# Patient Record
Sex: FEMALE | Race: WHITE | NOT HISPANIC OR LATINO | Employment: OTHER | URBAN - METROPOLITAN AREA
[De-identification: names, ages, dates, MRNs, and addresses within clinical notes are randomized per-mention and may not be internally consistent; named-entity substitution may affect disease eponyms.]

---

## 2020-01-22 NOTE — PROGRESS NOTES
ASSESSMENT & PLAN: Chris Hills is a 37 y o    with normal gynecologic exam     1   Routine well woman exam done today  2  Pap and HPV:  The patient's last pap and hpv was 2018 per pt  It was normal     Pap and cotesting was done today  Will call with results  Current AS[facility]P Guidelines reviewed  3   The following were reviewed in today's visit: breast self exam, STD testing, adequate intake of calcium and vitamin D, exercise and healthy diet  4  GC/Chlamydia, RPR, HIV, Hep B ordered for STD screening, will call with results  5  Amenorrhea in the setting of history of PCOS- TSH, Prolactin, and FSH ordered, will call with results  6  Encouraged to bring OTC supplements to next visit  7  F/U in 2 weeks to review lab results on a Tues morning when  Dr Weiss Serum here  BMI Counseling: Body mass index is 41 47 kg/m²  The BMI is above normal  Nutrition recommendations include reducing portion sizes, consuming healthier snacks and moderation in carbohydrate intake  CC:  Annual Gynecologic Examination    HPI: Chris Hills is a 37 y o  New patient to    who presents for annual gynecologic examination  She has the following concerns:  Amenorrhea  Pt reports LMP in 2018- exact date unknown  Pt had second child in  10/2018 and breast-fed for a year which ended in 10/2019  Pt attributed amenorrhea during that year due to breastfeeding  Currently, she is concerned because she has not had her menses since she stopped breastfeeding  Prior to getting pregnant in 2018 pt reports taking 2  OTC supplements (FertilAid and Ova Boost) that stimulated her menses on a regular basis  While on the medication her cycle was every 28 days and lasted 5 days a week  Moderate flow for the first 2 days and used about 3 pads a day and light flow for the remaining 3 days  Pt reports without these medications pt would only get her menses twice a year   She attributes her last successful pregnancy to these medications  Pt reports she has hx of PCOS    Health Maintenance:    She wears her seatbelt routinely  She does not perform regular monthly self breast exams  She feels safe at home  No past medical history on file  No past surgical history on file  Past OB/Gyn History:  OB History    None       Pt has menstrual issues  Amenorrhea as noted in HPI   History of sexually transmitted infection: Yes  + GC in 2015 per pt  History of abnormal pap smears: No     Patient is currently sexually active  heterosexual and  monogamous 17 years  The current method of family planning is none  No family history on file      Social History:  Social History     Socioeconomic History    Marital status: /Civil Union     Spouse name: Not on file    Number of children: Not on file    Years of education: Not on file    Highest education level: Not on file   Occupational History    Not on file   Social Needs    Financial resource strain: Not on file    Food insecurity:     Worry: Not on file     Inability: Not on file    Transportation needs:     Medical: Not on file     Non-medical: Not on file   Tobacco Use    Smoking status: Not on file   Substance and Sexual Activity    Alcohol use: Not on file    Drug use: Not on file    Sexual activity: Not on file   Lifestyle    Physical activity:     Days per week: Not on file     Minutes per session: Not on file    Stress: Not on file   Relationships    Social connections:     Talks on phone: Not on file     Gets together: Not on file     Attends Mosque service: Not on file     Active member of club or organization: Not on file     Attends meetings of clubs or organizations: Not on file     Relationship status: Not on file    Intimate partner violence:     Fear of current or ex partner: Not on file     Emotionally abused: Not on file     Physically abused: Not on file     Forced sexual activity: Not on file   Other Topics Concern    Not on file Social History Narrative    Not on file     Presently lives with  and two sons  Patient is   Patient is currently employed, self employed    Allergies not on file    No current outpatient medications on file  Review of Systems  Constitutional :no fever, feels well, no tiredness, no recent weight gain or loss  ENT: no ear ache, no loss of hearing, no nosebleeds or nasal discharge, no sore throat or hoarseness  Cardiovascular: no complaints of slow or fast heart beat, no chest pain, no palpitations, no leg claudication or lower extremity edema  Respiratory: no complaints of shortness of shortness of breath, no MERINO  Breasts:no complaints of breast pain, breast lump, or nipple discharge  Gastrointestinal: no complaints of abdominal pain, constipation, nausea, vomiting, or diarrhea or bloody stools  Genitourinary : no complaints of dysuria, incontinence, pelvic pain, vaginal discharge or abnormal vaginal bleeding  + Amenorrhea as noted in HPI  Musculoskeletal: no complaints of arthralgia, no myalgia, no joint swelling or stiffness, no limb pain or swelling  Integumentary: no complaints of skin rash or lesion, itching or dry skin  Neurological: no complaints of headache, no confusion, no numbness or tingling, no dizziness or fainting    Objective      There were no vitals taken for this visit    General:   appears stated age, cooperative, alert normal mood and affect   Neck: normal, supple,trachea midline, no masses   Heart: regular rate and rhythm, S1, S2 normal, no murmur, click, rub or gallop   Lungs: clear to auscultation bilaterally   Breasts: normal appearance, no masses or tenderness, No nipple retraction or dimpling, No nipple discharge or bleeding, No axillary or supraclavicular adenopathy, Taught monthly breast self examination   Abdomen: soft, non-tender, without masses or organomegaly   Vulva: Bartholin's, Urethra, Bergoo normal   Vagina: normal vagina, no discharge, exudate, lesion, or erythema   Urethra: normal   Cervix: PAP done  Friable  GCC done  Uterus: anteverted and small   Adnexa: no mass, fullness, tenderness   Lymphatic palpation of lymph nodes in neck, axilla, groin and/or other locations: no lymphadenopathy or masses noted   Skin normal skin turgor and no rashes     Psychiatric orientation to person, place, and time: normal  mood and affect: normal     Seen by Romana Regulus, SNP  And Haily CONCEPCION

## 2020-01-23 ENCOUNTER — OFFICE VISIT (OUTPATIENT)
Dept: OBGYN CLINIC | Facility: CLINIC | Age: 35
End: 2020-01-23

## 2020-01-23 VITALS
WEIGHT: 255 LBS | DIASTOLIC BLOOD PRESSURE: 75 MMHG | SYSTOLIC BLOOD PRESSURE: 107 MMHG | HEART RATE: 81 BPM | BODY MASS INDEX: 40.98 KG/M2 | HEIGHT: 66 IN

## 2020-01-23 DIAGNOSIS — N91.2 AMENORRHEA: ICD-10-CM

## 2020-01-23 DIAGNOSIS — Z11.3 ROUTINE SCREENING FOR STI (SEXUALLY TRANSMITTED INFECTION): ICD-10-CM

## 2020-01-23 DIAGNOSIS — Z87.42 HISTORY OF PCOS: ICD-10-CM

## 2020-01-23 DIAGNOSIS — Z23 NEED FOR INFLUENZA VACCINATION: ICD-10-CM

## 2020-01-23 DIAGNOSIS — Z01.419 ENCOUNTER FOR GYNECOLOGICAL EXAMINATION WITHOUT ABNORMAL FINDING: Primary | ICD-10-CM

## 2020-01-23 PROCEDURE — 88175 CYTOPATH C/V AUTO FLUID REDO: CPT | Performed by: NURSE PRACTITIONER

## 2020-01-23 PROCEDURE — 87624 HPV HI-RISK TYP POOLED RSLT: CPT | Performed by: NURSE PRACTITIONER

## 2020-01-23 PROCEDURE — 90686 IIV4 VACC NO PRSV 0.5 ML IM: CPT | Performed by: NURSE PRACTITIONER

## 2020-01-23 PROCEDURE — 87491 CHLMYD TRACH DNA AMP PROBE: CPT | Performed by: NURSE PRACTITIONER

## 2020-01-23 PROCEDURE — 99385 PREV VISIT NEW AGE 18-39: CPT | Performed by: NURSE PRACTITIONER

## 2020-01-23 PROCEDURE — 87591 N.GONORRHOEAE DNA AMP PROB: CPT | Performed by: NURSE PRACTITIONER

## 2020-01-23 PROCEDURE — 3725F SCREEN DEPRESSION PERFORMED: CPT | Performed by: NURSE PRACTITIONER

## 2020-01-23 PROCEDURE — 90471 IMMUNIZATION ADMIN: CPT | Performed by: NURSE PRACTITIONER

## 2020-01-23 NOTE — PATIENT INSTRUCTIONS
Polycystic Ovarian Syndrome   WHAT YOU NEED TO KNOW:   Polycystic ovarian syndrome (PCOS) is a hormone disorder that causes cysts to form on your ovaries  Cysts are bumps that are filled with fluid  The cysts can prevent your ovaries from working correctly  DISCHARGE INSTRUCTIONS:   Medicines:   · Birth control pills: These medicines have female hormones, and may decrease male hormone levels  Birth control pills may control your periods, prevent cysts, or cause them to shrink  They also help decrease your risk of endometrial cancer and correct abnormal bleeding  · Hypoglycemic medicines: These help to lower your blood sugar levels and decrease insulin resistance  They are also used to lower male hormone levels and help you ovulate  · NSAIDs:  These medicines decrease swelling and pain  You can buy NSAIDs without a doctor's order  Ask your healthcare provider which medicine is right for you, and how much to take  Take as directed  NSAIDs can cause stomach bleeding or kidney problems if not taken correctly  · Take your medicine as directed  Contact your healthcare provider if you think your medicine is not helping or if you have side effects  Tell him of her if you are allergic to any medicine  Keep a list of the medicines, vitamins, and herbs you take  Include the amounts, and when and why you take them  Bring the list or the pill bottles to follow-up visits  Carry your medicine list with you in case of an emergency  Follow up with your healthcare provider or gynecologist as directed: You may need to return to have more tests  Write down your questions so you remember to ask them during your visits  Manage your blood sugar and blood pressure: Your healthcare provider may want you to check your blood sugar levels and blood pressure at home  Keep a record and bring this to your follow-up visits  Blood sugar is measured with a glucose monitor  The monitor tests a small drop of blood   Blood pressure is measured with a cuff that you put on your arm and tighten  Ask for more information on how to measure your blood sugar and blood pressure  Manage your symptoms:   · Maintain a healthy weight:  Ask your healthcare provider how much you should weigh  Ask him to help you create a weight loss plan if you are overweight  Weight loss may help reduce the complications of PCOS  · Exercise:  Ask your healthcare provider about the best exercise plan for you  Exercise can help decrease blood sugar and blood pressure  It may also help with weight loss  · Eat a variety of healthy foods:  Healthy foods include fruits, vegetables, whole-grain breads, low-fat dairy products, beans, lean meats, and fish  A dietitian may help you plan meals that are lower in carbohydrates to help you manage your blood sugar levels  Too much carbohydrate at one time can raise your blood sugar to a high level  Contact your healthcare provider or gynecologist if:   · You have a fever  · You feel weak or tired  · You have pain during sex  · Your pain is worse or does not go away after you take your pain medicine  · You have trouble urinating or emptying your bladder completely  · You have questions or concerns about your condition or care  Return to the emergency department if:   · You have a severe headache or feel dizzy  · You vomit multiple times and cannot keep food or liquids down  · You have blurred or double vision  · Your breath has a fruity sweet smell, or you feel short of breath  · You have severe lower abdominal or pelvic pain  © 2017 [employer] Information is for End User's use only and may not be sold, redistributed or otherwise used for commercial purposes  All illustrations and images included in CareNotes® are the copyrighted property of [employer]  or [employer]  The above information is an  only   It is not intended as medical advice for individual conditions or treatments  Talk to your doctor, nurse or pharmacist before following any medical regimen to see if it is safe and effective for you

## 2020-01-24 LAB
C TRACH DNA SPEC QL NAA+PROBE: NEGATIVE
N GONORRHOEA DNA SPEC QL NAA+PROBE: NEGATIVE

## 2020-01-27 ENCOUNTER — TELEPHONE (OUTPATIENT)
Dept: OBGYN CLINIC | Facility: CLINIC | Age: 35
End: 2020-01-27

## 2020-01-27 LAB
HPV HR 12 DNA CVX QL NAA+PROBE: NEGATIVE
HPV16 DNA CVX QL NAA+PROBE: NEGATIVE
HPV18 DNA CVX QL NAA+PROBE: NEGATIVE

## 2020-01-27 NOTE — TELEPHONE ENCOUNTER
----- Message from David Ponce sent at 1/27/2020  8:10 AM EST -----  Result is negative, please call patient to inform

## 2020-01-28 LAB
LAB AP GYN PRIMARY INTERPRETATION: NORMAL
Lab: NORMAL

## 2020-02-03 ENCOUNTER — TELEPHONE (OUTPATIENT)
Dept: OBGYN CLINIC | Facility: CLINIC | Age: 35
End: 2020-02-03

## 2020-02-03 NOTE — TELEPHONE ENCOUNTER
Called to re-schedule patients appointment that is scheduled for tomorrow 2/4/20  Blood work was not completed/or not on file  Left message to call office

## 2020-02-04 ENCOUNTER — TRANSCRIBE ORDERS (OUTPATIENT)
Dept: LAB | Facility: CLINIC | Age: 35
End: 2020-02-04

## 2020-02-04 ENCOUNTER — APPOINTMENT (OUTPATIENT)
Dept: LAB | Facility: CLINIC | Age: 35
End: 2020-02-04
Payer: COMMERCIAL

## 2020-02-04 DIAGNOSIS — N91.2 AMENORRHEA: ICD-10-CM

## 2020-02-04 LAB
FSH SERPL-ACNC: 6.3 MIU/ML
HBV SURFACE AG SER QL: NORMAL
PROLACTIN SERPL-MCNC: 3.9 NG/ML
TSH SERPL DL<=0.05 MIU/L-ACNC: 2 UIU/ML (ref 0.36–3.74)

## 2020-02-04 PROCEDURE — 86592 SYPHILIS TEST NON-TREP QUAL: CPT | Performed by: NURSE PRACTITIONER

## 2020-02-04 PROCEDURE — 87389 HIV-1 AG W/HIV-1&-2 AB AG IA: CPT | Performed by: NURSE PRACTITIONER

## 2020-02-04 PROCEDURE — 36415 COLL VENOUS BLD VENIPUNCTURE: CPT | Performed by: NURSE PRACTITIONER

## 2020-02-04 PROCEDURE — 84146 ASSAY OF PROLACTIN: CPT

## 2020-02-04 PROCEDURE — 84443 ASSAY THYROID STIM HORMONE: CPT

## 2020-02-04 PROCEDURE — 83001 ASSAY OF GONADOTROPIN (FSH): CPT

## 2020-02-04 PROCEDURE — 87340 HEPATITIS B SURFACE AG IA: CPT | Performed by: NURSE PRACTITIONER

## 2020-02-05 LAB
HIV 1+2 AB+HIV1 P24 AG SERPL QL IA: NORMAL
RPR SER QL: NORMAL

## 2020-02-11 ENCOUNTER — OFFICE VISIT (OUTPATIENT)
Dept: OBGYN CLINIC | Facility: CLINIC | Age: 35
End: 2020-02-11

## 2020-02-11 VITALS
WEIGHT: 239 LBS | SYSTOLIC BLOOD PRESSURE: 105 MMHG | BODY MASS INDEX: 38.87 KG/M2 | DIASTOLIC BLOOD PRESSURE: 73 MMHG | HEART RATE: 73 BPM

## 2020-02-11 DIAGNOSIS — E28.2 PCOS (POLYCYSTIC OVARIAN SYNDROME): Primary | ICD-10-CM

## 2020-02-11 PROCEDURE — 99214 OFFICE O/P EST MOD 30 MIN: CPT | Performed by: FAMILY MEDICINE

## 2020-02-11 PROCEDURE — T1015 CLINIC SERVICE: HCPCS | Performed by: FAMILY MEDICINE

## 2020-02-11 RX ORDER — NORGESTIMATE AND ETHINYL ESTRADIOL 0.25-0.035
1 KIT ORAL DAILY
Qty: 30 TABLET | Refills: 4 | Status: SHIPPED | OUTPATIENT
Start: 2020-02-11 | End: 2020-07-30 | Stop reason: ALTCHOICE

## 2020-02-17 ENCOUNTER — HOSPITAL ENCOUNTER (EMERGENCY)
Facility: HOSPITAL | Age: 35
Discharge: HOME | End: 2020-02-18
Attending: EMERGENCY MEDICINE | Admitting: EMERGENCY MEDICINE
Payer: COMMERCIAL

## 2020-02-17 VITALS
HEART RATE: 82 BPM | BODY MASS INDEX: 38.25 KG/M2 | OXYGEN SATURATION: 96 % | WEIGHT: 238 LBS | SYSTOLIC BLOOD PRESSURE: 113 MMHG | TEMPERATURE: 98.2 F | HEIGHT: 66 IN | DIASTOLIC BLOOD PRESSURE: 75 MMHG | RESPIRATION RATE: 18 BRPM

## 2020-02-17 DIAGNOSIS — J02.9 VIRAL PHARYNGITIS: Primary | ICD-10-CM

## 2020-02-17 PROCEDURE — 87651 STREP A DNA AMP PROBE: CPT | Performed by: EMERGENCY MEDICINE

## 2020-02-17 PROCEDURE — 99283 EMERGENCY DEPT VISIT LOW MDM: CPT

## 2020-02-17 PROCEDURE — 99283 EMERGENCY DEPT VISIT LOW MDM: CPT | Performed by: EMERGENCY MEDICINE

## 2020-02-18 LAB — S PYO DNA THROAT QL NAA+PROBE: NORMAL

## 2020-02-18 NOTE — ED PROVIDER NOTES
History  Chief Complaint   Patient presents with    Sore Throat     Pt reports a sore throat and ear pain that started yesturday  Pt sister had strep throat   Earache        used: No    Sore Throat   Location:  Generalized  Quality:  Aching  Severity:  Moderate  Onset quality:  Gradual  Duration:  2 days  Timing:  Intermittent  Progression:  Waxing and waning  Chronicity:  New  Relieved by:  None tried  Worsened by:  Nothing  Ineffective treatments:  None tried  Associated symptoms: ear pain    Associated symptoms: no abdominal pain, no adenopathy, no chest pain, no chills, no ear discharge, no epistaxis, no eye discharge, no fever, no neck stiffness, no rash, no rhinorrhea, no shortness of breath, no trouble swallowing and no voice change    Risk factors: exposure to strep        Prior to Admission Medications   Prescriptions Last Dose Informant Patient Reported? Taking?   norgestimate-ethinyl estradiol (ORTHO-CYCLEN) 0 25-35 MG-MCG per tablet   No No   Sig: Take 1 tablet by mouth daily      Facility-Administered Medications: None       History reviewed  No pertinent past medical history  Past Surgical History:   Procedure Laterality Date    VAGINAL DELIVERY      WISDOM TOOTH EXTRACTION         Family History   Problem Relation Age of Onset    No Known Problems Mother     Hypertension Father     Hyperlipidemia Father      I have reviewed and agree with the history as documented  Social History     Tobacco Use    Smoking status: Former Smoker    Smokeless tobacco: Never Used   Substance Use Topics    Alcohol use: Not Currently     Frequency: Monthly or less     Drinks per session: 1 or 2     Binge frequency: Never    Drug use: Never       Review of Systems   Constitutional: Negative for activity change, appetite change, chills, diaphoresis, fatigue and fever  HENT: Positive for ear pain and sore throat   Negative for congestion, dental problem, ear discharge, facial swelling, nosebleeds, rhinorrhea, sinus pressure, trouble swallowing and voice change  Eyes: Negative for photophobia, discharge, itching and visual disturbance  Respiratory: Negative for choking, chest tightness and shortness of breath  Cardiovascular: Negative for chest pain, palpitations and leg swelling  Gastrointestinal: Negative for abdominal distention, abdominal pain, constipation, diarrhea, nausea and vomiting  Endocrine: Negative for polydipsia and polyphagia  Genitourinary: Negative for decreased urine volume, difficulty urinating, dysuria, flank pain, frequency, hematuria, vaginal bleeding and vaginal discharge  Musculoskeletal: Negative for back pain, gait problem, joint swelling, neck pain and neck stiffness  Skin: Negative for color change and rash  Neurological: Negative for dizziness, facial asymmetry, speech difficulty, weakness and light-headedness  Hematological: Negative for adenopathy  Psychiatric/Behavioral: Negative for agitation and behavioral problems  The patient is not nervous/anxious and is not hyperactive  All other systems reviewed and are negative  Physical Exam  Physical Exam   Constitutional: She is oriented to person, place, and time  She appears well-developed and well-nourished  No distress  HENT:   Head: Normocephalic and atraumatic  Right Ear: Tympanic membrane normal    Left Ear: Tympanic membrane normal    Eyes: Pupils are equal, round, and reactive to light  EOM are normal    Neck: Normal range of motion  Neck supple  Cardiovascular: Normal rate, regular rhythm and normal heart sounds  No murmur heard  Pulmonary/Chest: Effort normal and breath sounds normal  No respiratory distress  She has no wheezes  She has no rales  Abdominal: Soft  Bowel sounds are normal  She exhibits no distension  There is no tenderness  There is no rebound and no guarding  Musculoskeletal: Normal range of motion  She exhibits no edema or deformity  Lymphadenopathy:     She has no cervical adenopathy  Neurological: She is alert and oriented to person, place, and time  No cranial nerve deficit  She exhibits normal muscle tone  Coordination normal    Skin: Capillary refill takes less than 2 seconds  No rash noted  No erythema  Psychiatric: She has a normal mood and affect  Her behavior is normal    Nursing note and vitals reviewed  Vital Signs  ED Triage Vitals [02/17/20 2236]   Temperature Pulse Respirations Blood Pressure SpO2   98 2 °F (36 8 °C) 82 18 113/75 96 %      Temp Source Heart Rate Source Patient Position - Orthostatic VS BP Location FiO2 (%)   Oral Monitor Sitting Left arm --      Pain Score       6           Vitals:    02/17/20 2236   BP: 113/75   Pulse: 82   Patient Position - Orthostatic VS: Sitting         Visual Acuity      ED Medications  Medications - No data to display    Diagnostic Studies  Results Reviewed     Procedure Component Value Units Date/Time    Strep A PCR [[id number]]  (Normal) Collected:  02/17/20 2303    Lab Status:  Final result Specimen:  Throat Updated:  02/18/20 0010     STREP A PCR None Detected                 No orders to display              Procedures  Procedures         ED Course                               MDM  Number of Diagnoses or Management Options  Viral pharyngitis: new and requires workup  Diagnosis management comments: Sore throat for 2 days  Strep negative  No peritonsillar abscess  Overall appears well, tolerating secretions  No additional laboratory studies indicated  Recommended Tylenol/Motrin  Suspect viral pharyngitis  Stable at time of discharge         Amount and/or Complexity of Data Reviewed  Clinical lab tests: ordered and reviewed    Risk of Complications, Morbidity, and/or Mortality  Presenting problems: moderate  Diagnostic procedures: moderate  Management options: moderate    Patient Progress  Patient progress: stable        Disposition  Final diagnoses:   Viral pharyngitis Time reflects when diagnosis was documented in both MDM as applicable and the Disposition within this note     Time User Action Codes Description Comment    2/18/2020 12:11 AM Jocelynn Paradis Add [J02 9] Viral pharyngitis       ED Disposition     ED Disposition Condition Date/Time Comment    Discharge Stable Tue Feb 18, 2020 12:11 AM Berta Gomez discharge to home/self care  Follow-up Information     Follow up With Specialties Details Why Contact Info    Kellen Manzanares MD Family Medicine Schedule an appointment as soon as possible for a visit in 3 days As needed [phone]  [phone] [address] [phone]            Patient's Medications   Discharge Prescriptions    No medications on file     No discharge procedures on file      PDMP Review     None          ED Provider  Electronically Signed by           Prince Jones MD  02/18/20 6326

## 2020-03-02 ENCOUNTER — HOSPITAL ENCOUNTER (EMERGENCY)
Facility: HOSPITAL | Age: 35
Discharge: HOME | End: 2020-03-02
Attending: EMERGENCY MEDICINE | Admitting: EMERGENCY MEDICINE
Payer: COMMERCIAL

## 2020-03-02 VITALS
BODY MASS INDEX: 38.32 KG/M2 | SYSTOLIC BLOOD PRESSURE: 117 MMHG | OXYGEN SATURATION: 100 % | TEMPERATURE: 97.3 F | DIASTOLIC BLOOD PRESSURE: 75 MMHG | RESPIRATION RATE: 18 BRPM | WEIGHT: 237.44 LBS | HEART RATE: 75 BPM

## 2020-03-02 DIAGNOSIS — J02.9 ACUTE PHARYNGITIS, UNSPECIFIED ETIOLOGY: Primary | ICD-10-CM

## 2020-03-02 PROCEDURE — 99282 EMERGENCY DEPT VISIT SF MDM: CPT

## 2020-03-02 PROCEDURE — 99284 EMERGENCY DEPT VISIT MOD MDM: CPT | Performed by: EMERGENCY MEDICINE

## 2020-03-02 RX ORDER — PENICILLIN V POTASSIUM 250 MG/1
500 TABLET ORAL ONCE
Status: COMPLETED | OUTPATIENT
Start: 2020-03-02 | End: 2020-03-02

## 2020-03-02 RX ORDER — PENICILLIN V POTASSIUM 500 MG/1
500 TABLET ORAL 2 TIMES DAILY
Qty: 14 TABLET | Refills: 0 | Status: SHIPPED | OUTPATIENT
Start: 2020-03-02 | End: 2020-03-09

## 2020-03-02 RX ORDER — IBUPROFEN 600 MG/1
600 TABLET ORAL ONCE
Status: COMPLETED | OUTPATIENT
Start: 2020-03-02 | End: 2020-03-02

## 2020-03-02 RX ADMIN — IBUPROFEN 600 MG: 600 TABLET ORAL at 01:20

## 2020-03-02 RX ADMIN — PENICILLIN V POTASSIUM 500 MG: 250 TABLET, FILM COATED ORAL at 01:20

## 2020-03-02 NOTE — ED PROVIDER NOTES
History  Chief Complaint   Patient presents with    Sore Throat     Patient comes to ED for throat pain and difficulty swallowing  Pt states it has been going on for 1 month  Went to PCP and was given 3 day course of abx and completed it last week  Started with symptoms again Saturday  Patient is a 64-year-old female with a history of PCOS who presents with a sore throat  Patient states that she started with a sore throat more than 1 week ago and was seen in the emergency department  She tested negative for strep and followed up with her PCP  Her PCP put her on 3 days of an oral antibiotic  She states that she had complete resolution of symptoms but that they returned 2 days ago  She describes a sore throat and significant pain with swallowing  She has been taking ibuprofen with some relief of symptoms  She denies fever, chills, difficulty breathing, cough or other concerns  History provided by:  Patient  Sore Throat   Location:  Generalized  Quality:  Sore  Duration:  2 days  Timing:  Constant  Progression:  Worsening  Chronicity:  New  Relieved by:  NSAIDs  Associated symptoms: ear pain and voice change    Associated symptoms: no abdominal pain, no adenopathy, no chest pain, no chills, no cough, no epistaxis, no fever, no headaches, no neck stiffness, no rash, no shortness of breath and no trouble swallowing        Prior to Admission Medications   Prescriptions Last Dose Informant Patient Reported? Taking?   norgestimate-ethinyl estradiol (ORTHO-CYCLEN) 0 25-35 MG-MCG per tablet Not Taking at Unknown time  No No   Sig: Take 1 tablet by mouth daily   Patient not taking: Reported on 3/2/2020      Facility-Administered Medications: None       History reviewed  No pertinent past medical history      Past Surgical History:   Procedure Laterality Date    VAGINAL DELIVERY      WISDOM TOOTH EXTRACTION         Family History   Problem Relation Age of Onset    No Known Problems Mother     Hypertension Father     Hyperlipidemia Father      I have reviewed and agree with the history as documented  E-Cigarette/Vaping    E-Cigarette Use Never User      E-Cigarette/Vaping Substances    Nicotine No     THC No     CBD No      Social History     Tobacco Use    Smoking status: Former Smoker    Smokeless tobacco: Never Used   Substance Use Topics    Alcohol use: Not Currently     Frequency: Monthly or less     Drinks per session: 1 or 2     Binge frequency: Never    Drug use: Never       Review of Systems   Constitutional: Negative for chills, diaphoresis and fever  HENT: Positive for ear pain, sore throat and voice change  Negative for nosebleeds and trouble swallowing  Eyes: Negative for photophobia, pain and visual disturbance  Respiratory: Negative for cough, chest tightness and shortness of breath  Cardiovascular: Negative for chest pain, palpitations and leg swelling  Gastrointestinal: Negative for abdominal pain, constipation, diarrhea, nausea and vomiting  Endocrine: Negative for polydipsia and polyuria  Genitourinary: Negative for difficulty urinating, dysuria, hematuria, pelvic pain, vaginal bleeding and vaginal discharge  Musculoskeletal: Negative for back pain, neck pain and neck stiffness  Skin: Negative for pallor and rash  Neurological: Negative for dizziness, seizures, light-headedness and headaches  Hematological: Negative for adenopathy  All other systems reviewed and are negative  Physical Exam  Physical Exam   Constitutional: She is oriented to person, place, and time  She appears well-developed and well-nourished  No distress  HENT:   Head: Normocephalic and atraumatic  Mouth/Throat: Mucous membranes are normal  Oropharyngeal exudate (  Left greater than right tonsillar exudate ) and posterior oropharyngeal erythema (generalized erythema) present  Eyes: Pupils are equal, round, and reactive to light  EOM are normal    Neck: Normal range of motion   Neck supple  Cardiovascular: Normal rate, regular rhythm, normal heart sounds, intact distal pulses and normal pulses  Pulmonary/Chest: Effort normal and breath sounds normal  No respiratory distress  Abdominal: Soft  She exhibits no distension  There is no tenderness  There is no rigidity, no rebound and no guarding  Musculoskeletal: Normal range of motion  She exhibits no edema or tenderness  Lymphadenopathy:     She has no cervical adenopathy  Neurological: She is alert and oriented to person, place, and time  She has normal strength  No cranial nerve deficit or sensory deficit  Skin: Skin is warm and dry  Capillary refill takes less than 2 seconds  Psychiatric: She has a normal mood and affect  Nursing note and vitals reviewed  Vital Signs  ED Triage Vitals [03/02/20 0039]   Temperature Pulse Respirations Blood Pressure SpO2   (!) 97 3 °F (36 3 °C) 75 18 117/75 100 %      Temp Source Heart Rate Source Patient Position - Orthostatic VS BP Location FiO2 (%)   Oral Monitor Sitting Right arm --      Pain Score       8           Vitals:    03/02/20 0039   BP: 117/75   Pulse: 75   Patient Position - Orthostatic VS: Sitting         Visual Acuity      ED Medications  Medications   ibuprofen (MOTRIN) tablet 600 mg (600 mg Oral Given 3/2/20 0120)   penicillin V potassium (VEETID) tablet 500 mg (500 mg Oral Given 3/2/20 0120)       Diagnostic Studies  Results Reviewed     None                 No orders to display              Procedures  Procedures         ED Course                               MDM  Number of Diagnoses or Management Options  Acute pharyngitis, unspecified etiology: new and requires workup  Diagnosis management comments: Patient presents with a sore throat  She initially tested negative for strep but followed up with her PCP and was placed on a 3 day course of antibiotics  She did experience relief but her symptoms returned shortly after finishing the antibiotics    Possible recurrence of viral pharyngitis  However it is also possible that she had a bacterial source of infection which was partially treated  Will place on a full course of oral penicillin  No evidence of peritonsillar abscess, retropharyngeal abscess, Willi's angina or other significant pathology  Patient is nontoxic appearing  She is stable for discharge in outpatient follow-up  Advised that she return to the ED if symptoms worsen or persist        Amount and/or Complexity of Data Reviewed  Review and summarize past medical records: yes    Risk of Complications, Morbidity, and/or Mortality  Presenting problems: moderate  Diagnostic procedures: minimal  Management options: moderate    Patient Progress  Patient progress: stable        Disposition  Final diagnoses:   Acute pharyngitis, unspecified etiology     Time reflects when diagnosis was documented in both MDM as applicable and the Disposition within this note     Time User Action Codes Description Comment    3/2/2020 12:59 AM Patricia Broad L Add [J02 0] Strep pharyngitis     3/2/2020 12:59 AM Patricia Broad L Remove [J02 0] Strep pharyngitis     3/2/2020 12:59 AM Patricia Broad L Add [J02 9] Acute pharyngitis, unspecified etiology       ED Disposition     ED Disposition Condition Date/Time Comment    Discharge Stable Mon Mar 2, 2020 12:59 AM Pratima Lat discharge to home/self care              Follow-up Information     Follow up With Specialties Details Why Contact Info    Tali Olson MD Family Medicine Schedule an appointment as soon as possible for a visit  Return to ED sooner if symptoms worsen or persist  [address]  [phone] [address]  [phone]            Discharge Medication List as of 3/2/2020  1:00 AM      START taking these medications    Details   penicillin V potassium (VEETID) 500 mg tablet Take 1 tablet (500 mg total) by mouth 2 (two) times a day for 7 days, Starting Mon 3/2/2020, Until Mon 3/9/2020, Normal CONTINUE these medications which have NOT CHANGED    Details   norgestimate-ethinyl estradiol (ORTHO-CYCLEN) 0 25-35 MG-MCG per tablet Take 1 tablet by mouth daily, Starting Tue 2/11/2020, Normal           No discharge procedures on file      PDMP Review     None          ED Provider  Electronically Signed by           Krishna Bernstein DO  03/02/20 1139

## 2020-06-01 ENCOUNTER — TELEPHONE (OUTPATIENT)
Dept: OBGYN CLINIC | Facility: CLINIC | Age: 35
End: 2020-06-01

## 2020-06-02 ENCOUNTER — OFFICE VISIT (OUTPATIENT)
Dept: OBGYN CLINIC | Facility: CLINIC | Age: 35
End: 2020-06-02

## 2020-06-02 VITALS
BODY MASS INDEX: 37.12 KG/M2 | HEART RATE: 88 BPM | DIASTOLIC BLOOD PRESSURE: 74 MMHG | WEIGHT: 230 LBS | SYSTOLIC BLOOD PRESSURE: 126 MMHG

## 2020-06-02 DIAGNOSIS — N91.2 AMENORRHEA: ICD-10-CM

## 2020-06-02 DIAGNOSIS — E28.2 PCOS (POLYCYSTIC OVARIAN SYNDROME): Primary | ICD-10-CM

## 2020-06-02 DIAGNOSIS — Z20.2 POSSIBLE EXPOSURE TO STD: ICD-10-CM

## 2020-06-02 PROCEDURE — 87491 CHLMYD TRACH DNA AMP PROBE: CPT | Performed by: NURSE PRACTITIONER

## 2020-06-02 PROCEDURE — 87591 N.GONORRHOEAE DNA AMP PROB: CPT | Performed by: NURSE PRACTITIONER

## 2020-06-02 PROCEDURE — 99213 OFFICE O/P EST LOW 20 MIN: CPT | Performed by: NURSE PRACTITIONER

## 2020-06-03 LAB
C TRACH DNA SPEC QL NAA+PROBE: NEGATIVE
N GONORRHOEA DNA SPEC QL NAA+PROBE: NEGATIVE

## 2020-06-04 ENCOUNTER — TELEPHONE (OUTPATIENT)
Dept: OBGYN CLINIC | Facility: CLINIC | Age: 35
End: 2020-06-04

## 2020-06-22 ENCOUNTER — TELEPHONE (OUTPATIENT)
Dept: OBGYN CLINIC | Facility: CLINIC | Age: 35
End: 2020-06-22

## 2020-07-07 ENCOUNTER — TELEPHONE (OUTPATIENT)
Dept: OBGYN CLINIC | Facility: CLINIC | Age: 35
End: 2020-07-07

## 2020-07-07 ENCOUNTER — APPOINTMENT (OUTPATIENT)
Dept: LAB | Facility: CLINIC | Age: 35
End: 2020-07-07
Payer: COMMERCIAL

## 2020-07-07 ENCOUNTER — OFFICE VISIT (OUTPATIENT)
Dept: OBGYN CLINIC | Facility: CLINIC | Age: 35
End: 2020-07-07

## 2020-07-07 VITALS
WEIGHT: 229 LBS | HEART RATE: 70 BPM | BODY MASS INDEX: 36.96 KG/M2 | SYSTOLIC BLOOD PRESSURE: 106 MMHG | DIASTOLIC BLOOD PRESSURE: 76 MMHG

## 2020-07-07 DIAGNOSIS — Z3A.01 LESS THAN 8 WEEKS GESTATION OF PREGNANCY: ICD-10-CM

## 2020-07-07 DIAGNOSIS — Z32.01 POSITIVE URINE PREGNANCY TEST: Primary | ICD-10-CM

## 2020-07-07 DIAGNOSIS — Z20.2 POSSIBLE EXPOSURE TO STD: ICD-10-CM

## 2020-07-07 LAB
B-HCG SERPL-ACNC: 684 MIU/ML
HBV SURFACE AG SER QL: NORMAL

## 2020-07-07 PROCEDURE — 99213 OFFICE O/P EST LOW 20 MIN: CPT | Performed by: OBSTETRICS & GYNECOLOGY

## 2020-07-07 PROCEDURE — 84702 CHORIONIC GONADOTROPIN TEST: CPT

## 2020-07-07 PROCEDURE — 86592 SYPHILIS TEST NON-TREP QUAL: CPT

## 2020-07-07 PROCEDURE — 87340 HEPATITIS B SURFACE AG IA: CPT

## 2020-07-07 PROCEDURE — 87389 HIV-1 AG W/HIV-1&-2 AB AG IA: CPT

## 2020-07-07 PROCEDURE — 36415 COLL VENOUS BLD VENIPUNCTURE: CPT

## 2020-07-07 NOTE — PROGRESS NOTES
Assessment/Plan:     Diagnoses and all orders for this visit:    Positive urine pregnancy test    Less than 8 weeks gestation of pregnancy  -     hCG, quantitative; Future          Patient had STD testing on last visit which revealed Negative GC/Chlamydia  Hep B, HIV, and RPR blood work has yet to be done  Patient will undergo Quantitaive bHCG to have a better estimation of Gestation and follow up Transvaginal U/S timing  Advised to F/U in 4 weeks and if necessary will be called with better timing for Transvaginal U/S and OB intake  Subjective:      Patient ID: Karina Bess is a 38 y o  female  HPI  Patient is a 38 year olf female with a hx of amenorrhea who presents today for Urine pregnancy test  She has a desire to get pregant and was taking OTC supplements in order to "gain her period" and help in contraception  Patient's last period was in 2018 before previous pregnancy  She breast fed her last child until Dec 219 / 3year old  Urine Pregnancy test on 6/2 was Negative    Urine Preganncy Test in office today is Positive  Patient has two other children, a 1year old and 35 year old  This is a desired pregnancy  No Past STD history reported  On previous visit patient desired STD screenign after reporting her  was sleeping with prostitutes  GC/Chlamydia in office was negative but patient has not attained Hep B, RPR, and HIV blood work yet  The following portions of the patient's history were reviewed and updated as appropriate: allergies, current medications, past medical history and problem list     Review of Systems   Constitutional: Negative for fever  HENT: Negative for sore throat  Respiratory: Negative for cough and shortness of breath  Cardiovascular: Negative for chest pain and palpitations  Gastrointestinal: Negative for abdominal pain, constipation and diarrhea  Genitourinary: Negative for dysuria, hematuria and vaginal bleeding     Neurological: Negative for headaches  Objective:      /76 (BP Location: Left arm, Patient Position: Sitting, Cuff Size: Adult)   Pulse 70   Wt 104 kg (229 lb)   BMI 36 96 kg/m²          Physical Exam   Constitutional: She is oriented to person, place, and time  She appears well-developed and well-nourished  No distress  HENT:   Head: Normocephalic and atraumatic  Eyes: Conjunctivae are normal  Right eye exhibits no discharge  Left eye exhibits no discharge  Neck: Normal range of motion  Cardiovascular: Normal rate and regular rhythm  No murmur heard  Pulmonary/Chest: Effort normal and breath sounds normal  No respiratory distress  She has no wheezes  Musculoskeletal: Normal range of motion  She exhibits no edema  Neurological: She is alert and oriented to person, place, and time  Skin: Skin is warm and dry  Capillary refill takes less than 2 seconds  Psychiatric: She has a normal mood and affect

## 2020-07-08 LAB
HIV 1+2 AB+HIV1 P24 AG SERPL QL IA: NORMAL
RPR SER QL: NORMAL

## 2020-07-16 ENCOUNTER — TRANSCRIBE ORDERS (OUTPATIENT)
Dept: LAB | Facility: CLINIC | Age: 35
End: 2020-07-16

## 2020-07-16 ENCOUNTER — APPOINTMENT (OUTPATIENT)
Dept: LAB | Facility: CLINIC | Age: 35
End: 2020-07-16
Payer: COMMERCIAL

## 2020-07-16 DIAGNOSIS — Z00.00 ROUTINE GENERAL MEDICAL EXAMINATION AT A HEALTH CARE FACILITY: ICD-10-CM

## 2020-07-16 DIAGNOSIS — Z00.00 ROUTINE GENERAL MEDICAL EXAMINATION AT A HEALTH CARE FACILITY: Primary | ICD-10-CM

## 2020-07-16 LAB
ALBUMIN SERPL BCP-MCNC: 3.6 G/DL (ref 3.5–5)
ALP SERPL-CCNC: 60 U/L (ref 46–116)
ALT SERPL W P-5'-P-CCNC: 32 U/L (ref 12–78)
ANION GAP SERPL CALCULATED.3IONS-SCNC: 6 MMOL/L (ref 4–13)
AST SERPL W P-5'-P-CCNC: 17 U/L (ref 5–45)
BASOPHILS # BLD AUTO: 0.03 THOUSANDS/ΜL (ref 0–0.1)
BASOPHILS NFR BLD AUTO: 1 % (ref 0–1)
BILIRUB SERPL-MCNC: 0.29 MG/DL (ref 0.2–1)
BUN SERPL-MCNC: 8 MG/DL (ref 5–25)
CALCIUM SERPL-MCNC: 8.4 MG/DL (ref 8.3–10.1)
CHLORIDE SERPL-SCNC: 103 MMOL/L (ref 100–108)
CHOLEST SERPL-MCNC: 212 MG/DL (ref 50–200)
CO2 SERPL-SCNC: 28 MMOL/L (ref 21–32)
CREAT SERPL-MCNC: 0.77 MG/DL (ref 0.6–1.3)
EOSINOPHIL # BLD AUTO: 0.09 THOUSAND/ΜL (ref 0–0.61)
EOSINOPHIL NFR BLD AUTO: 2 % (ref 0–6)
ERYTHROCYTE [DISTWIDTH] IN BLOOD BY AUTOMATED COUNT: 12 % (ref 11.6–15.1)
GFR SERPL CREATININE-BSD FRML MDRD: 99 ML/MIN/1.73SQ M
GLUCOSE P FAST SERPL-MCNC: 91 MG/DL (ref 65–99)
HCT VFR BLD AUTO: 40.8 % (ref 34.8–46.1)
HDLC SERPL-MCNC: 48 MG/DL
HGB BLD-MCNC: 13.2 G/DL (ref 11.5–15.4)
IMM GRANULOCYTES # BLD AUTO: 0.01 THOUSAND/UL (ref 0–0.2)
IMM GRANULOCYTES NFR BLD AUTO: 0 % (ref 0–2)
LDLC SERPL CALC-MCNC: 150 MG/DL (ref 0–100)
LYMPHOCYTES # BLD AUTO: 1.54 THOUSANDS/ΜL (ref 0.6–4.47)
LYMPHOCYTES NFR BLD AUTO: 31 % (ref 14–44)
MCH RBC QN AUTO: 30.1 PG (ref 26.8–34.3)
MCHC RBC AUTO-ENTMCNC: 32.4 G/DL (ref 31.4–37.4)
MCV RBC AUTO: 93 FL (ref 82–98)
MONOCYTES # BLD AUTO: 0.36 THOUSAND/ΜL (ref 0.17–1.22)
MONOCYTES NFR BLD AUTO: 7 % (ref 4–12)
NEUTROPHILS # BLD AUTO: 2.88 THOUSANDS/ΜL (ref 1.85–7.62)
NEUTS SEG NFR BLD AUTO: 59 % (ref 43–75)
NONHDLC SERPL-MCNC: 164 MG/DL
NRBC BLD AUTO-RTO: 0 /100 WBCS
PLATELET # BLD AUTO: 225 THOUSANDS/UL (ref 149–390)
PMV BLD AUTO: 10 FL (ref 8.9–12.7)
POTASSIUM SERPL-SCNC: 3.8 MMOL/L (ref 3.5–5.3)
PROT SERPL-MCNC: 7 G/DL (ref 6.4–8.2)
RBC # BLD AUTO: 4.38 MILLION/UL (ref 3.81–5.12)
SODIUM SERPL-SCNC: 137 MMOL/L (ref 136–145)
TRIGL SERPL-MCNC: 70 MG/DL
WBC # BLD AUTO: 4.91 THOUSAND/UL (ref 4.31–10.16)

## 2020-07-16 PROCEDURE — 80053 COMPREHEN METABOLIC PANEL: CPT

## 2020-07-16 PROCEDURE — 85025 COMPLETE CBC W/AUTO DIFF WBC: CPT

## 2020-07-16 PROCEDURE — 80061 LIPID PANEL: CPT

## 2020-07-16 PROCEDURE — 36415 COLL VENOUS BLD VENIPUNCTURE: CPT

## 2020-07-29 ENCOUNTER — TELEPHONE (OUTPATIENT)
Dept: OBGYN CLINIC | Facility: CLINIC | Age: 35
End: 2020-07-29

## 2020-07-30 ENCOUNTER — ULTRASOUND (OUTPATIENT)
Dept: OBGYN CLINIC | Facility: CLINIC | Age: 35
End: 2020-07-30

## 2020-07-30 VITALS
DIASTOLIC BLOOD PRESSURE: 78 MMHG | SYSTOLIC BLOOD PRESSURE: 120 MMHG | BODY MASS INDEX: 36.9 KG/M2 | RESPIRATION RATE: 16 BRPM | TEMPERATURE: 97.8 F | WEIGHT: 229.6 LBS | HEART RATE: 62 BPM | HEIGHT: 66 IN

## 2020-07-30 DIAGNOSIS — Z3A.08 8 WEEKS GESTATION OF PREGNANCY: Primary | ICD-10-CM

## 2020-07-30 PROCEDURE — 3008F BODY MASS INDEX DOCD: CPT | Performed by: OBSTETRICS & GYNECOLOGY

## 2020-07-30 PROCEDURE — 76801 OB US < 14 WKS SINGLE FETUS: CPT | Performed by: OBSTETRICS & GYNECOLOGY

## 2020-07-30 PROCEDURE — 99213 OFFICE O/P EST LOW 20 MIN: CPT | Performed by: OBSTETRICS & GYNECOLOGY

## 2020-07-30 PROCEDURE — 1036F TOBACCO NON-USER: CPT | Performed by: OBSTETRICS & GYNECOLOGY

## 2020-07-30 NOTE — PROGRESS NOTES
Subjective:     Marilyn Flores is a 38 y o   female who presents with amenorrhea and positive pregnancy test  Patient has unknown LMP as she has not has a menstrual cycle in 2 years  She delivered in 2018 and breast fed for a year  She normally have irregular periods and uses supplements to bring about her cycle, however her period never came and she had a positive pregnancy test     Objective:    Vitals: Blood pressure 120/78, pulse 62, temperature 97 8 °F (36 6 °C), resp  rate 16, height 5' 6" (1 676 m), weight 104 kg (229 lb 9 6 oz)  Body mass index is 37 06 kg/m²  FIRST TRIMESTER OBSTETRIC ULTRASOUND  2020  Sandra Edwards MD     INDICATION: Amenorrhea, viability    COMPARISON: None  TECHNIQUE:   Transvaginal imaging was performed to assess the gestation, myometrial/endometrial architecture and ovarian parenchymal detail  The study includes volumetric sweeps and traditional still imaging technique  FINDINGS:     A single intrauterine gestation is identified  Cardiac activity is detected at 168  YOLK SAC:  Present and normal in size and appearance  MEAN CROWN RUMP LENGTH:  18 2 mm = 8 weeks 2 days   AMNIOTIC FLUID/SAC SHAPE:  Within expected normal range  UTERUS/ADNEXA:   No adnexal mass or pathologic cyst   No free fluid identified  IMPRESSION:     Single intrauterine pregnancy of 8 weeks 2 days gestational age  Fetal cardiac activity detected  No adnexal masses seen  Assessment/Plan:    1  IUP at 8 weeks 2 days gestation  2   RTO for OB intake and Prenatal H&P          Sandra Edwards MD  2020  11:24 AM

## 2020-08-07 ENCOUNTER — TELEPHONE (OUTPATIENT)
Dept: OBGYN CLINIC | Facility: CLINIC | Age: 35
End: 2020-08-07

## 2020-08-10 ENCOUNTER — PATIENT OUTREACH (OUTPATIENT)
Dept: OBGYN CLINIC | Facility: CLINIC | Age: 35
End: 2020-08-10

## 2020-08-10 ENCOUNTER — INITIAL PRENATAL (OUTPATIENT)
Dept: OBGYN CLINIC | Facility: CLINIC | Age: 35
End: 2020-08-10

## 2020-08-10 VITALS
BODY MASS INDEX: 37.16 KG/M2 | TEMPERATURE: 97.8 F | HEART RATE: 76 BPM | WEIGHT: 231.2 LBS | RESPIRATION RATE: 16 BRPM | SYSTOLIC BLOOD PRESSURE: 110 MMHG | HEIGHT: 66 IN | DIASTOLIC BLOOD PRESSURE: 76 MMHG

## 2020-08-10 DIAGNOSIS — Z3A.09 9 WEEKS GESTATION OF PREGNANCY: ICD-10-CM

## 2020-08-10 DIAGNOSIS — O09.521 ELDERLY MULTIGRAVIDA IN FIRST TRIMESTER: Primary | ICD-10-CM

## 2020-08-10 PROCEDURE — T1001 NURSING ASSESSMENT/EVALUATN: HCPCS

## 2020-08-10 NOTE — LETTER
[facility] Letter    Shanti Kinsey  1985  [address] [place] [id number]       08/10/20          Grisel Nettles is a patient and under our care in our office  Michelle Jordan's Estimated Date of Delivery: 03/09/2021  Any questions or concerns feel free to contact our office       Thank you,    [address] [facility],[address]  [id number] [place] [address]/Andrew Chang 15  [facility]/Marie Ramirez [address] [place] [address]/[address]  [phone]

## 2020-08-10 NOTE — PROGRESS NOTES
OB INTAKE INTERVIEW  Pt presents for OB intake  [id number]  OB History    Para Term  AB Living   3 2 2     2   SAB TAB Ectopic Multiple Live Births           2      # Outcome Date GA Lbr Yuri/2nd Weight Sex Delivery Anes PTL Lv   3 Current            2 Term            1 Term              Hx of  delivery prior to 36 weeks 6 days:  No   If yes, place a referral for cervical surveillance at 16 weeks  Last Menstrual Period:    Patient's last menstrual period was 2020 (exact date)  Ultrasound date: 2020  8 weeks 2 days     Estimated Date of Delivery: None noted  by LMP or US  H&P visit scheduled  2020 @ 10am  with Dr Tyra Helm resident     Last pap smear: 2020  Findings; lab pap smear results: no abnormalities    Current Issues:  Constipation :   Yes  Headaches :   No  Cramping:  No  Spotting :   No  PICA cravings :  No  FOB Involved:   Yes  Planned pregnancy:  No  I have these concerns about this prenatal patient:   38year old    Interview education   [facility] P[facility] Ess[facility] reviewed and discussed    Baby and [address] Handout   [facility] M[facility] Hand[facility]   Discussed genetic testing   Prenatal lab work: Scripts printed and given to pt   Influenza vaccine given today: No   Discussed Tdap vaccine  Immunizations:   Immunization History   Administered Date(s) Administered    Influenza, injectable, quadrivalent, preservative free 0 5 mL 2020     Depression Screening Follow-up Plan: Patient's depression screening was negative with an [place] score of  0  Clinically patient does not have depression  No treatment is required     Nurse/Family Partnership- referral placed:  No   If yes, place referral for nurse family partnership  BMI Counseling: Body mass index is 37 32 kg/m²  Tobacco Cessation Counseling: former     Infection Screening: Does the pt have a hx of MRSA?  No  If yes- please follow MRSA protocol and obtain a nasal swab for MRSA culture  The patient was oriented to our practice and all questions were answered    Interviewed by: Bernadette Ballard RN 08/10/20

## 2020-08-10 NOTE — LETTER
Proof of Pregnancy Letter    Javier Franklin  1985  [address] [place] [address] [address] [address]        08/10/20      Grisel Foster is a patient at our facility  Javier Franklin Estimated Date of Delivery: 03/09/2021       Any questions or concerns, please feel free to contact our office      Sincerely,     [facility]   [address],   Fidelina Lara, [address] [place] [address]   [phone]

## 2020-08-10 NOTE — LETTER
Dentist Letter    Kassidy Munoz  1985  [address] [place] [address] [address] [address]          08/10/20    We have had several requests from local dentist requesting permission to perform procedures on our patients who are pregnant  We wish to respond with this letter regarding some of the more routine procedures that we have been asked about  The following procedures may be performed on our obstetric patients:   1  Administration of local anesthesia   2  Administration of antibiotics such as PCN, Ampicillin, and Erythromycin  3  Administration of pain medications such as Tylenol, Tylenol with codeine, and if needed Percocet  4  Shielded X-rays    Should you have any questions, please do not hesitate to contact at [phone]          Sincerely,    [facility]  [phone]

## 2020-08-10 NOTE — LETTER
Work Letter    Em Szymanski  1985  [address] [place] [address] [address] [address]    Dear Em Szymanski,      08/10/20        Your employee is a patient at [facility]  We recommend that all pregnant women:    1  Have a well-ventilated workspace  2  Wear low-heeled shoes  3  Work no more than 40 hours per week  4  Have a 15 minute break every 2 hours and at least 30 minutes for a meal break  5  Use good body mechanics by bending at your knees to avoid back strain and lift no more than 20 pounds without assistance  Will need assistance with lifting over 20 lbs  6  Have ready access to bathrooms and water  She may continue to work until her due date unless medical complications arise  We anticipate she may return to work in 6-8 weeks after delivery       Sincerely,    [facility]

## 2020-08-10 NOTE — PROGRESS NOTES
SW spoke with 37 y/o- M- G3:P2-  English speaking woman for psychosocial assessment  Pt resides with Spouse and 2 boys  Reported reported pregnancy was not intended but welcome  Pt works full time and has MA and WIC  Pt denies any usage of drug, alcohol or smoking  No Mental Health or Domestic Violence issues  Pt claimed her family is very support  Pt denies any transportation problems  Pt denies any question or concern  SW explained her role and gave contact information  Pt was encouraged to contact LEEROY at any time needed

## 2020-08-17 ENCOUNTER — TELEPHONE (OUTPATIENT)
Dept: OBGYN CLINIC | Facility: CLINIC | Age: 35
End: 2020-08-17

## 2020-08-17 ENCOUNTER — TELEMEDICINE (OUTPATIENT)
Dept: PERINATAL CARE | Facility: CLINIC | Age: 35
End: 2020-08-17

## 2020-08-17 DIAGNOSIS — O09.529 ANTEPARTUM MULTIGRAVIDA OF ADVANCED MATERNAL AGE: Primary | ICD-10-CM

## 2020-08-17 DIAGNOSIS — Z31.5 ENCOUNTER FOR PROCREATIVE GENETIC COUNSELING: ICD-10-CM

## 2020-08-17 NOTE — PROGRESS NOTES
Genetic Counseling   High-Risk Gestation Note    Appointment Date:  2020  Referred By: DELL Green*  YOB: 1985  Partner:  Yayo Paul  Indication for Visit:  advanced maternal age  Pregnancy History:   Estimated Date of Delivery: 3/10/21  Estimated Gestational Age: 11w0d    Virtual Regular Visit      Assessment/Plan:    Problem List Items Addressed This Visit     None      Visit Diagnoses     Antepartum multigravida of advanced maternal age    -  Primary    Encounter for procreative genetic counseling                   Reason for visit is   Chief Complaint   Patient presents with    Virtual Regular Visit        Encounter provider Christy Sexton    Provider located at [address] [place] [id number] [phone]      Recent Visits  No visits were found meeting these conditions  Showing recent visits within past 7 days and meeting all other requirements     Future Appointments  No visits were found meeting these conditions  Showing future appointments within next 150 days and meeting all other requirements        The patient was identified by name and date of birth  Travis Bhardwaj was informed that this is a telemedicine visit and that the visit is being conducted through [employer]  My office door was closed  No one else was in the room  She acknowledged consent and understanding of privacy and security of the video platform  The patient has agreed to participate and understands they can discontinue the visit at any time  Patient is aware this is a billable service  Fouzia Chance is a 38 y o  female who presented for genetic counseling to discuss maternal age related risks for aneuploidy  The risk of Down syndrome at age 38 at delivery is 1/217, and the risk for any chromosomal abnormality at this age is 1/123      The risks, benefits, and limitations of amniocentesis were discussed with the patient  Amniocentesis is performed under direct real time ultrasound visualization to avoid both the fetus and the placenta  Once amniotic fluid is withdrawn, laboratory analysis is performed and amniotic fluid alpha-fetoprotein, as well as chromosome analysis is undertaken  The risk of genetic amniocentesis includes, but is not limited to less than 1 in 300 pregnancy loss rate or  delivery rate if 23 weeks or greater, infection, bleeding, rupture of membranes, failure of cells to grow, karyotype error, laboratory error, etc   Occasionally a repeat amniocentesis is necessary due to cell culture failure  Chromosome analysis from amniocentesis is 99 9% accurate and alpha-fetoprotein analysis can detect approximately 95% of open neural tube defects  Chorionic villus sampling (CVS) is another diagnostic testing option that is available earlier than amniocentesis, between 10-14 weeks gestation  Like amniocentesis, CVS is 99% accurate for detecting chromosomal problems  Unlike amniocentesis, CVS cannot detect alpha-fetoprotein levels in order to determine the risk for open neural tube defects  MSAFP testing would need to be performed at 15-20 weeks gestation for this purpose  The risk of CVS includes, but is not limited to, less than a 1 in 300 risk for pregnancy loss  There is also a 1% risk for maternal cell contamination and cell culture failure, in which case the CVS would need to be followed-up with amniocentesis  We reviewed the testing option of cell free fetal DNA screening (also known as noninvasive prenatal testing or NIPT)  We discussed that it is a serum test to identify fragments of fetal DNA in maternal blood  We reviewed the benefits and limitations of cell free fetal DNA screening in detecting Down syndrome, Trisomy 13, Trisomy 25 and sex chromosome aneuploidies    We also discussed that cell free fetal DNA screening does not detect additional chromosomal abnormalities and the possibility of a failed test result  As cell free fetal DNA screening does not detect open neural tube defects, MSAFP screening is available at 15-20 weeks gestation  We discussed the availability of an ultrasound between 11-14 weeks gestation to measure the nuchal translucency (NT), which can assess for chromosome abnormalities, cardiac defects, and other adverse pregnancy outcomes  We reviewed that level II anatomy ultrasound is typically performed at approximately 20 weeks gestation  Level II ultrasound evaluation is between 60-80% accurate in detecting major physical birth defects and variations in fetal development that may be associated with chromosome abnormalities  Level II ultrasound evaluation is not able to detect all birth defects or health problems  After discussing the available prenatal screening and testing options Cynthia Madsen elected to pursue cell free fetal DNA screening  A Squee[employer] lab slip and kit was brought to the [facility] outpatient lab for her blood draw  Results take approximately 7-10 days  The patient declined CVS and amniocentesis secondary to procedural related complications  She may reconsider diagnostic testing should the cell free fetal DNA screening come back abnormal   Cynthia Madsen is also planning on pursuing NT ultrasound, MSAFP screening and Level II ultrasound at the appropriate times  Histories for the patient and her partner's family were taken during our session and was noncontributory  The family history was not significant for genetic diseases or disorders, intellectual disability, birth defects, fetal loss, or consanguinity  Patient reports being of Romanian/English decent and that her  is of Hatian decent  She denies either of them having known Ashkenazi Advent ancestry  The benefits and limitations of Cystic fibrosis (CF), Spinal muscular atrophy (SMA), hemoglobinopathy, Fragile X, and expanded carrier screening was discussed   The patient declined expanded carrier screening, but elected to pursue CF, SMA, hemoglobinopathy and Fragile X carrier screening pending insurance approval   She will be contacted for her blood draw once approval is obtained  Lastly, we discussed the fact that everyone in the general population regardless of age, family history, or medical background has approximately a 3-5% risk of having a child with some type of congenital anomaly, genetic disease or intellectual disability  Currently there are no tests available to rule out all birth defects or health problems  Nika Arianna was provided with our contact information  I encouraged her to call with any questions or concerns  Plan/Tests Ordered:  1) Patient declined CVS, amniocentesis and expanded carrier screening  2) Patient elected NIPT - Homa Kluver and kit brought to [facility] lab on 8/17/20  3) Patient elected CF, SMA, hemoglobinopathy and Fragile X carrier screening pending insurance approval   4) NT ultrasound scheduled for 9/2/20  5) MSAFP screening at 15-20 weeks gestation  6) Level II anatomy ultrasound at approximately 20 weeks gestation  HPI     Past Medical History:   Diagnosis Date    Chlamydia     Varicella        Past Surgical History:   Procedure Laterality Date    VAGINAL DELIVERY      WISDOM TOOTH EXTRACTION         Current Outpatient Medications   Medication Sig Dispense Refill    Prenatal Vit-Fe Fumarate-FA (PRENATAL 1+1 PO) Take by mouth       No current facility-administered medications for this visit  No Known Allergies    Review of Systems    Video Exam    There were no vitals filed for this visit  Physical Exam     I spent 41 minutes with patient today in which greater than 50% of the time was spent in counseling/coordination of care regarding the above  VIRTUAL VISIT DISCLAIMER    Javier Noemi acknowledges that she has consented to an online visit or consultation   She understands that the online visit is based solely on information provided by her, and that, in the absence of a face-to-face physical evaluation by the physician, the diagnosis she receives is both limited and provisional in terms of accuracy and completeness  This is not intended to replace a full medical face-to-face evaluation by the physician  Ruby Mcbride understands and accepts these terms

## 2020-08-18 ENCOUNTER — ROUTINE PRENATAL (OUTPATIENT)
Dept: OBGYN CLINIC | Facility: CLINIC | Age: 35
End: 2020-08-18

## 2020-08-18 ENCOUNTER — TRANSCRIBE ORDERS (OUTPATIENT)
Dept: LAB | Facility: HOSPITAL | Age: 35
End: 2020-08-18

## 2020-08-18 ENCOUNTER — LAB (OUTPATIENT)
Dept: LAB | Facility: HOSPITAL | Age: 35
End: 2020-08-18
Payer: COMMERCIAL

## 2020-08-18 VITALS
HEART RATE: 68 BPM | TEMPERATURE: 98 F | WEIGHT: 235 LBS | SYSTOLIC BLOOD PRESSURE: 108 MMHG | DIASTOLIC BLOOD PRESSURE: 72 MMHG | RESPIRATION RATE: 16 BRPM | BODY MASS INDEX: 37.93 KG/M2

## 2020-08-18 DIAGNOSIS — Z3A.09 9 WEEKS GESTATION OF PREGNANCY: ICD-10-CM

## 2020-08-18 DIAGNOSIS — Z3A.11 11 WEEKS GESTATION OF PREGNANCY: Primary | ICD-10-CM

## 2020-08-18 DIAGNOSIS — O09.529 ELDERLY MULTIGRAVIDA WITH ANTEPARTUM CONDITION OR COMPLICATION: ICD-10-CM

## 2020-08-18 DIAGNOSIS — Z34.91 FIRST TRIMESTER PREGNANCY: ICD-10-CM

## 2020-08-18 DIAGNOSIS — O09.521 ELDERLY MULTIGRAVIDA IN FIRST TRIMESTER: ICD-10-CM

## 2020-08-18 DIAGNOSIS — O09.529 ELDERLY MULTIGRAVIDA WITH ANTEPARTUM CONDITION OR COMPLICATION: Primary | ICD-10-CM

## 2020-08-18 LAB
ABO GROUP BLD: NORMAL
BASOPHILS # BLD AUTO: 0.03 THOUSANDS/ΜL (ref 0–0.1)
BASOPHILS NFR BLD AUTO: 0 % (ref 0–1)
BILIRUB UR QL STRIP: NEGATIVE
BLD GP AB SCN SERPL QL: NEGATIVE
CLARITY UR: CLEAR
COLOR UR: YELLOW
EOSINOPHIL # BLD AUTO: 0.16 THOUSAND/ΜL (ref 0–0.61)
EOSINOPHIL NFR BLD AUTO: 2 % (ref 0–6)
ERYTHROCYTE [DISTWIDTH] IN BLOOD BY AUTOMATED COUNT: 12.1 % (ref 11.6–15.1)
GLUCOSE UR STRIP-MCNC: NEGATIVE MG/DL
HBV SURFACE AG SER QL: NORMAL
HCT VFR BLD AUTO: 36.1 % (ref 34.8–46.1)
HGB BLD-MCNC: 12.5 G/DL (ref 11.5–15.4)
HGB UR QL STRIP.AUTO: NEGATIVE
IMM GRANULOCYTES # BLD AUTO: 0.03 THOUSAND/UL (ref 0–0.2)
IMM GRANULOCYTES NFR BLD AUTO: 0 % (ref 0–2)
KETONES UR STRIP-MCNC: NEGATIVE MG/DL
LEUKOCYTE ESTERASE UR QL STRIP: NEGATIVE
LYMPHOCYTES # BLD AUTO: 1.4 THOUSANDS/ΜL (ref 0.6–4.47)
LYMPHOCYTES NFR BLD AUTO: 19 % (ref 14–44)
MCH RBC QN AUTO: 31.2 PG (ref 26.8–34.3)
MCHC RBC AUTO-ENTMCNC: 34.6 G/DL (ref 31.4–37.4)
MCV RBC AUTO: 90 FL (ref 82–98)
MONOCYTES # BLD AUTO: 0.43 THOUSAND/ΜL (ref 0.17–1.22)
MONOCYTES NFR BLD AUTO: 6 % (ref 4–12)
NEUTROPHILS # BLD AUTO: 5.47 THOUSANDS/ΜL (ref 1.85–7.62)
NEUTS SEG NFR BLD AUTO: 73 % (ref 43–75)
NITRITE UR QL STRIP: NEGATIVE
NRBC BLD AUTO-RTO: 0 /100 WBCS
PH UR STRIP.AUTO: 6 [PH]
PLATELET # BLD AUTO: 239 THOUSANDS/UL (ref 149–390)
PMV BLD AUTO: 10.2 FL (ref 8.9–12.7)
PROT UR STRIP-MCNC: NEGATIVE MG/DL
RBC # BLD AUTO: 4.01 MILLION/UL (ref 3.81–5.12)
RH BLD: POSITIVE
RUBV IGG SERPL IA-ACNC: 58.1 IU/ML
SL AMB  POCT GLUCOSE, UA: NEGATIVE
SL AMB POCT URINE PROTEIN: NEGATIVE
SP GR UR STRIP.AUTO: 1.02 (ref 1–1.03)
SPECIMEN EXPIRATION DATE: NORMAL
UROBILINOGEN UR QL STRIP.AUTO: 0.2 E.U./DL
WBC # BLD AUTO: 7.52 THOUSAND/UL (ref 4.31–10.16)

## 2020-08-18 PROCEDURE — 87086 URINE CULTURE/COLONY COUNT: CPT

## 2020-08-18 PROCEDURE — 81003 URINALYSIS AUTO W/O SCOPE: CPT

## 2020-08-18 PROCEDURE — 80081 OBSTETRIC PANEL INC HIV TSTG: CPT

## 2020-08-18 PROCEDURE — 87077 CULTURE AEROBIC IDENTIFY: CPT

## 2020-08-18 PROCEDURE — 36415 COLL VENOUS BLD VENIPUNCTURE: CPT

## 2020-08-18 PROCEDURE — 87591 N.GONORRHOEAE DNA AMP PROB: CPT | Performed by: STUDENT IN AN ORGANIZED HEALTH CARE EDUCATION/TRAINING PROGRAM

## 2020-08-18 PROCEDURE — 99213 OFFICE O/P EST LOW 20 MIN: CPT | Performed by: OBSTETRICS & GYNECOLOGY

## 2020-08-18 PROCEDURE — 81002 URINALYSIS NONAUTO W/O SCOPE: CPT | Performed by: OBSTETRICS & GYNECOLOGY

## 2020-08-18 PROCEDURE — 87491 CHLMYD TRACH DNA AMP PROBE: CPT | Performed by: STUDENT IN AN ORGANIZED HEALTH CARE EDUCATION/TRAINING PROGRAM

## 2020-08-18 NOTE — PROGRESS NOTES
Assessment     38year old female  10w0d GA female comes in for OB prenatal visit  Plan     Prenatal panel pending  GC culture done today  F/u in 4 weeks    Subjective    Thamas Hamman is a 38 y o  female who presents for annual exam and OB prenatal  Periods are irregular, lasting 5 days  Dysmenorrhea:mild, occurring first 1-2 days of flow  Cyclic symptoms include bloating, breast tenderness, irritability and moodiness  No intermenstrual bleeding, spotting, or discharge  The patient reports that there is not domestic violence in her life  Current contraception: Pregnant  History of abnormal Pap smear: yes - 6 years ago, re-tested normal  Family history of uterine or ovarian cancer: no  Family history of breast cancer: yes - fathers side aunt  History of abnormal lipids: no  Menstrual History:  OB History        3    Para   2    Term   2            AB        Living   2       SAB        TAB        Ectopic        Multiple        Live Births   2                Menarche age: 11yo  Patient's last menstrual period was 2020 (exact date)         The following portions of the patient's history were reviewed and updated as appropriate: allergies, current medications, past family history, past medical history, past social history, past surgical history and problem list     Review of Systems  A comprehensive review of systems was negative   Objective      /72 (BP Location: Right arm, Patient Position: Sitting, Cuff Size: Large)   Pulse 68   Temp 98 °F (36 7 °C)   Resp 16   Wt 107 kg (235 lb)   LMP 2020 (Exact Date) Comment: no period since 10/18  BMI 37 93 kg/m²     General:   alert and oriented, in no acute distress, alert, appears stated age and cooperative   Heart: regular rate and rhythm, S1, S2 normal, no murmur, click, rub or gallop   Lungs: clear to auscultation bilaterally   Abdomen: soft, non-tender, without masses or organomegaly   Vulva: normal   Vagina: normal mucosa   Breasts: No masses or lymph nodes palpated

## 2020-08-19 LAB
HIV 1+2 AB+HIV1 P24 AG SERPL QL IA: NORMAL
RPR SER QL: NORMAL

## 2020-08-20 ENCOUNTER — TELEPHONE (OUTPATIENT)
Dept: FAMILY MEDICINE CLINIC | Facility: CLINIC | Age: 35
End: 2020-08-20

## 2020-08-20 DIAGNOSIS — Z20.822 EXPOSURE TO COVID-19 VIRUS: Primary | ICD-10-CM

## 2020-08-20 LAB
BACTERIA UR CULT: ABNORMAL
BACTERIA UR CULT: ABNORMAL
C TRACH DNA SPEC QL NAA+PROBE: NEGATIVE
N GONORRHOEA DNA SPEC QL NAA+PROBE: NEGATIVE

## 2020-08-20 PROCEDURE — NC001 PR NO CHARGE

## 2020-08-20 NOTE — PROGRESS NOTES
COVID-19 Virtual Visit     Assessment/Plan:    Problem List Items Addressed This Visit     None      Visit Diagnoses     Exposure to COVID-19 virus    -  Primary    Relevant Orders    Novel Coronavirus (COVID-19), PCR [facility] - Collected at [employer] or [facility]        This virtual check-in was done via {AMB CORONAVIRUS VISIT ZSSLWK:39703}  Disposition:      {AMB COVID-19 DISPOSITION:62299}    {covid time spent:74739}    Encounter provider [facility]    Provider located at [address] [place] [id number] [phone]    Recent Visits  No visits were found meeting these conditions  Showing recent visits within past 7 days and meeting all other requirements     Today's Visits  Date Type Provider Dept   [facility] visits and meeting all other requirements     Future Appointments  No visits were found meeting these conditions  Showing future appointments within next 150 days and meeting all other requirements        Patient agrees to participate in a virtual check in via telephone or video visit instead of presenting to the office to address urgent/immediate medical needs  Patient is aware this is a billable service  After connecting through {Communication Method:36230}, the patient was identified by name and date of birth  Meg Renteria was informed that this was a telemedicine visit and that the exam was being conducted confidentially over secure lines  {Telemedicine confidentiality :86753} {Telemedicine participants:71995}  Meg Renteria acknowledged consent and understanding of privacy and security of the telemedicine visit  I informed the patient that I have reviewed her record in Epic and presented the opportunity for her to ask any questions regarding the visit today  The patient agreed to participate  Subjective  Grisel Sam is a 38 y o  female who is concerned about COVID-19  She reports {COVID-19 SYMPTOMS:83613:::0}  She has not experienced {COVID-19 SYMPTOMS:76774:::0} She {HAS/HAS NOT:20194} had contact with a person who is under investigation for or who is positive for COVID-19 within the last 14 days  She {HAS/HAS NOT:20194} been hospitalized recently for fever and/or lower respiratory symptoms  Past Medical History:   Diagnosis Date    Chlamydia     Varicella        Past Surgical History:   Procedure Laterality Date    VAGINAL DELIVERY      WISDOM TOOTH EXTRACTION         Current Outpatient Medications   Medication Sig Dispense Refill    Prenatal Vit-Fe Fumarate-FA (PRENATAL 1+1 PO) Take by mouth       No current facility-administered medications for this visit  No Known Allergies    Review of Systems    Video Exam    There were no vitals filed for this visit  Foster Beth appears {GENERAL APPEARANCE:93390}  Physical Exam     VIRTUAL VISIT DISCLAIMER    Emily Bhavesh acknowledges that she has consented to an online visit or consultation  She understands that the online visit is based solely on information provided by her, and that, in the absence of a face-to-face physical evaluation by the physician, the diagnosis she receives is both limited and provisional in terms of accuracy and completeness  This is not intended to replace a full medical face-to-face evaluation by the physician  Emily Ospina understands and accepts these terms

## 2020-08-20 NOTE — TELEPHONE ENCOUNTER
Wants to be tested for covid, she is traveling out of the country    She will go to [address] for the test     Patient ph # [phone]

## 2020-08-25 ENCOUNTER — TELEPHONE (OUTPATIENT)
Dept: PERINATAL CARE | Facility: CLINIC | Age: 35
End: 2020-08-25

## 2020-08-25 NOTE — TELEPHONE ENCOUNTER
Spoke to pt and gave her normal NIPT results  Pt verbalizes understanding  Pt requesting knowing gender of baby,  confirmed, male gender provided  Instructed her on MSAFP with optimal dating and where to go for testing    Lab slip mailed for MSAFP

## 2020-08-31 ENCOUNTER — TELEPHONE (OUTPATIENT)
Dept: PERINATAL CARE | Facility: CLINIC | Age: 35
End: 2020-08-31

## 2020-08-31 NOTE — TELEPHONE ENCOUNTER
Patient called and said she could not do 9/2 appt for nuchal do to   Spoke to [facility] counselor who recommended she just come in for dating

## 2020-09-01 ENCOUNTER — TELEPHONE (OUTPATIENT)
Dept: PERINATAL CARE | Facility: CLINIC | Age: 35
End: 2020-09-01

## 2020-09-01 DIAGNOSIS — Z13.71 TESTING OF FEMALE FOR GENETIC DISEASE CARRIER STATUS: Primary | ICD-10-CM

## 2020-09-01 NOTE — TELEPHONE ENCOUNTER
Received notice from patient's insurance that CF and SMA carrier screening was approved (Auth # [id number])  Fragile X carrier screening was denied as it is not medically necessary  Called patient and confirmed date of birth  Let her know prior Mehdi Huerta was obtained for CF and SMA  Discussed that Fragile X can still be done but she my have out of pocket expense  Patient elected just hemoglobin electrophoresis, CF and SMA carrier screening  Orders will be mailed to her and she can get her blood drawn at any [facility] location  She will be contacted when results are available

## 2020-09-14 PROBLEM — Z34.92 SECOND TRIMESTER PREGNANCY: Status: ACTIVE | Noted: 2020-09-14

## 2020-09-14 NOTE — PROGRESS NOTES
Elena Yang presents today for routine OB visit at 15 w 0d  Blood Pressure: 113/79  ARLYN of 03/09/2021  Md=900 kg (235 lb); Body mass index is 37 93 kg/m² ; TWG=Not found  Fetal Heart Rate: 150; Fundal Height (cm): 15 cm  Urine dip:  No sample  Abdomen: gravid, soft, non-tender  patient to complete hemoglobin electrophoresis, CF and SMA carrier screening,   needs to complete 1 hour GTT    Denies uterine contractions or persistent cramping  Denies vaginal bleeding or leaking of fluid  Reports no perceived fetal movement  Scheduled for next ultrasound  Has dating ultrasound 09/21/2020,  The patient was unable to do sequential screen  AMA-NIPT- negative test  MSAFP- desires and lab slip given  Reviewed common discomforts of pregnancy in second trimester and warning signs  Advised to continue medications and return in 4 weeks  Current Outpatient Medications   Medication Instructions    Prenatal Vit-Fe Fumarate-FA (PRENATAL 1+1 PO) Oral         Pregnancy Problems (from 06/02/20 to present)     No problems associated with this episode

## 2020-09-14 NOTE — PATIENT INSTRUCTIONS
Covid - 19 instructions    If you are having any of the following     Cough   Shortness of breath   Fever  If traveled internationally or to high risk [place] states  Or been in contact with someone that has     Please call:    [phone]  option 7    They will screen you and direct you to the nearest testing location     Should not come to the PCP or OB office without calling that number first        Pregnancy at 15 to 18 Weeks   AMBULATORY CARE:   What changes are happening to your body:  Now that you are in your second trimester, you have more energy  You may also feel hungrier than usual  You may start to experience other symptoms, such as heartburn or dizziness  You may be gaining about ½ to 1 pound a week, and your pregnancy is beginning to show  You may need to start wearing maternity clothes  Seek care immediately if:   · You have pain or cramping in your abdomen or low back  · You have heavy vaginal bleeding or clotting  · You pass material that looks like tissue or large clots  Collect the material and bring it with you  Contact your healthcare provider if:   · You cannot keep food or drinks down, and you are losing weight  · You have light bleeding  · You have chills or a fever  · You have vaginal itching, burning, or pain  · You have yellow, green, white, or foul-smelling vaginal discharge  · You have pain or burning when you urinate, less urine than usual, or pink or bloody urine  · You have questions or concerns about your condition or care  How to care for yourself at this stage of your pregnancy:   · Manage heartburn  by eating 4 or 5 small meals each day instead of large meals  Avoid spicy foods  Avoid eating right before bedtime  · Manage nausea and vomiting  Avoid fatty and spicy foods  Eat small meals throughout the day instead of large meals  Gerri may help to decrease nausea   Ask your healthcare provider about other ways of decreasing nausea and vomiting  · Eat a variety of healthy foods  Healthy foods include fruits, vegetables, whole-grain breads, low-fat dairy foods, beans, lean meats, and fish  Drink liquids as directed  Ask how much liquid to drink each day and which liquids are best for you  Limit caffeine to less than 200 milligrams each day  Limit your intake of fish to 2 servings each week  Choose fish low in mercury such as canned light tuna, shrimp, salmon, cod, or tilapia  Do not  eat fish high in mercury such as swordfish, tilefish, ed mackerel, and shark  · Take prenatal vitamins as directed  Your need for certain vitamins and minerals, such as folic acid, increases during pregnancy  Prenatal vitamins provide some of the extra vitamins and minerals you need  Prenatal vitamins may also help to decrease the risk of certain birth defects  · Do not smoke  If you smoke, it is never too late to quit  Smoking increases your risk of a miscarriage and other health problems during your pregnancy  Smoking can cause your baby to be born too early or weigh less at birth  Ask your healthcare provider for information if you need help quitting  · Do not drink alcohol  Alcohol passes from your body to your baby through the placenta  It can affect your baby's brain development and cause fetal alcohol syndrome (FAS)  FAS is a group of conditions that causes mental, behavior, and growth problems  · Talk to your healthcare provider before you take any medicines  Many medicines may harm your baby if you take them when you are pregnant  Do not take any medicines, vitamins, herbs, or supplements without first talking to your healthcare provider  Never use illegal or street drugs (such as marijuana or cocaine) while you are pregnant  Safety tips during pregnancy:   · Avoid hot tubs and saunas  Do not use a hot tub or sauna while you are pregnant, especially during your first trimester   Hot tubs and saunas may raise your baby's temperature and increase the risk of birth defects  · Avoid toxoplasmosis  This is an infection caused by eating raw meat or being around infected cat feces  It can cause birth defects, miscarriages, and other problems  Wash your hands after you touch raw meat  Make sure any meat is well-cooked before you eat it  Avoid raw eggs and unpasteurized milk  Use gloves or ask someone else to clean your cat's litter box while you are pregnant  Changes that are happening with your baby:  By 18 weeks, your baby may be about 6 inches long from the top of the head to the rump (baby's bottom)  Your baby may weigh about 11 ounces  You may be able to feel your baby's movement at about 18 weeks or later  The first movements may not be that noticeable  They may feel like a fluttering sensation  Your baby also makes sucking movements and can hear certain sounds  What you need to know about prenatal care:  During the first 28 weeks of your pregnancy, you will see your healthcare provider once a month  Your healthcare provider will check your blood pressure and weight  You may also need any of the following:  · A urine test  may also be done to check for sugar and protein  These can be signs of gestational diabetes or infection  · A blood test  may be done to check for anemia (low iron level)  · Fundal height check  is a measurement of your uterus to check your baby's growth  This number is usually the same as the number of weeks that you have been pregnant  · An ultrasound  may be done to check your baby's development  Your healthcare provider may be able to tell you what your baby's gender is during the ultrasound  · Your baby's heart rate  will be checked  © 2017 [employer] Information is for End User's use only and may not be sold, redistributed or otherwise used for commercial purposes   All illustrations and images included in CareNotes® are the copyrighted property of [employer]  or [employer]  The above information is an  only  It is not intended as medical advice for individual conditions or treatments  Talk to your doctor, nurse or pharmacist before following any medical regimen to see if it is safe and effective for you

## 2020-09-15 ENCOUNTER — ROUTINE PRENATAL (OUTPATIENT)
Dept: OBGYN CLINIC | Facility: CLINIC | Age: 35
End: 2020-09-15

## 2020-09-15 VITALS
SYSTOLIC BLOOD PRESSURE: 113 MMHG | HEART RATE: 77 BPM | TEMPERATURE: 98 F | BODY MASS INDEX: 37.77 KG/M2 | WEIGHT: 235 LBS | DIASTOLIC BLOOD PRESSURE: 79 MMHG | HEIGHT: 66 IN

## 2020-09-15 DIAGNOSIS — Z3A.15 15 WEEKS GESTATION OF PREGNANCY: ICD-10-CM

## 2020-09-15 DIAGNOSIS — E28.2 PCOS (POLYCYSTIC OVARIAN SYNDROME): Primary | ICD-10-CM

## 2020-09-15 DIAGNOSIS — Z34.92 SECOND TRIMESTER PREGNANCY: ICD-10-CM

## 2020-09-15 PROCEDURE — 99213 OFFICE O/P EST LOW 20 MIN: CPT | Performed by: NURSE PRACTITIONER

## 2020-09-18 ENCOUNTER — TELEPHONE (OUTPATIENT)
Dept: PERINATAL CARE | Facility: OTHER | Age: 35
End: 2020-09-18

## 2020-09-18 ENCOUNTER — APPOINTMENT (OUTPATIENT)
Dept: LAB | Facility: CLINIC | Age: 35
End: 2020-09-18
Payer: COMMERCIAL

## 2020-09-18 DIAGNOSIS — Z34.92 SECOND TRIMESTER PREGNANCY: ICD-10-CM

## 2020-09-18 PROCEDURE — 36415 COLL VENOUS BLD VENIPUNCTURE: CPT

## 2020-09-18 PROCEDURE — 82105 ALPHA-FETOPROTEIN SERUM: CPT

## 2020-09-18 NOTE — TELEPHONE ENCOUNTER
Attempted to reach patient by phone and left voicemail to confirm appointment for MFM ultrasound  1 support person ( must be over the age of 15) may accompany you for your appointment  If you or your support person have traveled outside the state in the past 2 weeks, please call and notify our office today #[phone]  You and your support person must wear a mask ,covering nose and mouth,during your entire visit  You and your support person will have temperature screened upon arrival     To minimize your exposure in our waiting room, please call our office prior to entering the building  Check in and rooming questions will be done via phone  We will give you directions when to enter for your appointment  Inside office # provided:  [facility] line: [phone]  [facility] line:  [phone]  [facility] line:  [phone] line:  [phone]  Phillip Shin line:  [phone]  [facility] Lea line:  [phone]    IF you are not feeling well- cough, fever, shortness of breath or any flu like symptoms, contact your primary care physician or [address] Carrie Kwok    Any questions with these instructions please call Maternal Fetal Medicine nurse line today @ # [phone]

## 2020-09-20 NOTE — PATIENT INSTRUCTIONS
Thank you for choosing us for your  care today  If you have any questions about your ultrasound or care, please do not hesitate to contact us or your primary obstetrician  Please begin taking aspirin 162mg daily (two of the 81mg tablets) for the prevention of preeclampsia  If you have asthma and notice an increase in symptom frequency or severity, consider stopping this medication  Some general instructions for your pregnancy are:     Exercise: Aim for 22 minutes per day (150 minutes per week!) of regular exercise  This is obviously hard to do during a pandemic but walking is great   Nutrition: aim for calcium-rich and iron-rich foods as well as healthy sources of protein  This will help you gain a healthy amount of weight   Protect against the flu: get yourself and your entire household vaccinated against influenza   Protect against coronavirus: practice social distancing, wear a mask in public, and wash your hands often  This virus can be spread easily by people without symptoms  Notify your primary care doctor if you have any symptoms including cough, shortness of breath or difficulty breathing, fever, chills, muscle pain, sore throat, or new loss of taste or smell   Learn about Preeclampsia: preeclampsia is a common, serious complication in pregnancy  A blood pressure of 140mmHg (top number or systolic) OR 08DDVO (bottom number or diastolic) is not normal and needs evaluation by your doctor   If you smoke, try to reduce how many cigarettes you smoke or quit completely  Do not vape   Other warning signs to watch out for in pregnancy or postpartum: chest pain, obstructed breathing or shortness of breath, seizures, thoughts of hurting yourself or your baby, bleeding, a painful or swollen leg, fever, or headache ([facility] POST-BIRTH Warning Signs campaign)  If these happen call 911    Itching is also not normal in pregnancy and if you experience this, especially over your hands and feet, potentially worse at night, notify your doctors

## 2020-09-21 ENCOUNTER — ULTRASOUND (OUTPATIENT)
Dept: PERINATAL CARE | Facility: OTHER | Age: 35
End: 2020-09-21
Payer: COMMERCIAL

## 2020-09-21 ENCOUNTER — LAB (OUTPATIENT)
Dept: LAB | Facility: CLINIC | Age: 35
End: 2020-09-21
Payer: COMMERCIAL

## 2020-09-21 VITALS
WEIGHT: 237.2 LBS | SYSTOLIC BLOOD PRESSURE: 113 MMHG | BODY MASS INDEX: 38.12 KG/M2 | HEIGHT: 66 IN | DIASTOLIC BLOOD PRESSURE: 75 MMHG | HEART RATE: 80 BPM | TEMPERATURE: 98.2 F

## 2020-09-21 DIAGNOSIS — Z36.3 ENCOUNTER FOR ANTENATAL SCREENING FOR MALFORMATIONS: ICD-10-CM

## 2020-09-21 DIAGNOSIS — O99.210 OBESITY AFFECTING PREGNANCY, ANTEPARTUM: ICD-10-CM

## 2020-09-21 DIAGNOSIS — Z3A.09 9 WEEKS GESTATION OF PREGNANCY: ICD-10-CM

## 2020-09-21 DIAGNOSIS — Z36.87 ENCOUNTER FOR ANTENATAL SCREENING FOR UNCERTAIN DATES: Primary | ICD-10-CM

## 2020-09-21 DIAGNOSIS — Z3A.15 15 WEEKS GESTATION OF PREGNANCY: ICD-10-CM

## 2020-09-21 LAB — GLUCOSE 1H P 50 G GLC PO SERPL-MCNC: 73 MG/DL

## 2020-09-21 PROCEDURE — 36415 COLL VENOUS BLD VENIPUNCTURE: CPT

## 2020-09-21 PROCEDURE — 99242 OFF/OP CONSLTJ NEW/EST SF 20: CPT | Performed by: OBSTETRICS & GYNECOLOGY

## 2020-09-21 PROCEDURE — 82950 GLUCOSE TEST: CPT

## 2020-09-21 PROCEDURE — 76805 OB US >/= 14 WKS SNGL FETUS: CPT | Performed by: OBSTETRICS & GYNECOLOGY

## 2020-09-21 NOTE — LETTER
September 21, 2020     Aubree Giordano, [address] [id number]    Patient: Jun Stanley   YOB: 1985   Date of Visit: 9/21/2020       Dear Shalini Trammell: Thank you for referring Jun Stanley to me for evaluation  Below are my notes for this consultation  If you have questions, please do not hesitate to call me  I look forward to following your patient along with you  Sincerely,        Dmitri Eric MD        CC: No Recipients  Dmitri Eric MD  9/21/2020  4:16 PM  Sign when Signing Visit  Josh Hutchison: Ms Stefan Lorenzo was seen today at 15w6d for dating ultrasound  See ultrasound report under "OB Procedures" tab  Please don't hesitate to contact our office with any concerns or questions    Dmitri Eric MD

## 2020-09-21 NOTE — PROGRESS NOTES
[facility]: Ms Tammy Bonilla was seen today at 15w6d for dating ultrasound  See ultrasound report under "OB Procedures" tab  Please don't hesitate to contact our office with any concerns or questions    Alvin Lopez MD

## 2020-09-24 LAB
2ND TRIMESTER 4 SCREEN SERPL-IMP: NORMAL
AFP ADJ MOM SERPL: 1.09
AFP INTERP AMN-IMP: NORMAL
AFP INTERP SERPL-IMP: NORMAL
AFP INTERP SERPL-IMP: NORMAL
AFP SERPL-MCNC: 25.5 NG/ML
AGE AT DELIVERY: 37.8 YR
GA METHOD: NORMAL
GA: 15.4 WEEKS
IDDM PATIENT QL: NO
MULTIPLE PREGNANCY: NO
NEURAL TUBE DEFECT RISK FETUS: 9231 %

## 2020-09-25 ENCOUNTER — NURSE TRIAGE (OUTPATIENT)
Dept: OTHER | Facility: OTHER | Age: 35
End: 2020-09-25

## 2020-09-25 DIAGNOSIS — Z20.828 SARS-ASSOCIATED CORONAVIRUS EXPOSURE: Primary | ICD-10-CM

## 2020-09-25 DIAGNOSIS — Z20.828 SARS-ASSOCIATED CORONAVIRUS EXPOSURE: ICD-10-CM

## 2020-09-25 PROCEDURE — U0003 INFECTIOUS AGENT DETECTION BY NUCLEIC ACID (DNA OR RNA); SEVERE ACUTE RESPIRATORY SYNDROME CORONAVIRUS 2 (SARS-COV-2) (CORONAVIRUS DISEASE [COVID-19]), AMPLIFIED PROBE TECHNIQUE, MAKING USE OF HIGH THROUGHPUT TECHNOLOGIES AS DESCRIBED BY CMS-2020-01-R: HCPCS | Performed by: FAMILY MEDICINE

## 2020-09-25 NOTE — TELEPHONE ENCOUNTER
Reason for Disposition   [1] COVID-19 infection suspected by caller or triager AND [2] mild symptoms (cough, fever, or others) AND [8] no complications or SOB    Answer Assessment - Initial Assessment Questions  1  COVID-19 DIAGNOSIS: "Who made your Coronavirus (COVID-19) diagnosis?" "Was it confirmed by a positive lab test?" If not diagnosed by a HCP, ask "Are there lots of cases (community spread) where you live?" (See public health department website, if unsure)      Community  2  ONSET: "When did the COVID-19 symptoms start?"       2 days  3  WORST SYMPTOM: "What is your worst symptom?" (e g , cough, fever, shortness of breath, muscle aches)      Sore throat  4  COUGH: "Do you have a cough?" If so, ask: "How bad is the cough?"        Very mild dry cough  5  FEVER: "Do you have a fever?" If so, ask: "What is your temperature, how was it measured, and when did it start?"      Denies - has not taken with thermometer  No chills or shivering  6  RESPIRATORY STATUS: "Describe your breathing?" (e g , shortness of breath, wheezing, unable to speak)       Unremarkable - able to converse with ease  7  BETTER-SAME-WORSE: "Are you getting better, staying the same or getting worse compared to yesterday?"  If getting worse, ask, "In what way?"      Better  8  HIGH RISK DISEASE: "Do you have any chronic medical problems?" (e g , asthma, heart or lung disease, weak immune system, etc )      Denies  9  PREGNANCY: "Is there any chance you are pregnant?" "When was your last menstrual period?"      15 weeks pregnanct  10   OTHER SYMPTOMS: "Do you have any other symptoms?"  (e g , chills, fatigue, headache, loss of smell or taste, muscle pain, sore throat)        Nasal congestion, rhinnorhea    Protocols used: CORONAVIRUS (COVID-19) [facility]

## 2020-09-25 NOTE — TELEPHONE ENCOUNTER
Regarding: COVID-19;SYMPTOMATIC  ----- Message from Jez Ross sent at 9/25/2020  3:09 PM EDT -----  Patient calling because she has developed cold symptoms about 2 days ago  Patient is pregnant

## 2020-09-26 LAB — SARS-COV-2 RNA SPEC QL NAA+PROBE: NOT DETECTED

## 2020-09-28 ENCOUNTER — TELEPHONE (OUTPATIENT)
Dept: OBGYN CLINIC | Facility: CLINIC | Age: 35
End: 2020-09-28

## 2020-10-13 ENCOUNTER — ROUTINE PRENATAL (OUTPATIENT)
Dept: OBGYN CLINIC | Facility: CLINIC | Age: 35
End: 2020-10-13

## 2020-10-13 VITALS
SYSTOLIC BLOOD PRESSURE: 120 MMHG | RESPIRATION RATE: 16 BRPM | HEART RATE: 90 BPM | BODY MASS INDEX: 38.35 KG/M2 | WEIGHT: 237.6 LBS | TEMPERATURE: 98 F | DIASTOLIC BLOOD PRESSURE: 82 MMHG

## 2020-10-13 DIAGNOSIS — Z3A.19 19 WEEKS GESTATION OF PREGNANCY: Primary | ICD-10-CM

## 2020-10-13 PROCEDURE — 99213 OFFICE O/P EST LOW 20 MIN: CPT | Performed by: FAMILY MEDICINE

## 2020-10-13 RX ORDER — ASPIRIN 81 MG/1
162 TABLET ORAL DAILY
Qty: 30 TABLET | Refills: 3 | Status: SHIPPED | OUTPATIENT
Start: 2020-10-13 | End: 2020-11-03

## 2020-10-27 ENCOUNTER — TELEPHONE (OUTPATIENT)
Dept: PERINATAL CARE | Facility: CLINIC | Age: 35
End: 2020-10-27

## 2020-10-28 ENCOUNTER — ROUTINE PRENATAL (OUTPATIENT)
Dept: PERINATAL CARE | Facility: OTHER | Age: 35
End: 2020-10-28
Payer: COMMERCIAL

## 2020-10-28 VITALS
WEIGHT: 238 LBS | DIASTOLIC BLOOD PRESSURE: 81 MMHG | TEMPERATURE: 97.7 F | HEART RATE: 81 BPM | BODY MASS INDEX: 38.25 KG/M2 | SYSTOLIC BLOOD PRESSURE: 120 MMHG | HEIGHT: 66 IN

## 2020-10-28 DIAGNOSIS — Z3A.21 21 WEEKS GESTATION OF PREGNANCY: ICD-10-CM

## 2020-10-28 DIAGNOSIS — Z36.86 ENCOUNTER FOR ANTENATAL SCREENING FOR CERVICAL LENGTH: ICD-10-CM

## 2020-10-28 DIAGNOSIS — O09.522 AMA (ADVANCED MATERNAL AGE) MULTIGRAVIDA 35+, SECOND TRIMESTER: ICD-10-CM

## 2020-10-28 DIAGNOSIS — O99.210 OBESITY AFFECTING PREGNANCY, ANTEPARTUM: Primary | ICD-10-CM

## 2020-10-28 PROCEDURE — 76817 TRANSVAGINAL US OBSTETRIC: CPT | Performed by: OBSTETRICS & GYNECOLOGY

## 2020-10-28 PROCEDURE — 76811 OB US DETAILED SNGL FETUS: CPT | Performed by: OBSTETRICS & GYNECOLOGY

## 2020-10-28 PROCEDURE — 99212 OFFICE O/P EST SF 10 MIN: CPT | Performed by: OBSTETRICS & GYNECOLOGY

## 2020-11-03 DIAGNOSIS — Z3A.19 19 WEEKS GESTATION OF PREGNANCY: ICD-10-CM

## 2020-11-03 RX ORDER — ASPIRIN 81 MG/1
TABLET ORAL
Qty: 30 TABLET | Refills: 3 | Status: SHIPPED | OUTPATIENT
Start: 2020-11-03 | End: 2020-11-22

## 2020-11-10 ENCOUNTER — ROUTINE PRENATAL (OUTPATIENT)
Dept: OBGYN CLINIC | Facility: CLINIC | Age: 35
End: 2020-11-10

## 2020-11-10 VITALS
SYSTOLIC BLOOD PRESSURE: 109 MMHG | BODY MASS INDEX: 39.22 KG/M2 | WEIGHT: 243 LBS | DIASTOLIC BLOOD PRESSURE: 74 MMHG | HEART RATE: 87 BPM

## 2020-11-10 DIAGNOSIS — Z3A.23 23 WEEKS GESTATION OF PREGNANCY: Primary | ICD-10-CM

## 2020-11-10 PROCEDURE — 99213 OFFICE O/P EST LOW 20 MIN: CPT | Performed by: FAMILY MEDICINE

## 2020-11-16 ENCOUNTER — TELEPHONE (OUTPATIENT)
Dept: PERINATAL CARE | Facility: CLINIC | Age: 35
End: 2020-11-16

## 2020-11-17 ENCOUNTER — ULTRASOUND (OUTPATIENT)
Dept: PERINATAL CARE | Facility: CLINIC | Age: 35
End: 2020-11-17
Payer: COMMERCIAL

## 2020-11-17 VITALS
TEMPERATURE: 97.6 F | DIASTOLIC BLOOD PRESSURE: 66 MMHG | WEIGHT: 241.6 LBS | HEART RATE: 80 BPM | SYSTOLIC BLOOD PRESSURE: 110 MMHG | BODY MASS INDEX: 38.83 KG/M2 | HEIGHT: 66 IN

## 2020-11-17 DIAGNOSIS — IMO0002 EVALUATE ANATOMY NOT SEEN ON PRIOR SONOGRAM: Primary | ICD-10-CM

## 2020-11-17 DIAGNOSIS — O09.522 MULTIGRAVIDA OF ADVANCED MATERNAL AGE IN SECOND TRIMESTER: ICD-10-CM

## 2020-11-17 PROBLEM — N91.2 AMENORRHEA: Status: RESOLVED | Noted: 2020-06-02 | Resolved: 2020-11-17

## 2020-11-17 PROCEDURE — 99212 OFFICE O/P EST SF 10 MIN: CPT | Performed by: OBSTETRICS & GYNECOLOGY

## 2020-11-17 PROCEDURE — 76816 OB US FOLLOW-UP PER FETUS: CPT | Performed by: OBSTETRICS & GYNECOLOGY

## 2020-11-21 DIAGNOSIS — Z3A.19 19 WEEKS GESTATION OF PREGNANCY: ICD-10-CM

## 2020-11-22 RX ORDER — ASPIRIN 81 MG/1
TABLET ORAL
Qty: 30 TABLET | Refills: 3 | Status: SHIPPED | OUTPATIENT
Start: 2020-11-22 | End: 2020-12-07

## 2020-12-06 DIAGNOSIS — Z3A.19 19 WEEKS GESTATION OF PREGNANCY: ICD-10-CM

## 2020-12-07 RX ORDER — ASPIRIN 81 MG/1
TABLET ORAL
Qty: 180 TABLET | Refills: 1 | Status: SHIPPED | OUTPATIENT
Start: 2020-12-07 | End: 2021-07-14 | Stop reason: ALTCHOICE

## 2020-12-08 ENCOUNTER — TELEPHONE (OUTPATIENT)
Dept: OBGYN CLINIC | Facility: CLINIC | Age: 35
End: 2020-12-08

## 2020-12-08 ENCOUNTER — TELEMEDICINE (OUTPATIENT)
Dept: OBGYN CLINIC | Facility: CLINIC | Age: 35
End: 2020-12-08

## 2020-12-08 DIAGNOSIS — Z3A.27 27 WEEKS GESTATION OF PREGNANCY: ICD-10-CM

## 2020-12-08 DIAGNOSIS — Z34.92 SECOND TRIMESTER PREGNANCY: Primary | ICD-10-CM

## 2020-12-08 PROCEDURE — 99213 OFFICE O/P EST LOW 20 MIN: CPT | Performed by: NURSE PRACTITIONER

## 2020-12-08 PROCEDURE — 1036F TOBACCO NON-USER: CPT | Performed by: NURSE PRACTITIONER

## 2020-12-21 DIAGNOSIS — Z20.822 CLOSE EXPOSURE TO COVID-19 VIRUS: ICD-10-CM

## 2020-12-21 PROCEDURE — U0003 INFECTIOUS AGENT DETECTION BY NUCLEIC ACID (DNA OR RNA); SEVERE ACUTE RESPIRATORY SYNDROME CORONAVIRUS 2 (SARS-COV-2) (CORONAVIRUS DISEASE [COVID-19]), AMPLIFIED PROBE TECHNIQUE, MAKING USE OF HIGH THROUGHPUT TECHNOLOGIES AS DESCRIBED BY CMS-2020-01-R: HCPCS | Performed by: FAMILY MEDICINE

## 2020-12-22 ENCOUNTER — TELEMEDICINE (OUTPATIENT)
Dept: FAMILY MEDICINE CLINIC | Facility: CLINIC | Age: 35
End: 2020-12-22
Payer: COMMERCIAL

## 2020-12-22 VITALS — HEIGHT: 66 IN | WEIGHT: 247 LBS | BODY MASS INDEX: 39.7 KG/M2

## 2020-12-22 DIAGNOSIS — U07.1 COVID-19 VIRUS INFECTION: Primary | ICD-10-CM

## 2020-12-22 LAB — SARS-COV-2 RNA SPEC QL NAA+PROBE: NOT DETECTED

## 2020-12-22 PROCEDURE — 1036F TOBACCO NON-USER: CPT | Performed by: FAMILY MEDICINE

## 2020-12-22 PROCEDURE — 3725F SCREEN DEPRESSION PERFORMED: CPT | Performed by: FAMILY MEDICINE

## 2020-12-22 PROCEDURE — 99213 OFFICE O/P EST LOW 20 MIN: CPT | Performed by: FAMILY MEDICINE

## 2020-12-28 ENCOUNTER — TELEPHONE (OUTPATIENT)
Dept: OBGYN CLINIC | Facility: CLINIC | Age: 35
End: 2020-12-28

## 2020-12-28 PROBLEM — Z34.93 THIRD TRIMESTER PREGNANCY: Status: ACTIVE | Noted: 2020-09-14

## 2020-12-28 PROBLEM — Z3A.29 29 WEEKS GESTATION OF PREGNANCY: Status: ACTIVE | Noted: 2020-09-15

## 2020-12-29 ENCOUNTER — ROUTINE PRENATAL (OUTPATIENT)
Dept: OBGYN CLINIC | Facility: CLINIC | Age: 35
End: 2020-12-29

## 2020-12-29 VITALS
HEART RATE: 80 BPM | SYSTOLIC BLOOD PRESSURE: 120 MMHG | BODY MASS INDEX: 40.18 KG/M2 | WEIGHT: 250 LBS | DIASTOLIC BLOOD PRESSURE: 80 MMHG | HEIGHT: 66 IN

## 2020-12-29 DIAGNOSIS — Z3A.29 29 WEEKS GESTATION OF PREGNANCY: Primary | ICD-10-CM

## 2020-12-29 DIAGNOSIS — Z34.93 THIRD TRIMESTER PREGNANCY: ICD-10-CM

## 2020-12-29 DIAGNOSIS — O99.213 OBESITY IN PREGNANCY, ANTEPARTUM, THIRD TRIMESTER: ICD-10-CM

## 2020-12-29 LAB
SL AMB  POCT GLUCOSE, UA: NEGATIVE
SL AMB POCT URINE PROTEIN: ABNORMAL

## 2020-12-29 PROCEDURE — 90471 IMMUNIZATION ADMIN: CPT | Performed by: NURSE PRACTITIONER

## 2020-12-29 PROCEDURE — 3008F BODY MASS INDEX DOCD: CPT | Performed by: NURSE PRACTITIONER

## 2020-12-29 PROCEDURE — 90472 IMMUNIZATION ADMIN EACH ADD: CPT | Performed by: NURSE PRACTITIONER

## 2020-12-29 PROCEDURE — 90715 TDAP VACCINE 7 YRS/> IM: CPT | Performed by: NURSE PRACTITIONER

## 2020-12-29 PROCEDURE — 90686 IIV4 VACC NO PRSV 0.5 ML IM: CPT | Performed by: NURSE PRACTITIONER

## 2020-12-29 PROCEDURE — 99213 OFFICE O/P EST LOW 20 MIN: CPT | Performed by: NURSE PRACTITIONER

## 2020-12-29 PROCEDURE — 81002 URINALYSIS NONAUTO W/O SCOPE: CPT | Performed by: NURSE PRACTITIONER

## 2020-12-29 PROCEDURE — 3008F BODY MASS INDEX DOCD: CPT | Performed by: FAMILY MEDICINE

## 2021-01-04 ENCOUNTER — LAB (OUTPATIENT)
Dept: LAB | Facility: CLINIC | Age: 36
End: 2021-01-04
Payer: COMMERCIAL

## 2021-01-04 DIAGNOSIS — Z3A.23 23 WEEKS GESTATION OF PREGNANCY: ICD-10-CM

## 2021-01-04 LAB
BASOPHILS # BLD AUTO: 0.03 THOUSANDS/ΜL (ref 0–0.1)
BASOPHILS NFR BLD AUTO: 0 % (ref 0–1)
EOSINOPHIL # BLD AUTO: 0.08 THOUSAND/ΜL (ref 0–0.61)
EOSINOPHIL NFR BLD AUTO: 1 % (ref 0–6)
ERYTHROCYTE [DISTWIDTH] IN BLOOD BY AUTOMATED COUNT: 12.8 % (ref 11.6–15.1)
GLUCOSE 1H P 50 G GLC PO SERPL-MCNC: 123 MG/DL
HCT VFR BLD AUTO: 36.3 % (ref 34.8–46.1)
HGB BLD-MCNC: 11.8 G/DL (ref 11.5–15.4)
IMM GRANULOCYTES # BLD AUTO: 0.16 THOUSAND/UL (ref 0–0.2)
IMM GRANULOCYTES NFR BLD AUTO: 2 % (ref 0–2)
LYMPHOCYTES # BLD AUTO: 1.43 THOUSANDS/ΜL (ref 0.6–4.47)
LYMPHOCYTES NFR BLD AUTO: 14 % (ref 14–44)
MCH RBC QN AUTO: 30.3 PG (ref 26.8–34.3)
MCHC RBC AUTO-ENTMCNC: 32.5 G/DL (ref 31.4–37.4)
MCV RBC AUTO: 93 FL (ref 82–98)
MONOCYTES # BLD AUTO: 0.52 THOUSAND/ΜL (ref 0.17–1.22)
MONOCYTES NFR BLD AUTO: 5 % (ref 4–12)
NEUTROPHILS # BLD AUTO: 7.8 THOUSANDS/ΜL (ref 1.85–7.62)
NEUTS SEG NFR BLD AUTO: 78 % (ref 43–75)
NRBC BLD AUTO-RTO: 0 /100 WBCS
PLATELET # BLD AUTO: 224 THOUSANDS/UL (ref 149–390)
PMV BLD AUTO: 10.2 FL (ref 8.9–12.7)
RBC # BLD AUTO: 3.89 MILLION/UL (ref 3.81–5.12)
WBC # BLD AUTO: 10.02 THOUSAND/UL (ref 4.31–10.16)

## 2021-01-04 PROCEDURE — 82950 GLUCOSE TEST: CPT

## 2021-01-04 PROCEDURE — 85025 COMPLETE CBC W/AUTO DIFF WBC: CPT

## 2021-01-04 PROCEDURE — 36415 COLL VENOUS BLD VENIPUNCTURE: CPT

## 2021-01-04 PROCEDURE — 86592 SYPHILIS TEST NON-TREP QUAL: CPT

## 2021-01-05 LAB — RPR SER QL: NORMAL

## 2021-01-11 ENCOUNTER — TELEPHONE (OUTPATIENT)
Dept: PERINATAL CARE | Facility: OTHER | Age: 36
End: 2021-01-11

## 2021-01-11 NOTE — TELEPHONE ENCOUNTER
Attempted to contact patient for covid prescreen - was unable to leave a voicemail due to mailbox full  How Severe Is Your Skin Lesion?: mild Have Your Skin Lesions Been Treated?: not been treated Is This A New Presentation, Or A Follow-Up?: Skin Lesions

## 2021-01-12 ENCOUNTER — ROUTINE PRENATAL (OUTPATIENT)
Dept: OBGYN CLINIC | Facility: CLINIC | Age: 36
End: 2021-01-12

## 2021-01-12 ENCOUNTER — ULTRASOUND (OUTPATIENT)
Dept: PERINATAL CARE | Facility: OTHER | Age: 36
End: 2021-01-12
Payer: COMMERCIAL

## 2021-01-12 VITALS
WEIGHT: 251 LBS | DIASTOLIC BLOOD PRESSURE: 77 MMHG | SYSTOLIC BLOOD PRESSURE: 115 MMHG | HEIGHT: 66 IN | HEART RATE: 90 BPM | BODY MASS INDEX: 40.34 KG/M2

## 2021-01-12 VITALS
WEIGHT: 251.32 LBS | HEIGHT: 66 IN | SYSTOLIC BLOOD PRESSURE: 136 MMHG | HEART RATE: 97 BPM | BODY MASS INDEX: 40.39 KG/M2 | DIASTOLIC BLOOD PRESSURE: 82 MMHG

## 2021-01-12 DIAGNOSIS — Z3A.32 32 WEEKS GESTATION OF PREGNANCY: Primary | ICD-10-CM

## 2021-01-12 DIAGNOSIS — E66.01 MATERNAL MORBID OBESITY IN THIRD TRIMESTER, ANTEPARTUM (HCC): Primary | ICD-10-CM

## 2021-01-12 DIAGNOSIS — O99.213 MATERNAL MORBID OBESITY IN THIRD TRIMESTER, ANTEPARTUM (HCC): Primary | ICD-10-CM

## 2021-01-12 DIAGNOSIS — O09.523 ELDERLY MULTIGRAVIDA, THIRD TRIMESTER: ICD-10-CM

## 2021-01-12 DIAGNOSIS — Z3A.32 32 WEEKS GESTATION OF PREGNANCY: ICD-10-CM

## 2021-01-12 PROCEDURE — 3008F BODY MASS INDEX DOCD: CPT | Performed by: OBSTETRICS & GYNECOLOGY

## 2021-01-12 PROCEDURE — 76816 OB US FOLLOW-UP PER FETUS: CPT | Performed by: OBSTETRICS & GYNECOLOGY

## 2021-01-12 PROCEDURE — 99213 OFFICE O/P EST LOW 20 MIN: CPT | Performed by: OBSTETRICS & GYNECOLOGY

## 2021-01-12 PROCEDURE — 1036F TOBACCO NON-USER: CPT | Performed by: OBSTETRICS & GYNECOLOGY

## 2021-01-12 NOTE — LETTER
January 12, 2021     [address]  PROVIDER    Patient: Jodi Medina   YOB: 1985   Date of Visit: 1/12/2021       Dear   Provider: Thank you for referring Jodi Medina to me for evaluation  Below are my notes for this consultation  If you have questions, please do not hesitate to call me  I look forward to following your patient along with you  Sincerely,        Sherwin Nix MD        CC: No Recipients  Sherwin Nix MD  1/12/2021 11:31 AM  Sign when Signing Visit  Please refer to the [facility] ultrasound report in Ob Procedures for additional information regarding today's visit

## 2021-01-12 NOTE — PROGRESS NOTES
Please refer to the [facility] ultrasound report in Ob Procedures for additional information regarding today's visit

## 2021-01-12 NOTE — PROGRESS NOTES
Subjective     Robyn Odette Essex is a 38 y o  female being seen today for her obstetrical visit  She is at 32w0d gestation  Patient reports she is doing well without any complaints  Fetal movement: normal     Menstrual History:  OB History        3    Para   2    Term   2            AB        Living   2       SAB        TAB        Ectopic        Multiple        Live Births   2                Menarche age: 13  Patient's last menstrual period was 2020 (lmp unknown)  The following portions of the patient's history were reviewed and updated as appropriate: allergies, current medications, past family history, past medical history, past social history, past surgical history and problem list     Review of Systems  Constitutional: negative for chills and fevers  Respiratory: negative for cough and dyspnea on exertion  Cardiovascular: negative for chest pain  Gastrointestinal: negative for abdominal pain, constipation and diarrhea  Genitourinary:negative for dysuria and frequency  Musculoskeletal:negative for back pain     Objective     /77 (BP Location: Left arm, Patient Position: Sitting, Cuff Size: Adult)   Pulse 90   Ht 5' 6" (1 676 m)   Wt 114 kg (251 lb)   LMP 2020 (LMP Unknown) Comment: no period since 10/18  BMI 40 51 kg/m²   FHT: 142 BPM   Uterine Size: size equals dates   Presentation: cephalic      Physical Exam  Cardiovascular:      Rate and Rhythm: Normal rate and regular rhythm  Pulses: Normal pulses  Heart sounds: Normal heart sounds  Pulmonary:      Effort: Pulmonary effort is normal       Breath sounds: Normal breath sounds  Abdominal:      General: Bowel sounds are normal       Palpations: Abdomen is soft  Musculoskeletal:         General: No swelling  Right lower leg: No edema  Left lower leg: No edema  Neurological:      General: No focal deficit present  Mental Status: She is oriented to person, place, and time     Psychiatric: Mood and Affect: Mood normal          Behavior: Behavior normal        Assessment  32 week gestation Plan     28-week labs reviewed, normal  Patient advised to call physician if she has any vaginal bleeding, leakage of fluid or decreased fetal movement   discussed signs of  labor   Follow up in 2 Weeks

## 2021-01-12 NOTE — PATIENT INSTRUCTIONS
Kick Counts in Pregnancy   WHAT YOU NEED TO KNOW:   Kick counts measure how much your baby is moving in your womb  A kick from your baby can be felt as a twist, turn, swish, roll, or jab  It is common to feel your baby kicking at 26 to 28 weeks of pregnancy  You may feel your baby kick as early as 20 weeks of pregnancy  You may want to start counting at 28 weeks  DISCHARGE INSTRUCTIONS:   Contact your healthcare provider immediately if:   · You feel a change in the number of kicks or movements of your baby  · You feel fewer than 10 kicks within 2 hours  · You have questions or concerns about your baby's movements  Why measure kick counts:  Your baby's movement may provide information about your baby's health  He or she may move less, or not at all, if there are problems  Your baby may move less if he or she is not getting enough oxygen or nutrition from the placenta  Do not smoke while you are pregnant  Smoking decreases the amount of oxygen that gets to your baby  Talk to your healthcare provider if you need help to quit smoking  Tell your healthcare provider as soon as you feel a change in your baby's movements  When to measure kick counts:   · Measure kick counts at the same time every day  · Measure kick counts when your baby is awake and most active  Your baby may be most active in the evening  How to measure kick counts:  Check that your baby is awake before you measure kick counts  You can wake up your baby by lightly pushing on your belly, walking, or drinking something cold  Your healthcare provider may tell you different ways to measure kick counts  You may be told to do the following:  · Use a chart or clock to keep track of the time you start and finish counting  · Sit in a chair or lie on your left side  · Place your hands on the largest part of your belly  · Count until you reach 10 kicks  Write down how much time it takes to count 10 kicks       · It may take 30 minutes to 2 hours to count 10 kicks  It should not take more than 2 hours to count 10 kicks  Follow up with your healthcare provider as directed:  Write down your questions so you remember to ask them during your visits  © Copyright [address] Information is for End User's use only and may not be sold, redistributed or otherwise used for commercial purposes  All illustrations and images included in CareNotes® are the copyrighted property of A D A M , Inc  or [facility] [employer]era Ma   The above information is an  only  It is not intended as medical advice for individual conditions or treatments  Talk to your doctor, nurse or pharmacist before following any medical regimen to see if it is safe and effective for you

## 2021-01-25 NOTE — PROGRESS NOTES
Nicole Bull presents today for routine OB visit at 33w6d  Blood Pressure: 120/80  Gr=321 kg (254 lb 6 4 oz); Body mass index is 41 06 kg/m² ; TWG=Not found  Has  Gained 22 lb total in pregnancy  Fetal Heart Rate: 143; Abdomen: gravid, soft, non-tender  Denies uterine contractions  Denies vaginal bleeding or leaking of fluid  Reports adequate fetal movement of at least 10 movements in 2 hours once daily  BMI of 40 5- APFS beginning at 36 weeks, scheduled for  for 2/ 9   AMA- LDASA 162 mgs daily   her 1 hour GTT was 123 on 01/04/2021,  O-positive blood type  Her last ultrasound was on 01/12/2021, vertex,  At 33 weeks and 2 days with ARLYN 02/28/2021  Scheduled for ultrasound  Is indicated  vaccinations- has had Tdap and flu vaccine   GBS next appointment  Plans on breastfeeding  Perineal massage  Review to decrease risk of tearing and information provided  Reviewed premature labor precautions,  Preeclamptic precautions and fetal kick counts  Advised to continue medications and return in 2 weeks  Current Outpatient Medications   Medication Instructions    aspirin (ECOTRIN LOW STRENGTH) 81 mg EC tablet TAKE 2 TABLETS (162 MG TOTAL) BY MOUTH DAILY    Prenatal Vit-Fe Fumarate-FA (PRENATAL 1+1 PO) Oral         Pregnancy Problems (from 06/02/20 to present)     No problems associated with this episode

## 2021-01-26 ENCOUNTER — ROUTINE PRENATAL (OUTPATIENT)
Dept: OBGYN CLINIC | Facility: CLINIC | Age: 36
End: 2021-01-26

## 2021-01-26 VITALS
DIASTOLIC BLOOD PRESSURE: 80 MMHG | WEIGHT: 254.4 LBS | HEART RATE: 90 BPM | BODY MASS INDEX: 41.06 KG/M2 | SYSTOLIC BLOOD PRESSURE: 120 MMHG | RESPIRATION RATE: 16 BRPM

## 2021-01-26 DIAGNOSIS — O99.213 OBESITY IN PREGNANCY, ANTEPARTUM, THIRD TRIMESTER: Primary | ICD-10-CM

## 2021-01-26 DIAGNOSIS — Z3A.34 34 WEEKS GESTATION OF PREGNANCY: ICD-10-CM

## 2021-01-26 PROCEDURE — 99213 OFFICE O/P EST LOW 20 MIN: CPT | Performed by: NURSE PRACTITIONER

## 2021-01-26 NOTE — PATIENT INSTRUCTIONS
Pregnancy at 28 to 38 Weeks   AMBULATORY CARE:   What changes are happening to your body: You are considered full term at the beginning of 37 weeks  Your breathing may be easier if your baby has moved down into a head-down position  You may need to urinate more often because the baby may be pressing on your bladder  You may also feel more discomfort and get tired easily  Seek care immediately if:   · You develop a severe headache that does not go away  · You have new or increased vision changes, such as blurred or spotted vision  · You have new or increased swelling in your face or hands  · You have vaginal spotting or bleeding  · Your water broke or you feel warm water gushing or trickling from your vagina  Contact your healthcare provider if:   · You have more than 5 contractions in 1 hour  · You notice any changes in your baby's movements  · You have abdominal cramps, pressure, or tightening  · You have a change in vaginal discharge  · You have chills or a fever  · You have vaginal itching, burning, or pain  · You have yellow, green, white, or foul-smelling vaginal discharge  · You have pain or burning when you urinate, less urine than usual, or pink or bloody urine  · You have questions or concerns about your condition or care  How to care for yourself at this stage of your pregnancy:   · Eat a variety of healthy foods  Healthy foods include fruits, vegetables, whole-grain breads, low-fat dairy foods, beans, lean meats, and fish  Drink liquids as directed  Ask how much liquid to drink each day and which liquids are best for you  Limit caffeine to less than 200 milligrams each day  Limit your intake of fish to 2 servings each week  Choose fish low in mercury such as canned light tuna, shrimp, salmon, cod, or tilapia  Do not  eat fish high in mercury such as swordfish, tilefish, ed mackerel, and shark  · Take prenatal vitamins as directed    Your need for certain vitamins and minerals, such as folic acid, increases during pregnancy  Prenatal vitamins provide some of the extra vitamins and minerals you need  Prenatal vitamins may also help to decrease the risk of certain birth defects  · Rest as needed  Put your feet up if you have swelling in your ankles and feet  · Do not smoke  Smoking increases your risk of a miscarriage and other health problems during your pregnancy  Smoking can cause your baby to be born early or weigh less at birth  Ask your healthcare provider for information if you need help quitting  · Do not drink alcohol  Alcohol passes from your body to your baby through the placenta  It can affect your baby's brain development and cause fetal alcohol syndrome (FAS)  FAS is a group of conditions that causes mental, behavior, and growth problems  · Talk to your healthcare provider before you take any medicines  Many medicines may harm your baby if you take them when you are pregnant  Do not take any medicines, vitamins, herbs, or supplements without first talking to your healthcare provider  Never use illegal or street drugs (such as marijuana or cocaine) while you are pregnant  · Talk to your healthcare provider before you travel  You may not be able to travel in an airplane after 36 weeks  He may also recommend that you avoid long road trips  Safety tips during pregnancy:   · Avoid hot tubs and saunas  Do not use a hot tub or sauna while you are pregnant, especially during your first trimester  Hot tubs and saunas may raise your baby's temperature and increase the risk of birth defects  · Avoid toxoplasmosis  This is an infection caused by eating raw meat or being around infected cat feces  It can cause birth defects, miscarriages, and other problems  Wash your hands after you touch raw meat  Make sure any meat is well-cooked before you eat it  Avoid raw eggs and unpasteurized milk   Use gloves or ask someone else to clean your cat's litter box while you are pregnant  · Ask your healthcare provider about travel  The most comfortable time to travel is during the second trimester  Ask your healthcare provider if you can travel after 36 weeks  You may not be able to travel in an airplane after 36 weeks  He may also recommend that you avoid long road trips  Changes that are happening with your baby:  By 38 weeks, your baby may weigh between 6 and 9 pounds  Your baby may be about 14 inches long from the top of the head to the rump (baby's bottom)  Your baby hears well enough to know your voice  As your baby gets larger, you may feel fewer kicks and more stretching and rolling  Your baby may move into a head-down position  Your baby will also rest lower in your abdomen  What you need to know about prenatal care: Your healthcare provider will check your blood pressure and weight  You may also need the following:  · A urine test  may also be done to check for sugar and protein  These can be signs of gestational diabetes or infection  Protein in your urine may also be a sign of preeclampsia  Preeclampsia is a condition that can develop during week 20 or later of your pregnancy  It causes high blood pressure, and it can cause problems with your kidneys and other organs  · A blood test  may be done to check for anemia (low iron level)  · A Tdap vaccine  may be recommended by your healthcare provider  · A group B strep test  is a test that is done to check for group B strep infection  Group B strep is a type of bacteria that may be found in the vagina or rectum  It can be passed to your baby during delivery if you have it  Your healthcare provider will take swab your vagina or rectum and send the sample to the lab for tests  · Fundal height  is a measurement of your uterus to check your baby's growth  This number is usually the same as the number of weeks that you have been pregnant   Your healthcare provider may also check your baby's position  · Your baby's heart rate  will be checked  © Copyright [address] Information is for End User's use only and may not be sold, redistributed or otherwise used for commercial purposes  All illustrations and images included in CareNotes® are the copyrighted property of A D A M , Inc  or [employer]   The above information is an  only  It is not intended as medical advice for individual conditions or treatments  Talk to your doctor, nurse or pharmacist before following any medical regimen to see if it is safe and effective for you  Fetal Movement   WHAT YOU NEED TO KNOW:   Fetal movements are the kicks, rolls, and hiccups of your unborn baby  You may start to feel these movements when you are 20 weeks pregnant  The movements grow stronger and more frequent as your baby grows  Fetal movements show that your unborn baby is getting the oxygen and nutrients he needs before birth  Fewer fetal movements may signal a problem with your baby's health  DISCHARGE INSTRUCTIONS:   Follow up with your healthcare provider or obstetrician as directed:  Write down your questions so you remember to ask them during your visits  Normal fetal movement:  Fetal activity can be described by 4 states, from least to most active  During quiet sleep, your unborn baby may be still for up to 2 hours  During active sleep, he kicks, rolls, and moves often  During the quiet awake state, he may only move his eyes  The active awake state includes strong kicks and rolls  What affects fetal movement:  You may feel your baby move more after you eat, or after you drink caffeine  You may feel your baby move less while you are more active, such as when you exercise  You may also feel fewer movements if you are obese  Certain medicines can change your baby's movements  Tell your healthcare provider about the medicines you are taking     Track fetal movements at home:  Fetal movement is most often felt when you lie quietly on your side  Your healthcare provider may ask you to count movements for 2 hours  He may ask you to track how long it takes for your baby to move 10 times  Keep a log of your baby's movements  Contact your healthcare provider or obstetrician if:   · It takes longer than usual to feel 10 of your unborn baby's movements  · You do not feel your unborn baby move at least 10 times in 2 hours  · The skin on your hands, feet, and around your eyes is more swollen than usual      · You have a headache for at least 24 hours  · Tiny red dots appear on your skin  · Your belly is tender when you press on it  · You have questions or concerns about your condition or care  Return to the emergency department if:   · You do not feel your unborn baby move for 12 hours  · You feel cramping or constant pain in your abdomen  · You have heavy bleeding from your vagina  · You have a severe headache and cannot see clearly  · You are having trouble breathing or are vomiting  · You have a seizure  © Copyright [address] Information is for End User's use only and may not be sold, redistributed or otherwise used for commercial purposes  All illustrations and images included in CareNotes® are the copyrighted property of A D A M , Inc  or [facility] Meera Ma   The above information is an  only  It is not intended as medical advice for individual conditions or treatments  Talk to your doctor, nurse or pharmacist before following any medical regimen to see if it is safe and effective for you

## 2021-02-08 ENCOUNTER — TELEPHONE (OUTPATIENT)
Dept: PERINATAL CARE | Facility: OTHER | Age: 36
End: 2021-02-08

## 2021-02-08 NOTE — TELEPHONE ENCOUNTER
Spoke with patient and confirmed appointment with [facility]  1 support person ( must be over age of 15) may accompany patient  Will you and your support person be able to wear a mask ,without a valve , during entire appointment? NO   To minimize your exposure in our waiting area,check in and rooming questions will be done via phone  When you arrive in the parking lot please call the following inside line # prior to entering office:    [facility]: [phone]    Have you or your support person traveled outside the state in the last 2 weeks? NO  If yes, what state did you travel to? Do you or your support person have:  Fever or flu- like symptoms? NO  Symptoms of upper respiratory infection like runny nose, sore throat or cough? NO  Do you have new headache that you have not had in the past?NO  Have you experienced any new shortness of breath recently? NO  Do you have any new loss of taste or smell? NO  Do you have any new diarrhea, nausea or vomiting? NO  Have you recently been in contact with anyone who has been sick or diagnosed with COVID-19 infection? NO  Have you been recommended to quarantine because of an exposure to a confirmed positive COVID19 person? NO  Have you recently been tested for COVID19? NO    Patient verbalized understanding of all instructions   -------------------------------------------------------------

## 2021-02-09 ENCOUNTER — ULTRASOUND (OUTPATIENT)
Dept: PERINATAL CARE | Facility: OTHER | Age: 36
End: 2021-02-09
Payer: COMMERCIAL

## 2021-02-09 VITALS
SYSTOLIC BLOOD PRESSURE: 117 MMHG | BODY MASS INDEX: 41.84 KG/M2 | HEART RATE: 94 BPM | DIASTOLIC BLOOD PRESSURE: 78 MMHG | WEIGHT: 260.36 LBS | HEIGHT: 66 IN

## 2021-02-09 DIAGNOSIS — Z3A.36 36 WEEKS GESTATION OF PREGNANCY: Primary | ICD-10-CM

## 2021-02-09 DIAGNOSIS — E66.01 MATERNAL MORBID OBESITY IN THIRD TRIMESTER, ANTEPARTUM (HCC): ICD-10-CM

## 2021-02-09 DIAGNOSIS — O99.213 MATERNAL MORBID OBESITY IN THIRD TRIMESTER, ANTEPARTUM (HCC): ICD-10-CM

## 2021-02-09 PROCEDURE — 59025 FETAL NON-STRESS TEST: CPT | Performed by: OBSTETRICS & GYNECOLOGY

## 2021-02-09 PROCEDURE — 76815 OB US LIMITED FETUS(S): CPT | Performed by: OBSTETRICS & GYNECOLOGY

## 2021-02-09 NOTE — LETTER
NST sleeve cover sheet    Patient name: Eden Dobson  : 1985  MRN: [id number]    ARLYN: Estimated Date of Delivery: 3/9/21    Obstetrician: _______________________________    Reason(s) for testing:  ________MO      Testing frequency:    ___ 2x/wk  _x_ 1x/wk  ___ Dopplers  ___ BPP?       Last growth scan: __________________________________________

## 2021-02-09 NOTE — PATIENT INSTRUCTIONS
Kick Counts in Pregnancy   AMBULATORY CARE:   Kick counts  measure how much your baby is moving in your womb  A kick from your baby can be felt as a twist, turn, swish, roll, or jab  It is common to feel your baby kicking at 26 to 28 weeks of pregnancy  You may feel your baby kick as early as 20 weeks of pregnancy  You may want to start counting at 28 weeks  Contact your healthcare provider immediately if:   · You feel a change in the number of kicks or movements of your baby  · You feel fewer than 10 kicks within 2 hours  · You have questions or concerns about your baby's movements  Why measure kick counts:  Your baby's movement may provide information about your baby's health  He or she may move less, or not at all, if there are problems  Your baby may move less if he or she is not getting enough oxygen or nutrition from the placenta  Do not smoke while you are pregnant  Smoking decreases the amount of oxygen that gets to your baby  Talk to your healthcare provider if you need help to quit smoking  Tell your healthcare provider as soon as you feel a change in your baby's movements  When to measure kick counts:   · Measure kick counts at the same time every day  · Measure kick counts when your baby is awake and most active  Your baby may be most active in the evening  How to measure kick counts:  Check that your baby is awake before you measure kick counts  You can wake up your baby by lightly pushing on your belly, walking, or drinking something cold  Your healthcare provider may tell you different ways to measure kick counts  You may be told to do the following:  · Use a chart or clock to keep track of the time you start and finish counting  · Sit in a chair or lie on your left side  · Place your hands on the largest part of your belly  · Count until you reach 10 kicks  Write down how much time it takes to count 10 kicks  · It may take 30 minutes to 2 hours to count 10 kicks  It should not take more than 2 hours to count 10 kicks  Follow up with your healthcare provider as directed:  Write down your questions so you remember to ask them during your visits  © Copyright [address] Information is for End User's use only and may not be sold, redistributed or otherwise used for commercial purposes  All illustrations and images included in CareNotes® are the copyrighted property of A D A M , Inc  or [facility] Meera Ma   The above information is an  only  It is not intended as medical advice for individual conditions or treatments  Talk to your doctor, nurse or pharmacist before following any medical regimen to see if it is safe and effective for you

## 2021-02-09 NOTE — PROGRESS NOTES
Please refer to the [facility] ultrasound report in Ob Procedures for additional information regarding today's visit

## 2021-02-10 ENCOUNTER — TELEPHONE (OUTPATIENT)
Dept: PERINATAL CARE | Facility: CLINIC | Age: 36
End: 2021-02-10

## 2021-02-10 NOTE — PROGRESS NOTES
Berta Gomez presents today for routine OB visit at 36w1d  Blood Pressure: 132/86  Zp=316 kg (259 lb 12 8 oz); Body mass index is 41 93 kg/m² ; TWG=13 1 kg (28 lb 12 8 oz)  Fetal Heart Rate: 152; Fundal Height (cm): 36 cm  Abdomen: gravid, soft, non-tender  Denies uterine contractions  Denies vaginal bleeding or leaking of fluid  Reports adequate fetal movement of at least 10 movements in 2 hours once daily  AMA-LDASA 162 mg daily   28 week labs- 1 hour GTT was 123, O positive blood type   BMI >40- APFS   last ultrasound 01/12/2021,  vertex  Scheduled for ultrasound  Is indicated   vaccinations- has had Tdap and flu vaccine   GBS done today- no  penicillin allergies  plans on breast-feeding    Perineal massage- has information   contraception- OCPs  Reviewed premature labor precautions, preeclampsia precautions and fetal kick counts  Advised to continue medications and return in 1 weeks  Current Outpatient Medications   Medication Instructions    aspirin (ECOTRIN LOW STRENGTH) 81 mg EC tablet TAKE 2 TABLETS (162 MG TOTAL) BY MOUTH DAILY    Prenatal Vit-Fe Fumarate-FA (PRENATAL 1+1 PO) Oral         Pregnancy Problems (from 06/02/20 to present)     No problems associated with this episode

## 2021-02-11 ENCOUNTER — ROUTINE PRENATAL (OUTPATIENT)
Dept: OBGYN CLINIC | Facility: CLINIC | Age: 36
End: 2021-02-11

## 2021-02-11 VITALS
DIASTOLIC BLOOD PRESSURE: 86 MMHG | BODY MASS INDEX: 41.93 KG/M2 | SYSTOLIC BLOOD PRESSURE: 132 MMHG | RESPIRATION RATE: 16 BRPM | WEIGHT: 259.8 LBS | HEART RATE: 100 BPM

## 2021-02-11 DIAGNOSIS — Z3A.36 36 WEEKS GESTATION OF PREGNANCY: ICD-10-CM

## 2021-02-11 DIAGNOSIS — O99.213 MATERNAL MORBID OBESITY IN THIRD TRIMESTER, ANTEPARTUM (HCC): Primary | ICD-10-CM

## 2021-02-11 DIAGNOSIS — E66.01 MATERNAL MORBID OBESITY IN THIRD TRIMESTER, ANTEPARTUM (HCC): Primary | ICD-10-CM

## 2021-02-11 PROCEDURE — 87150 DNA/RNA AMPLIFIED PROBE: CPT | Performed by: NURSE PRACTITIONER

## 2021-02-11 PROCEDURE — 99213 OFFICE O/P EST LOW 20 MIN: CPT | Performed by: NURSE PRACTITIONER

## 2021-02-11 NOTE — PATIENT INSTRUCTIONS
Covid - 19 instructions    If you are having any of the following     Cough   Shortness of breath   Fever  If traveled internationally or to high risk [place] states  Or been in contact with someone that has     Please call:    [phone]  option 7    They will screen you and direct you to the nearest testing location     Should not come to the PCP or OB office without calling that number first        Fetal Movement   WHAT YOU NEED TO KNOW:   Fetal movements are the kicks, rolls, and hiccups of your unborn baby  You may start to feel these movements when you are 20 weeks pregnant  The movements grow stronger and more frequent as your baby grows  Fetal movements show that your unborn baby is getting the oxygen and nutrients he needs before birth  Fewer fetal movements may signal a problem with your baby's health  DISCHARGE INSTRUCTIONS:   Follow up with your healthcare provider or obstetrician as directed:  Write down your questions so you remember to ask them during your visits  Normal fetal movement:  Fetal activity can be described by 4 states, from least to most active  During quiet sleep, your unborn baby may be still for up to 2 hours  During active sleep, he kicks, rolls, and moves often  During the quiet awake state, he may only move his eyes  The active awake state includes strong kicks and rolls  What affects fetal movement:  You may feel your baby move more after you eat, or after you drink caffeine  You may feel your baby move less while you are more active, such as when you exercise  You may also feel fewer movements if you are obese  Certain medicines can change your baby's movements  Tell your healthcare provider about the medicines you are taking  Track fetal movements at home:  Fetal movement is most often felt when you lie quietly on your side  Your healthcare provider may ask you to count movements for 2 hours  He may ask you to track how long it takes for your baby to move 10 times   Keep a log of your baby's movements  Contact your healthcare provider or obstetrician if:   · It takes longer than usual to feel 10 of your unborn baby's movements  · You do not feel your unborn baby move at least 10 times in 2 hours  · The skin on your hands, feet, and around your eyes is more swollen than usual      · You have a headache for at least 24 hours  · Tiny red dots appear on your skin  · Your belly is tender when you press on it  · You have questions or concerns about your condition or care  Return to the emergency department if:   · You do not feel your unborn baby move for 12 hours  · You feel cramping or constant pain in your abdomen  · You have heavy bleeding from your vagina  · You have a severe headache and cannot see clearly  · You are having trouble breathing or are vomiting  · You have a seizure  © Copyright [address] Information is for End User's use only and may not be sold, redistributed or otherwise used for commercial purposes  All illustrations and images included in CareNotes® are the copyrighted property of A D A M , Inc  or [address] [place] [address]  The above information is an  only  It is not intended as medical advice for individual conditions or treatments  Talk to your doctor, nurse or pharmacist before following any medical regimen to see if it is safe and effective for you  Pregnancy at 28 to 38 Weeks   AMBULATORY CARE:   What changes are happening to your body: You are considered full term at the beginning of 37 weeks  Your breathing may be easier if your baby has moved down into a head-down position  You may need to urinate more often because the baby may be pressing on your bladder  You may also feel more discomfort and get tired easily  Seek care immediately if:   · You develop a severe headache that does not go away  · You have new or increased vision changes, such as blurred or spotted vision      · You have new or increased swelling in your face or hands  · You have vaginal spotting or bleeding  · Your water broke or you feel warm water gushing or trickling from your vagina  Contact your healthcare provider if:   · You have more than 5 contractions in 1 hour  · You notice any changes in your baby's movements  · You have abdominal cramps, pressure, or tightening  · You have a change in vaginal discharge  · You have chills or a fever  · You have vaginal itching, burning, or pain  · You have yellow, green, white, or foul-smelling vaginal discharge  · You have pain or burning when you urinate, less urine than usual, or pink or bloody urine  · You have questions or concerns about your condition or care  How to care for yourself at this stage of your pregnancy:   · Eat a variety of healthy foods  Healthy foods include fruits, vegetables, whole-grain breads, low-fat dairy foods, beans, lean meats, and fish  Drink liquids as directed  Ask how much liquid to drink each day and which liquids are best for you  Limit caffeine to less than 200 milligrams each day  Limit your intake of fish to 2 servings each week  Choose fish low in mercury such as canned light tuna, shrimp, salmon, cod, or tilapia  Do not  eat fish high in mercury such as swordfish, tilefish, ed mackerel, and shark  · Take prenatal vitamins as directed  Your need for certain vitamins and minerals, such as folic acid, increases during pregnancy  Prenatal vitamins provide some of the extra vitamins and minerals you need  Prenatal vitamins may also help to decrease the risk of certain birth defects  · Rest as needed  Put your feet up if you have swelling in your ankles and feet  · Do not smoke  Smoking increases your risk of a miscarriage and other health problems during your pregnancy  Smoking can cause your baby to be born early or weigh less at birth   Ask your healthcare provider for information if you need help quitting  · Do not drink alcohol  Alcohol passes from your body to your baby through the placenta  It can affect your baby's brain development and cause fetal alcohol syndrome (FAS)  FAS is a group of conditions that causes mental, behavior, and growth problems  · Talk to your healthcare provider before you take any medicines  Many medicines may harm your baby if you take them when you are pregnant  Do not take any medicines, vitamins, herbs, or supplements without first talking to your healthcare provider  Never use illegal or street drugs (such as marijuana or cocaine) while you are pregnant  · Talk to your healthcare provider before you travel  You may not be able to travel in an airplane after 36 weeks  He may also recommend that you avoid long road trips  Safety tips during pregnancy:   · Avoid hot tubs and saunas  Do not use a hot tub or sauna while you are pregnant, especially during your first trimester  Hot tubs and saunas may raise your baby's temperature and increase the risk of birth defects  · Avoid toxoplasmosis  This is an infection caused by eating raw meat or being around infected cat feces  It can cause birth defects, miscarriages, and other problems  Wash your hands after you touch raw meat  Make sure any meat is well-cooked before you eat it  Avoid raw eggs and unpasteurized milk  Use gloves or ask someone else to clean your cat's litter box while you are pregnant  · Ask your healthcare provider about travel  The most comfortable time to travel is during the second trimester  Ask your healthcare provider if you can travel after 36 weeks  You may not be able to travel in an airplane after 36 weeks  He may also recommend that you avoid long road trips  Changes that are happening with your baby:  By 38 weeks, your baby may weigh between 6 and 9 pounds  Your baby may be about 14 inches long from the top of the head to the rump (baby's bottom)   Your baby hears well enough to know your voice  As your baby gets larger, you may feel fewer kicks and more stretching and rolling  Your baby may move into a head-down position  Your baby will also rest lower in your abdomen  What you need to know about prenatal care: Your healthcare provider will check your blood pressure and weight  You may also need the following:  · A urine test  may also be done to check for sugar and protein  These can be signs of gestational diabetes or infection  Protein in your urine may also be a sign of preeclampsia  Preeclampsia is a condition that can develop during week 20 or later of your pregnancy  It causes high blood pressure, and it can cause problems with your kidneys and other organs  · A blood test  may be done to check for anemia (low iron level)  · A Tdap vaccine  may be recommended by your healthcare provider  · A group B strep test  is a test that is done to check for group B strep infection  Group B strep is a type of bacteria that may be found in the vagina or rectum  It can be passed to your baby during delivery if you have it  Your healthcare provider will take swab your vagina or rectum and send the sample to the lab for tests  · Fundal height  is a measurement of your uterus to check your baby's growth  This number is usually the same as the number of weeks that you have been pregnant  Your healthcare provider may also check your baby's position  · Your baby's heart rate  will be checked  © Copyright [address] Information is for End User's use only and may not be sold, redistributed or otherwise used for commercial purposes  All illustrations and images included in CareNotes® are the copyrighted property of A D A M , Inc  or [address] [place], [place] [address]   The above information is an  only  It is not intended as medical advice for individual conditions or treatments   Talk to your doctor, nurse or pharmacist before following any medical regimen to see if it is safe and effective for you

## 2021-02-13 LAB — GP B STREP DNA SPEC QL NAA+PROBE: NEGATIVE

## 2021-02-15 ENCOUNTER — TELEPHONE (OUTPATIENT)
Dept: PERINATAL CARE | Facility: CLINIC | Age: 36
End: 2021-02-15

## 2021-02-15 ENCOUNTER — TELEPHONE (OUTPATIENT)
Dept: OBGYN CLINIC | Facility: CLINIC | Age: 36
End: 2021-02-15

## 2021-02-15 NOTE — TELEPHONE ENCOUNTER
----- Message from Pritesh Xavier, [address] sent at 2/15/2021  7:05 AM EST -----  GBS Result is negative, please call patient to inform

## 2021-02-15 NOTE — TELEPHONE ENCOUNTER
-------------------------------------------------------------    Attempted to reach patient by phone and left voicemail to confirm appointment for MFM ultrasound  1 support person ( must be over the age of 15) may accompany you for your appointment  If you or your support person have traveled outside the state in the past 2 weeks, please call and notify our office today #[phone]  You and your support person must wear a mask ,covering nose and mouth,during your entire visit  To minimize your exposure in our waiting room, please call our office prior to entering the building  Check in and rooming questions will be done via phone  We will give you directions when to enter for your appointment  [place]: [phone]    IF you are not feeling well- cough, fever, shortness of breath or any flu like symptoms, contact your primary care physician or [phone]  If you are awaiting COVID 19 test results please call and reschedule your appointment    Any questions with these instructions please call Maternal Fetal Medicine nurse line today @ # [phone]

## 2021-02-16 ENCOUNTER — ULTRASOUND (OUTPATIENT)
Dept: PERINATAL CARE | Facility: OTHER | Age: 36
End: 2021-02-16
Payer: COMMERCIAL

## 2021-02-16 VITALS
BODY MASS INDEX: 42.38 KG/M2 | SYSTOLIC BLOOD PRESSURE: 120 MMHG | HEIGHT: 66 IN | WEIGHT: 263.67 LBS | DIASTOLIC BLOOD PRESSURE: 79 MMHG | HEART RATE: 99 BPM

## 2021-02-16 DIAGNOSIS — Z34.93 THIRD TRIMESTER PREGNANCY: ICD-10-CM

## 2021-02-16 DIAGNOSIS — O99.213 OBESITY IN PREGNANCY, ANTEPARTUM, THIRD TRIMESTER: ICD-10-CM

## 2021-02-16 DIAGNOSIS — U07.1 COVID-19 VIRUS INFECTION: ICD-10-CM

## 2021-02-16 DIAGNOSIS — Z3A.37 37 WEEKS GESTATION OF PREGNANCY: Primary | ICD-10-CM

## 2021-02-16 PROCEDURE — 76815 OB US LIMITED FETUS(S): CPT | Performed by: OBSTETRICS & GYNECOLOGY

## 2021-02-16 PROCEDURE — 59025 FETAL NON-STRESS TEST: CPT | Performed by: OBSTETRICS & GYNECOLOGY

## 2021-02-16 NOTE — PROGRESS NOTES
Ob ultrasound and NST      Fetal ultrasound examination for evaluation of YOKO and NST at 37 0/7 weeks gestation  Hx of  Obesity  + Covid test followed by a negative Covid test the next Day 12 20 and 12 21 2020  She is asymptomatic  See Ob procedures in EPIC  1  YOKO  wnl 10 02  NST  Reactive    Recommendations;    1  Once a week NST, once a week YOKO  2  Daily fetal movement counts  Thank you for referring your patient to our offices  If you have any further questions do not hesitate to contact us as [phone]      Jj Edwards MD

## 2021-02-16 NOTE — PATIENT INSTRUCTIONS
Kick Counts in Pregnancy   AMBULATORY CARE:   Kick counts  measure how much your baby is moving in your womb  A kick from your baby can be felt as a twist, turn, swish, roll, or jab  It is common to feel your baby kicking at 26 to 28 weeks of pregnancy  You may feel your baby kick as early as 20 weeks of pregnancy  You may want to start counting at 28 weeks  Contact your healthcare provider immediately if:   · You feel a change in the number of kicks or movements of your baby  · You feel fewer than 10 kicks within 2 hours  · You have questions or concerns about your baby's movements  Why measure kick counts:  Your baby's movement may provide information about your baby's health  He or she may move less, or not at all, if there are problems  Your baby may move less if he or she is not getting enough oxygen or nutrition from the placenta  Do not smoke while you are pregnant  Smoking decreases the amount of oxygen that gets to your baby  Talk to your healthcare provider if you need help to quit smoking  Tell your healthcare provider as soon as you feel a change in your baby's movements  When to measure kick counts:   · Measure kick counts at the same time every day  · Measure kick counts when your baby is awake and most active  Your baby may be most active in the evening  How to measure kick counts:  Check that your baby is awake before you measure kick counts  You can wake up your baby by lightly pushing on your belly, walking, or drinking something cold  Your healthcare provider may tell you different ways to measure kick counts  You may be told to do the following:  · Use a chart or clock to keep track of the time you start and finish counting  · Sit in a chair or lie on your left side  · Place your hands on the largest part of your belly  · Count until you reach 10 kicks  Write down how much time it takes to count 10 kicks  · It may take 30 minutes to 2 hours to count 10 kicks  It should not take more than 2 hours to count 10 kicks  Follow up with your healthcare provider as directed:  Write down your questions so you remember to ask them during your visits  © Copyright [address] Information is for End User's use only and may not be sold, redistributed or otherwise used for commercial purposes  All illustrations and images included in CareNotes® are the copyrighted property of A D A M , Inc  or [facility] Meera Ma   The above information is an  only  It is not intended as medical advice for individual conditions or treatments  Talk to your doctor, nurse or pharmacist before following any medical regimen to see if it is safe and effective for you

## 2021-02-16 NOTE — LETTER
February 16, 2021     Kane Solis, [address] [address]    Patient: Bernadine Weller   YOB: 1985   Date of Visit: 2/16/2021       Dear Dr Alla Murillo: Thank you for referring Bernadine Weller to me for evaluation  Below are my notes for this consultation  If you have questions, please do not hesitate to call me  I look forward to following your patient along with you  Sincerely,        Wilner Alvarez MD        CC: No Recipients  Wilner Alvarez MD  2/16/2021  3:51 PM  Sign when Signing Visit  Ob ultrasound and NST      Fetal ultrasound examination for evaluation of YOKO and NST at 37 0/7 weeks gestation  Hx of  Obesity  + Covid test followed by a negative Covid test the next Day 12 20 and 12 21 2020  She is asymptomatic  See Ob procedures in EPIC  1  YOKO  wnl 10 02  NST  Reactive    Recommendations;    1  Once a week NST, once a week YOKO  2  Daily fetal movement counts  Thank you for referring your patient to our offices  If you have any further questions do not hesitate to contact us as [phone]      Wilner Alvarez MD

## 2021-02-17 ENCOUNTER — TELEPHONE (OUTPATIENT)
Dept: OBGYN CLINIC | Facility: CLINIC | Age: 36
End: 2021-02-17

## 2021-02-18 ENCOUNTER — ROUTINE PRENATAL (OUTPATIENT)
Dept: OBGYN CLINIC | Facility: CLINIC | Age: 36
End: 2021-02-18

## 2021-02-18 VITALS
SYSTOLIC BLOOD PRESSURE: 124 MMHG | WEIGHT: 262.4 LBS | DIASTOLIC BLOOD PRESSURE: 84 MMHG | HEART RATE: 100 BPM | BODY MASS INDEX: 42.35 KG/M2 | RESPIRATION RATE: 16 BRPM

## 2021-02-18 DIAGNOSIS — Z3A.37 37 WEEKS GESTATION OF PREGNANCY: Primary | ICD-10-CM

## 2021-02-18 PROCEDURE — 99213 OFFICE O/P EST LOW 20 MIN: CPT | Performed by: OBSTETRICS & GYNECOLOGY

## 2021-02-18 NOTE — PROGRESS NOTES
OB/GYN  PN Visit  Berta Gomez  [id number]  2/18/2021  11:08 AM  Johnson Solis MD    S: 38 y o  [id number] 37w2d here for PN visit  Patient with occasional contractions  No LOF or VB  Good FM  O:  Vitals:    02/18/21 1049   BP: 124/84   Pulse: 100   Resp: 16       Gen: no acute distress, nonlabored breathing, pleasant demeanor  OB exam completed: Fundal height 38 cm,  bpm   SVE: 1/60/-3    A/P:  #1  37w2d GESTATION  GBS negative  Desire eIOL at 39 weeks gestation  Labor precautions reviewed  Fetal kick counts reviewed  RTC in 1 weeks    #2   Contraception: Considering options    Future Appointments   Date Time Provider Kirti Raiza   2/25/2021  9:45 AM Johnson Solis MD [facility]         Johnson Solis MD  2/18/2021  11:08 AM

## 2021-02-18 NOTE — PATIENT INSTRUCTIONS
Pregnancy at [address] to [address]:   You are considered full term at the beginning of 37 weeks  Your breathing may be easier if your baby has moved down into a head-down position  You may need to urinate more often because the baby may be pressing on your bladder  You may also feel more discomfort and get tired easily  DISCHARGE INSTRUCTIONS:   Seek care immediately if:   · You develop a severe headache that does not go away  · You have new or increased vision changes, such as blurred or spotted vision  · You have new or increased swelling in your face or hands  · You have vaginal spotting or bleeding  · Your water broke or you feel warm water gushing or trickling from your vagina  Contact your healthcare provider if:   · You have more than 5 contractions in 1 hour  · You notice any changes in your baby's movements  · You have abdominal cramps, pressure, or tightening  · You have a change in vaginal discharge  · You have chills or a fever  · You have vaginal itching, burning, or pain  · You have yellow, green, white, or foul-smelling vaginal discharge  · You have pain or burning when you urinate, less urine than usual, or pink or bloody urine  · You have questions or concerns about your condition or care  How to care for yourself at this stage of your pregnancy:   · Eat a variety of healthy foods  Healthy foods include fruits, vegetables, whole-grain breads, low-fat dairy foods, beans, lean meats, and fish  Drink liquids as directed  Ask how much liquid to drink each day and which liquids are best for you  Limit caffeine to less than 200 milligrams each day  Limit your intake of fish to 2 servings each week  Choose fish low in mercury such as canned light tuna, shrimp, salmon, cod, or tilapia  Do not  eat fish high in mercury such as swordfish, tilefish, ed mackerel, and shark  · Take prenatal vitamins as directed    Your need for certain vitamins and minerals, such as folic acid, increases during pregnancy  Prenatal vitamins provide some of the extra vitamins and minerals you need  Prenatal vitamins may also help to decrease the risk of certain birth defects  · Rest as needed  Put your feet up if you have swelling in your ankles and feet  · Do not smoke  Smoking increases your risk of a miscarriage and other health problems during your pregnancy  Smoking can cause your baby to be born early or weigh less at birth  Ask your healthcare provider for information if you need help quitting  · Do not drink alcohol  Alcohol passes from your body to your baby through the placenta  It can affect your baby's brain development and cause fetal alcohol syndrome (FAS)  FAS is a group of conditions that causes mental, behavior, and growth problems  · Talk to your healthcare provider before you take any medicines  Many medicines may harm your baby if you take them when you are pregnant  Do not take any medicines, vitamins, herbs, or supplements without first talking to your healthcare provider  Never use illegal or street drugs (such as marijuana or cocaine) while you are pregnant  · Talk to your healthcare provider before you travel  You may not be able to travel in an airplane after 36 weeks  He may also recommend that you avoid long road trips  Safety tips:   · Avoid hot tubs and saunas  Do not use a hot tub or sauna while you are pregnant, especially during your first trimester  Hot tubs and saunas may raise your baby's temperature and increase the risk of birth defects  · Avoid toxoplasmosis  This is an infection caused by eating raw meat or being around infected cat feces  It can cause birth defects, miscarriages, and other problems  Wash your hands after you touch raw meat  Make sure any meat is well-cooked before you eat it  Avoid raw eggs and unpasteurized milk   Use gloves or ask someone else to clean your cat's litter box while you are pregnant  · Ask your healthcare provider about travel  The most comfortable time to travel is during the second trimester  Ask your healthcare provider if you can travel after 36 weeks  You may not be able to travel in an airplane after 36 weeks  He may also recommend that you avoid long road trips  Changes that are happening with your baby:  By 38 weeks, your baby may weigh between 6 and 9 pounds  Your baby may be about 14 inches long from the top of the head to the rump (baby's bottom)  Your baby hears well enough to know your voice  As your baby gets larger, you may feel fewer kicks and more stretching and rolling  Your baby may move into a head-down position  Your baby will also rest lower in your abdomen  What you need to know about prenatal care: Your healthcare provider will check your blood pressure and weight  You may also need the following:  · A urine test  may also be done to check for sugar and protein  These can be signs of gestational diabetes or infection  Protein in your urine may also be a sign of preeclampsia  Preeclampsia is a condition that can develop during week 20 or later of your pregnancy  It causes high blood pressure, and it can cause problems with your kidneys and other organs  · A blood test  may be done to check for anemia (low iron level)  · A Tdap vaccine  may be recommended by your healthcare provider  · A group B strep test  is a test that is done to check for group B strep infection  Group B strep is a type of bacteria that may be found in the vagina or rectum  It can be passed to your baby during delivery if you have it  Your healthcare provider will take swab your vagina or rectum and send the sample to the lab for tests  · Fundal height  is a measurement of your uterus to check your baby's growth  This number is usually the same as the number of weeks that you have been pregnant   Your healthcare provider may also check your baby's position  · Your baby's heart rate  will be checked  © Copyright [address] Information is for End User's use only and may not be sold, redistributed or otherwise used for commercial purposes  All illustrations and images included in CareNotes® are the copyrighted property of [employer]  or [employer]   The above information is an  only  It is not intended as medical advice for individual conditions or treatments  Talk to your doctor, nurse or pharmacist before following any medical regimen to see if it is safe and effective for you

## 2021-02-24 NOTE — PROGRESS NOTES
OB/GYN prenatal visit    S: 38 y o  [id number] 38w1d here for PN visit  She has no obstetric complaints, including pelvic pain, contractions, vaginal bleeding, loss of fluid, or decreased fetal movement  O:  Vitals:    02/25/21 1019   BP: 112/76   Pulse: 91       Gen: no acute distress, nonlabored breathing  FHT: 140  FH: 38cm         Problem List Items Addressed This Visit        Other    Third trimester pregnancy - Primary     1  Pre-eclampsia prevention:  mg daily  2  28 week labs- 1 hour GTT was 123, O positive blood type  3  BMI >40- APFS  4  S/p Tdap and flu vaccine  5  GBS negative   6  Plans on breast-feeding  7  Contraception- OCPs  8  IOL: patient desires IOL, will call tomorrow to try to get a spot  Registration did not have any appointments for elective induction when contracted today  Reviewed premature labor precautions                    Florian Bustos MD  2/24/2021  3:49 PM

## 2021-02-25 ENCOUNTER — ROUTINE PRENATAL (OUTPATIENT)
Dept: OBGYN CLINIC | Facility: CLINIC | Age: 36
End: 2021-02-25

## 2021-02-25 VITALS
HEART RATE: 91 BPM | BODY MASS INDEX: 42.91 KG/M2 | SYSTOLIC BLOOD PRESSURE: 112 MMHG | WEIGHT: 267 LBS | HEIGHT: 66 IN | DIASTOLIC BLOOD PRESSURE: 76 MMHG

## 2021-02-25 DIAGNOSIS — Z34.93 THIRD TRIMESTER PREGNANCY: Primary | ICD-10-CM

## 2021-02-25 PROCEDURE — 3008F BODY MASS INDEX DOCD: CPT | Performed by: OBSTETRICS & GYNECOLOGY

## 2021-02-25 PROCEDURE — 99213 OFFICE O/P EST LOW 20 MIN: CPT | Performed by: OBSTETRICS & GYNECOLOGY

## 2021-02-25 NOTE — ASSESSMENT & PLAN NOTE
1  Pre-eclampsia prevention:  mg daily  2  28 week labs- 1 hour GTT was 123, O positive blood type  3  BMI >40- APFS  4  S/p Tdap and flu vaccine  5  GBS negative   6  Plans on breast-feeding  7  Contraception- OCPs  8  IOL: patient desires IOL, will call tomorrow to try to get a spot  Registration did not have any appointments for elective induction when contracted today  Reviewed premature labor precautions

## 2021-02-26 ENCOUNTER — TELEPHONE (OUTPATIENT)
Dept: OTHER | Facility: OTHER | Age: 36
End: 2021-02-26

## 2021-03-01 ENCOUNTER — TELEPHONE (OUTPATIENT)
Dept: OBGYN CLINIC | Facility: CLINIC | Age: 36
End: 2021-03-01

## 2021-03-01 NOTE — TELEPHONE ENCOUNTER
Attempt to notify patient that induction has been scheduled for 3/2 at 7am   Message left for patient to call office

## 2021-03-02 ENCOUNTER — HOSPITAL ENCOUNTER (OUTPATIENT)
Dept: LABOR AND DELIVERY | Facility: HOSPITAL | Age: 36
Discharge: HOME | DRG: 560 | End: 2021-03-02
Payer: COMMERCIAL

## 2021-03-02 ENCOUNTER — HOSPITAL ENCOUNTER (INPATIENT)
Facility: HOSPITAL | Age: 36
LOS: 2 days | Discharge: HOME | DRG: 560 | End: 2021-03-04
Attending: OBSTETRICS & GYNECOLOGY | Admitting: OBSTETRICS & GYNECOLOGY
Payer: COMMERCIAL

## 2021-03-02 ENCOUNTER — ANESTHESIA (INPATIENT)
Dept: ANESTHESIOLOGY | Facility: HOSPITAL | Age: 36
DRG: 560 | End: 2021-03-02
Payer: COMMERCIAL

## 2021-03-02 ENCOUNTER — ANESTHESIA EVENT (INPATIENT)
Dept: ANESTHESIOLOGY | Facility: HOSPITAL | Age: 36
DRG: 560 | End: 2021-03-02
Payer: COMMERCIAL

## 2021-03-02 DIAGNOSIS — Z3A.39 39 WEEKS GESTATION OF PREGNANCY: ICD-10-CM

## 2021-03-02 LAB
ABO GROUP BLD: NORMAL
BASE EXCESS BLDCOA CALC-SCNC: -3.3 MMOL/L (ref 3–11)
BASE EXCESS BLDCOV CALC-SCNC: -0.4 MMOL/L (ref 1–9)
BASOPHILS # BLD AUTO: 0.03 THOUSANDS/ΜL (ref 0–0.1)
BASOPHILS NFR BLD AUTO: 0 % (ref 0–1)
BLD GP AB SCN SERPL QL: NEGATIVE
EOSINOPHIL # BLD AUTO: 0.07 THOUSAND/ΜL (ref 0–0.61)
EOSINOPHIL NFR BLD AUTO: 1 % (ref 0–6)
ERYTHROCYTE [DISTWIDTH] IN BLOOD BY AUTOMATED COUNT: 13.3 % (ref 11.6–15.1)
HCO3 BLDCOA-SCNC: 26.3 MMOL/L (ref 17.3–27.3)
HCO3 BLDCOV-SCNC: 25.4 MMOL/L (ref 12.2–28.6)
HCT VFR BLD AUTO: 35.9 % (ref 34.8–46.1)
HGB BLD-MCNC: 12 G/DL (ref 11.5–15.4)
HOLD SPECIMEN: NORMAL
IMM GRANULOCYTES # BLD AUTO: 0.1 THOUSAND/UL (ref 0–0.2)
IMM GRANULOCYTES NFR BLD AUTO: 1 % (ref 0–2)
LYMPHOCYTES # BLD AUTO: 1.52 THOUSANDS/ΜL (ref 0.6–4.47)
LYMPHOCYTES NFR BLD AUTO: 16 % (ref 14–44)
MCH RBC QN AUTO: 31 PG (ref 26.8–34.3)
MCHC RBC AUTO-ENTMCNC: 33.4 G/DL (ref 31.4–37.4)
MCV RBC AUTO: 93 FL (ref 82–98)
MONOCYTES # BLD AUTO: 0.88 THOUSAND/ΜL (ref 0.17–1.22)
MONOCYTES NFR BLD AUTO: 9 % (ref 4–12)
NEUTROPHILS # BLD AUTO: 6.77 THOUSANDS/ΜL (ref 1.85–7.62)
NEUTS SEG NFR BLD AUTO: 73 % (ref 43–75)
NRBC BLD AUTO-RTO: 0 /100 WBCS
O2 CT VFR BLDCOA CALC: 13.1 ML/DL
OXYHGB MFR BLDCOA: 57.7 %
OXYHGB MFR BLDCOV: 81.4 %
PCO2 BLDCOA: 67.2 MM[HG] (ref 30–60)
PCO2 BLDCOV: 45.6 MM HG (ref 27–43)
PH BLDCOA: 7.21 [PH] (ref 7.23–7.43)
PH BLDCOV: 7.36 [PH] (ref 7.19–7.49)
PLATELET # BLD AUTO: 224 THOUSANDS/UL (ref 149–390)
PMV BLD AUTO: 9.9 FL (ref 8.9–12.7)
PO2 BLDCOA: 27.5 MM HG (ref 5–25)
PO2 BLDCOV: 35 MM HG (ref 15–45)
RBC # BLD AUTO: 3.87 MILLION/UL (ref 3.81–5.12)
RH BLD: POSITIVE
RPR SER QL: NORMAL
SAO2 % BLDCOV: 17.1 ML/DL
SPECIMEN EXPIRATION DATE: NORMAL
WBC # BLD AUTO: 9.37 THOUSAND/UL (ref 4.31–10.16)

## 2021-03-02 PROCEDURE — 86592 SYPHILIS TEST NON-TREP QUAL: CPT | Performed by: OBSTETRICS & GYNECOLOGY

## 2021-03-02 PROCEDURE — 86900 BLOOD TYPING SEROLOGIC ABO: CPT | Performed by: OBSTETRICS & GYNECOLOGY

## 2021-03-02 PROCEDURE — 10907ZC DRAINAGE OF AMNIOTIC FLUID, THERAPEUTIC FROM PRODUCTS OF CONCEPTION, VIA NATURAL OR ARTIFICIAL OPENING: ICD-10-PCS | Performed by: OBSTETRICS & GYNECOLOGY

## 2021-03-02 PROCEDURE — NC001 PR NO CHARGE: Performed by: OBSTETRICS & GYNECOLOGY

## 2021-03-02 PROCEDURE — 85025 COMPLETE CBC W/AUTO DIFF WBC: CPT | Performed by: OBSTETRICS & GYNECOLOGY

## 2021-03-02 PROCEDURE — 86901 BLOOD TYPING SEROLOGIC RH(D): CPT | Performed by: OBSTETRICS & GYNECOLOGY

## 2021-03-02 PROCEDURE — 3E033VJ INTRODUCTION OF OTHER HORMONE INTO PERIPHERAL VEIN, PERCUTANEOUS APPROACH: ICD-10-PCS | Performed by: OBSTETRICS & GYNECOLOGY

## 2021-03-02 PROCEDURE — 86850 RBC ANTIBODY SCREEN: CPT | Performed by: OBSTETRICS & GYNECOLOGY

## 2021-03-02 PROCEDURE — 82805 BLOOD GASES W/O2 SATURATION: CPT | Performed by: OBSTETRICS & GYNECOLOGY

## 2021-03-02 PROCEDURE — 4A1HXCZ MONITORING OF PRODUCTS OF CONCEPTION, CARDIAC RATE, EXTERNAL APPROACH: ICD-10-PCS | Performed by: OBSTETRICS & GYNECOLOGY

## 2021-03-02 RX ORDER — ONDANSETRON 2 MG/ML
4 INJECTION INTRAMUSCULAR; INTRAVENOUS EVERY 8 HOURS PRN
Status: DISCONTINUED | OUTPATIENT
Start: 2021-03-02 | End: 2021-03-02

## 2021-03-02 RX ORDER — DIPHENHYDRAMINE HYDROCHLORIDE 50 MG/ML
25 INJECTION INTRAMUSCULAR; INTRAVENOUS EVERY 6 HOURS PRN
Status: DISCONTINUED | OUTPATIENT
Start: 2021-03-02 | End: 2021-03-04 | Stop reason: HOSPADM

## 2021-03-02 RX ORDER — BUPIVACAINE HYDROCHLORIDE 2.5 MG/ML
INJECTION, SOLUTION EPIDURAL; INFILTRATION; INTRACAUDAL AS NEEDED
Status: DISCONTINUED | OUTPATIENT
Start: 2021-03-02 | End: 2021-03-03 | Stop reason: HOSPADM

## 2021-03-02 RX ORDER — ONDANSETRON 2 MG/ML
4 INJECTION INTRAMUSCULAR; INTRAVENOUS EVERY 8 HOURS PRN
Status: DISCONTINUED | OUTPATIENT
Start: 2021-03-02 | End: 2021-03-04 | Stop reason: HOSPADM

## 2021-03-02 RX ORDER — LIDOCAINE HYDROCHLORIDE AND EPINEPHRINE 15; 5 MG/ML; UG/ML
INJECTION, SOLUTION EPIDURAL
Status: COMPLETED | OUTPATIENT
Start: 2021-03-02 | End: 2021-03-02

## 2021-03-02 RX ORDER — OXYTOCIN/RINGER'S LACTATE 30/500 ML
250 PLASTIC BAG, INJECTION (ML) INTRAVENOUS CONTINUOUS
Status: ACTIVE | OUTPATIENT
Start: 2021-03-02 | End: 2021-03-02

## 2021-03-02 RX ORDER — SODIUM CHLORIDE, SODIUM LACTATE, POTASSIUM CHLORIDE, CALCIUM CHLORIDE 600; 310; 30; 20 MG/100ML; MG/100ML; MG/100ML; MG/100ML
125 INJECTION, SOLUTION INTRAVENOUS CONTINUOUS
Status: DISCONTINUED | OUTPATIENT
Start: 2021-03-02 | End: 2021-03-02

## 2021-03-02 RX ORDER — DIAPER,BRIEF,INFANT-TODD,DISP
1 EACH MISCELLANEOUS 4 TIMES DAILY PRN
Status: DISCONTINUED | OUTPATIENT
Start: 2021-03-02 | End: 2021-03-04 | Stop reason: HOSPADM

## 2021-03-02 RX ORDER — ACETAMINOPHEN 325 MG/1
650 TABLET ORAL EVERY 4 HOURS PRN
Status: DISCONTINUED | OUTPATIENT
Start: 2021-03-02 | End: 2021-03-02

## 2021-03-02 RX ORDER — DOCUSATE SODIUM 100 MG/1
100 CAPSULE, LIQUID FILLED ORAL 2 TIMES DAILY
Status: DISCONTINUED | OUTPATIENT
Start: 2021-03-02 | End: 2021-03-04 | Stop reason: HOSPADM

## 2021-03-02 RX ORDER — ACETAMINOPHEN 325 MG/1
650 TABLET ORAL EVERY 4 HOURS PRN
Status: DISCONTINUED | OUTPATIENT
Start: 2021-03-02 | End: 2021-03-04

## 2021-03-02 RX ORDER — CALCIUM CARBONATE 200(500)MG
1000 TABLET,CHEWABLE ORAL DAILY PRN
Status: DISCONTINUED | OUTPATIENT
Start: 2021-03-02 | End: 2021-03-04 | Stop reason: HOSPADM

## 2021-03-02 RX ORDER — OXYTOCIN/RINGER'S LACTATE 30/500 ML
1-30 PLASTIC BAG, INJECTION (ML) INTRAVENOUS
Status: DISCONTINUED | OUTPATIENT
Start: 2021-03-02 | End: 2021-03-02

## 2021-03-02 RX ORDER — SENNOSIDES 8.6 MG
1 TABLET ORAL DAILY
Status: DISCONTINUED | OUTPATIENT
Start: 2021-03-03 | End: 2021-03-04 | Stop reason: HOSPADM

## 2021-03-02 RX ORDER — IBUPROFEN 600 MG/1
600 TABLET ORAL EVERY 6 HOURS PRN
Status: DISCONTINUED | OUTPATIENT
Start: 2021-03-02 | End: 2021-03-04 | Stop reason: HOSPADM

## 2021-03-02 RX ORDER — CALCIUM CARBONATE 200(500)MG
1000 TABLET,CHEWABLE ORAL DAILY PRN
Status: DISCONTINUED | OUTPATIENT
Start: 2021-03-02 | End: 2021-03-02

## 2021-03-02 RX ORDER — LIDOCAINE HYDROCHLORIDE AND EPINEPHRINE 15; 5 MG/ML; UG/ML
INJECTION, SOLUTION EPIDURAL AS NEEDED
Status: DISCONTINUED | OUTPATIENT
Start: 2021-03-02 | End: 2021-03-03 | Stop reason: HOSPADM

## 2021-03-02 RX ADMIN — ACETAMINOPHEN 650 MG: 325 TABLET, FILM COATED ORAL at 22:20

## 2021-03-02 RX ADMIN — LIDOCAINE HYDROCHLORIDE AND EPINEPHRINE 3 ML: 15; 5 INJECTION, SOLUTION EPIDURAL at 15:52

## 2021-03-02 RX ADMIN — SODIUM CHLORIDE, SODIUM LACTATE, POTASSIUM CHLORIDE, AND CALCIUM CHLORIDE 125 ML/HR: .6; .31; .03; .02 INJECTION, SOLUTION INTRAVENOUS at 11:10

## 2021-03-02 RX ADMIN — LIDOCAINE HYDROCHLORIDE AND EPINEPHRINE 3 ML: 15; 5 INJECTION, SOLUTION EPIDURAL at 14:35

## 2021-03-02 RX ADMIN — ONDANSETRON 4 MG: 2 INJECTION INTRAMUSCULAR; INTRAVENOUS at 15:49

## 2021-03-02 RX ADMIN — Medication 250 MILLI-UNITS/MIN: at 20:44

## 2021-03-02 RX ADMIN — SODIUM CHLORIDE, SODIUM LACTATE, POTASSIUM CHLORIDE, AND CALCIUM CHLORIDE 125 ML/HR: .6; .31; .03; .02 INJECTION, SOLUTION INTRAVENOUS at 15:01

## 2021-03-02 RX ADMIN — BENZOCAINE AND LEVOMENTHOL: 200; 5 SPRAY TOPICAL at 22:18

## 2021-03-02 RX ADMIN — ROPIVACAINE HYDROCHLORIDE: 2 INJECTION, SOLUTION EPIDURAL; INFILTRATION at 15:00

## 2021-03-02 RX ADMIN — WITCH HAZEL 1 PAD: 500 SOLUTION RECTAL; TOPICAL at 22:25

## 2021-03-02 RX ADMIN — BUPIVACAINE HYDROCHLORIDE 5 ML: 2.5 INJECTION, SOLUTION EPIDURAL; INFILTRATION; INTRACAUDAL at 19:23

## 2021-03-02 RX ADMIN — Medication 2 MILLI-UNITS/MIN: at 08:48

## 2021-03-02 RX ADMIN — SODIUM CHLORIDE, SODIUM LACTATE, POTASSIUM CHLORIDE, AND CALCIUM CHLORIDE 125 ML/HR: .6; .31; .03; .02 INJECTION, SOLUTION INTRAVENOUS at 08:21

## 2021-03-02 NOTE — ANESTHESIA PREPROCEDURE EVALUATION
Procedure:  LABOR ANALGESIA    Relevant Problems   GYN   (+) 37 weeks gestation of pregnancy   (+) 39 weeks gestation of pregnancy        Physical Exam    Airway    Mallampati score: III         Dental   No notable dental hx     Cardiovascular  Rhythm: regular, Cardiovascular exam normal    Pulmonary  Pulmonary exam normal     Other Findings        Anesthesia Plan  ASA Score- 2     Anesthesia Type- epidural with ASA Monitors  Additional Monitors:   Airway Plan:           Plan Factors-Exercise tolerance (METS): >4 METS  Chart reviewed  Existing labs reviewed  Induction-     Postoperative Plan-     Informed Consent- Anesthetic plan and risks discussed with patient  I personally reviewed this patient with the CRNA  Discussed and agreed on the Anesthesia Plan with the CRNA  Hina Ventura

## 2021-03-02 NOTE — DISCHARGE SUMMARY
Discharge Summary - OB/GYN   Jono Hooker 38 y o  female MRN: [id number]  Unit/Bed#: -01 Encounter: [id number]      Admission Date: 3/2/2021     Discharge Date: 3/4/2021    Admitting Diagnosis:   1  Pregnancy at 39w0d  2  Obesity  3  PCOS    Discharge Diagnosis:   Same, delivered    Procedures: spontaneous vaginal delivery    Attending: Keshia Rabago MD    Delivering Attending: Dr Bennett Petty    Discharging Attending: Keshia Rabago MD    Hospital Course:     Jono Hooker is a 38 y o  Jackie Cristian at 39w0d wks who was initially admitted for IOL  She was induced with Pitocin and augmented with AROM  She progressed to complete cervical dilation and began pushing  She delivered a viable male  on 3/2/2021 at 2040  Weight 9lbs 0 8oz via spontaneous vaginal delivery  Apgars were 9 (1 min) and 9 (5 min)   was transferred to  nursery  Patient tolerated the procedure well and was transferred to recovery in stable condition  Her post-partum course was uncomplicated  Her post-partum pain was well controlled with oral analgesics  On day of discharge, she was ambulating and able to reasonably perform all ADLs  She was voiding and had appropriate bowel function  Pain was well controlled  She was discharged home on post-partum day #2 without complications  Patient was instructed to follow up with her OB as an outpatient and was given appropriate warnings to call provider if she develops signs of infection or uncontrolled pain  Complications: none apparent    Condition at discharge: good     Discharge instructions/Information to patient and family:   See after visit summary for information provided to patient and family  Provisions for Follow-Up Care:  See after visit summary for information related to follow-up care and any pertinent home health orders  Disposition: Home    Planned Readmission: No    Discharge Medications:   For a complete list of the patient's medications, please refer to her med rec        Raven Dubose MD  03/04/21

## 2021-03-02 NOTE — OB LABOR/OXYTOCIN SAFETY PROGRESS
Oxytocin Safety Progress Check Note - Bernadine Weller 38 y o  female MRN: [id number]    Unit/Bed#: -01 Encounter: [id number]    Dose (moy-units/min) Oxytocin: 16 moy-units/min  Contraction Frequency (minutes): 2-3  Contraction Quality: Mild  Tachysystole: No   Cervical Dilation: 6        Cervical Effacement: 70  Fetal Station: -2  Baseline Rate: 140 bpm  Fetal Heart Rate: 140 BPM  FHR Category: Category I               Vital Signs:   Vitals:    03/02/21 1529   BP: 134/67   Pulse: 100   Resp:    Temp:    SpO2:            Notes/comments: Pt comfortable with epidural  AROM for clear fluid  SVE as above  Will continue to monitor  FHT cat 1  Dr José Miguel Woo aware          Carl Robles MD 3/2/2021 3:48 PM

## 2021-03-02 NOTE — ANESTHESIA PROCEDURE NOTES
Epidural Block    Start time: 3/2/2021 2:33 PM  Reason for block: at surgeon's request and primary anesthetic  Staffing  Anesthesiologist: Arturo Zimmer MD  Performed: anesthesiologist   Preanesthetic Checklist  Completed: patient identified, site marked, surgical consent, pre-op evaluation, timeout performed, IV checked, risks and benefits discussed and monitors and equipment checked  Epidural  Patient position: sitting  Prep: ChloraPrep  Patient monitoring: heart rate, cardiac monitor, continuous pulse ox and frequent blood pressure checks  Approach: right paramedian  Location: lumbar (1-5)  Injection technique: YUDELKA air  Needle  Needle gauge: 18 G  Catheter type: side hole  Catheter size: 18 G  Catheter at skin depth: 13 cm  Test dose: negativelidocaine 1 5% with epinephrine 1:200,000 test dose, 3 mL  Assessment  Sensory level: [id number] aspiration for CSF, negative aspiration for heme and no paresthesia on injection  patient tolerated the procedure well with no immediate complications

## 2021-03-02 NOTE — PLAN OF CARE
Problem: Knowledge Deficit  Goal: Verbalizes understanding of labor plan  Description: Assess patient/family/caregiver's baseline knowledge level and ability to understand information  Provide education via patient/family/caregiver's preferred learning method at appropriate level of understanding  1  Provide teaching at level of understanding  2  Provide teaching via preferred learning method(s)  Outcome: Progressing  Goal: Patient/family/caregiver demonstrates understanding of disease process, treatment plan, medications, and discharge instructions  Description: Complete learning assessment and assess knowledge base  Interventions:  - Provide teaching at level of understanding  - Provide teaching via preferred learning methods  Outcome: Progressing     Problem: Labor & Delivery  Goal: Manages discomfort  Description: Assess and monitor for signs and symptoms of discomfort  Assess patient's pain level regularly and per hospital policy  Administer medications as ordered  Support use of nonpharmacological methods to help control pain such as distraction, imagery, relaxation, and application of heat and cold  Collaborate with interdisciplinary team and patient to determine appropriate pain management plan  1  Include patient in decisions related to comfort  2  Offer non-pharmacological pain management interventions  3  Report ineffective pain management to physician  Outcome: Progressing  Goal: Patient vital signs are stable  Description: 1  Assess vital signs - vaginal delivery    Outcome: Progressing     Problem: PAIN - ADULT  Goal: Verbalizes/displays adequate comfort level or baseline comfort level  Description: Interventions:  - Encourage patient to monitor pain and request assistance  - Assess pain using appropriate pain scale  - Administer analgesics based on type and severity of pain and evaluate response  - Implement non-pharmacological measures as appropriate and evaluate response  - Consider cultural and social influences on pain and pain management  - Notify physician/advanced practitioner if interventions unsuccessful or patient reports new pain  Outcome: Progressing     Problem: INFECTION - ADULT  Goal: Absence or prevention of progression during hospitalization  Description: INTERVENTIONS:  - Assess and monitor for signs and symptoms of infection  - Monitor lab/diagnostic results  - Monitor all insertion sites, i e  indwelling lines, tubes, and drains  - Monitor endotracheal if appropriate and nasal secretions for changes in amount and color  - [place] appropriate cooling/warming therapies per order  - Administer medications as ordered  - Instruct and encourage patient and family to use good hand hygiene technique  - Identify and instruct in appropriate isolation precautions for identified infection/condition  Outcome: Progressing  Goal: Absence of fever/infection during neutropenic period  Description: INTERVENTIONS:  - Monitor WBC    Outcome: Progressing     Problem: SAFETY ADULT  Goal: Patient will remain free of falls  Description: INTERVENTIONS:  - Assess patient frequently for physical needs  -  Identify cognitive and physical deficits and behaviors that affect risk of falls    -  [place] fall precautions as indicated by assessment   - Educate patient/family on patient safety including physical limitations  - Instruct patient to call for assistance with activity based on assessment  - Modify environment to reduce risk of injury  - Consider OT/PT consult to assist with strengthening/mobility  Outcome: Progressing  Goal: Maintain or return to baseline ADL function  Description: INTERVENTIONS:  -  Assess patient's ability to carry out ADLs; assess patient's baseline for ADL function and identify physical deficits which impact ability to perform ADLs (bathing, care of mouth/teeth, toileting, grooming, dressing, etc )  - Assess/evaluate cause of self-care deficits   - Assess range of motion  - Assess patient's mobility; develop plan if impaired  - Assess patient's need for assistive devices and provide as appropriate  - Encourage maximum independence but intervene and supervise when necessary  - Involve family in performance of ADLs  - Assess for home care needs following discharge   - Consider OT consult to assist with ADL evaluation and planning for discharge  - Provide patient education as appropriate  Outcome: Progressing  Goal: Maintain or return mobility status to optimal level  Description: INTERVENTIONS:  - Assess patient's baseline mobility status (ambulation, transfers, stairs, etc )    - Identify cognitive and physical deficits and behaviors that affect mobility  - Identify mobility aids required to assist with transfers and/or ambulation (gait belt, sit-to-stand, lift, walker, cane, etc )  - [place] fall precautions as indicated by assessment  - Record patient progress and toleration of activity level on Mobility SBAR; progress patient to next Phase/Stage  - Instruct patient to call for assistance with activity based on assessment  - Consider rehabilitation consult to assist with strengthening/weightbearing, etc   Outcome: Progressing

## 2021-03-02 NOTE — OB LABOR/OXYTOCIN SAFETY PROGRESS
Oxytocin Safety Progress Check Note - Daron Lynne 38 y o  female MRN: [id number]    Unit/Bed#: -01 Encounter: [id number]    Dose (moy-units/min) Oxytocin: 14 moy-units/min  Contraction Frequency (minutes): (contractions dificult to trace)  Contraction Quality: Mild  Tachysystole: No   Cervical Dilation: 5        Cervical Effacement: 70  Fetal Station: -3  Baseline Rate: 140 bpm  Fetal Heart Rate: 140 BPM  FHR Category: Category I               Vital Signs:   Vitals:    03/02/21 1350   BP: 114/58   Pulse: 83   Resp:    Temp:            Notes/comments: SVE as above  Patient becoming more uncomfortable  Would like epidural  FHT cat 1          Royal Morris MD 3/2/2021 2:08 PM

## 2021-03-02 NOTE — H&P
H & P- Obstetrics   Bernadine Weller 38 y o  female MRN: [id number]  Unit/Bed#: LD  Encounter: [id number]    Bernadine Weller is a patient of SORA[facility]    SUBJECTIVE:    Chief Complaint: IOL    HPI: Bernadine Weller is a 38 y o  [id number] with an ARLYN of 3/9/2021, by Ultrasound at 39w0d who is being admitted for IOL  She denies having uterine contractions, has no LOF, and reports no VB  She states she has felt good [facility] Patient Active Problem List   Diagnosis    PCOS (polycystic ovarian syndrome)    Possible exposure to STD    Third trimester pregnancy    Close exposure to COVID-19 virus    COVID-19 virus infection    Obesity in pregnancy, antepartum, third trimester    Maternal morbid obesity in third trimester, antepartum ([facility] [address] )    37 weeks gestation of pregnancy       Baby complications/comments: none, EFW 8-8 5%    Review of Systems   Constitutional: Negative for chills and fever  HENT: Negative for congestion  Eyes: Negative for visual disturbance  Respiratory: Negative for cough and shortness of breath  Cardiovascular: Negative for chest pain and palpitations  Gastrointestinal: Negative for abdominal pain, nausea and vomiting  Genitourinary: Negative for dysuria, vaginal bleeding, vaginal discharge and vaginal pain  Musculoskeletal: Negative for myalgias  Neurological: Negative for headaches         OB History    Para Term  AB Living   3 2 2     2   SAB TAB Ectopic Multiple Live Births           2      # Outcome Date GA Lbr Yuri/2nd Weight Sex Delivery Anes PTL Lv   3 Current            2 Term            1 Term                Past Medical History:   Diagnosis Date    Chlamydia     Varicella        Past Surgical History:   Procedure Laterality Date    VAGINAL DELIVERY      WISDOM TOOTH EXTRACTION         Social History     Tobacco Use    Smoking status: Former Smoker     Packs/day: 0 50     Years: 10 00     Pack years: 5 00     Types: Cigarettes     Quit date:  Years since quittin 1    Smokeless tobacco: Never Used   Substance Use Topics    Alcohol use: Not Currently     Frequency: Monthly or less     Drinks per session: 1 or 2     Binge frequency: Never       No Known Allergies    Medications Prior to Admission   Medication    aspirin (ECOTRIN LOW STRENGTH) 81 mg EC tablet    Prenatal Vit-Fe Fumarate-FA (PRENATAL 1+1 PO)           OBJECTIVE:  Vitals:  Temp:  [98 1 °F (36 7 °C)] 98 1 °F (36 7 °C)  HR:  [93] 93  Resp:  [16] 16  BP: (128)/(82) 128/82  There is no height or weight on file to calculate BMI  Physical Exam:  Physical Exam  Constitutional:       Appearance: Normal appearance  Cardiovascular:      Rate and Rhythm: Normal rate and regular rhythm  Pulmonary:      Effort: No respiratory distress  Breath sounds: Normal breath sounds  No wheezing, rhonchi or rales  Abdominal:      Tenderness: There is no abdominal tenderness  There is no guarding or rebound  Comments: Gravid abdomen    Musculoskeletal:      Right lower leg: No edema  Left lower leg: No edema  Neurological:      Mental Status: She is alert and oriented to person, place, and time  Skin:     General: Skin is dry            SVE: 3/60/-3       FHT:  Baseline Rate: 130 bpm  Variability: Moderate 6-25 bpm  Accelerations: 15 x 15 or greater  Decelerations: None  FHR Category: Category I    TOCO:   Contraction Frequency (minutes): irritability  Contraction Duration (seconds): n/a  Contraction Quality: Not applicable    Lab Results   Component Value Date    WBC 10 02 2021    HGB 11 8 2021    HCT 36 3 2021     2021     Lab Results   Component Value Date    K 3 8 2020     2020    CO2 28 2020    BUN 8 2020    CREATININE 0 77 2020    AST 17 2020    ALT 32 2020       Prenatal Labs: Reviewed      Blood type: O+  Antibody: negative  Group B strep: negative  HIV: negative  Hepatitis B: negative  RPR: non-reactive  Rubella: Immune  1 hour Glucose: 123  Platelets: 971  Hgb: 11 8    Assessment: 38 y o   at 39w0d admitted for IOL  SVE: 3/60/-3  FHT: cat 1  Clinical EFW: 8-8 5 ; Vertex confirmed by US  GBS status: negative   Postpartum contraception plan: OCPs      Plan:   · Admit  · CBC, RPR, Type & Screen  · Analgesia at maternal request  · Pitocin titration and AROM  · Antibiotics not indicated    I consented the patient for induction of labor  We discussed possible routes of induction including cytotec for cervical ripening, navarro balloon for mechanical dilation and pitocin titration  The patient and her partner were informed of the routes of medication administration and mechanism of action of the medications used for induction  Possible risks including tachysystole and FHT abnormalities were also discussed with the patient  We discussed fetal monitoring and the possible need fetal resuscitation efforts including maternal repositioning, fluid administration, decreasing or terminating medications and terbutaline  Patient has history of shoulder dystocia in G1 with a VAVD  Baby did not have an sequelae from that delivery  I discussed with patient and her partner the risk of shoulder dystocia recurring at 10%  We discussed the signs at delivery of possible dystocia, maneuvers to help reduce the dystocia and consequences that result for baby including- risk for brachial plexus injury, clavicular or humeral fracture and neurologic sequelae  I informed patient and her partner that if shoulder dystocia is recognized, NICU providers would be called for immediate assessment of the baby  Risks to mom include increased bleeding and laceration  Patient and her partner verbalize understanding and all questions were answered         Dr Cher Arce MD  OB/GYN PGY-1  3/2/2021  8:20 AM

## 2021-03-02 NOTE — OB LABOR/OXYTOCIN SAFETY PROGRESS
Oxytocin Safety Progress Check Note - Nadira Wilcox 38 y o  female MRN: [id number]    Unit/Bed#: -01 Encounter: [id number]    Dose (moy-units/min) Oxytocin: 8 moy-units/min  Contraction Frequency (minutes): difficult to trace ctx-toco adjusted  Contraction Quality: Mild  Tachysystole: No   Cervical Dilation: 4        Cervical Effacement: 70  Fetal Station: -3  Baseline Rate: 130 bpm  Fetal Heart Rate: 136 BPM  FHR Category: Category I               Vital Signs:   Vitals:    03/02/21 1105   BP: 124/72   Pulse: 86   Resp:    Temp:            Notes/comments: SVE as above  Patient making change after 2h on pitocin  Becoming more uncomfortable with contractions  Will assess for AROM at next check  Patient would like to get an epidural prior  FHT cat 1          Michael Andrews MD 3/2/2021 11:21 AM

## 2021-03-03 RX ADMIN — IBUPROFEN 600 MG: 600 TABLET ORAL at 20:50

## 2021-03-03 RX ADMIN — IBUPROFEN 600 MG: 600 TABLET ORAL at 13:37

## 2021-03-03 RX ADMIN — IBUPROFEN 600 MG: 600 TABLET ORAL at 05:25

## 2021-03-03 RX ADMIN — DOCUSATE SODIUM 100 MG: 100 CAPSULE, LIQUID FILLED ORAL at 09:06

## 2021-03-03 RX ADMIN — SENNOSIDES 8.6 MG: 8.6 TABLET, FILM COATED ORAL at 09:06

## 2021-03-03 RX ADMIN — HYDROCORTISONE 1 APPLICATION: 1 CREAM TOPICAL at 13:37

## 2021-03-03 RX ADMIN — DOCUSATE SODIUM 100 MG: 100 CAPSULE, LIQUID FILLED ORAL at 18:02

## 2021-03-03 RX ADMIN — ACETAMINOPHEN 650 MG: 325 TABLET, FILM COATED ORAL at 09:06

## 2021-03-03 NOTE — ANESTHESIA POSTPROCEDURE EVALUATION
Post-Op Assessment Note    CV Status:  Stable  Pain Score: 0    Pain management: adequate     Mental Status:  Alert   Hydration Status:  Stable and euvolemic   PONV Controlled:  None   Airway Patency:  Patent      Post Op Vitals Reviewed: Yes      Staff: CRNA     Post-op block assessment: no complications and catheter intact      No complications documented      /76 (03/02/21 2259)    Temp 98 1 °F (36 7 °C) (03/02/21 2259)    Pulse 99 (03/02/21 2259)   Resp      SpO2

## 2021-03-03 NOTE — PLAN OF CARE
Problem: Knowledge Deficit  Goal: Verbalizes understanding of labor plan  Description: Assess patient/family/caregiver's baseline knowledge level and ability to understand information  Provide education via patient/family/caregiver's preferred learning method at appropriate level of understanding  1  Provide teaching at level of understanding  2  Provide teaching via preferred learning method(s)  Outcome: Progressing  Goal: Patient/family/caregiver demonstrates understanding of disease process, treatment plan, medications, and discharge instructions  Description: Complete learning assessment and assess knowledge base  Interventions:  - Provide teaching at level of understanding  - Provide teaching via preferred learning methods  Outcome: Progressing     Problem: Labor & Delivery  Goal: Manages discomfort  Description: Assess and monitor for signs and symptoms of discomfort  Assess patient's pain level regularly and per hospital policy  Administer medications as ordered  Support use of nonpharmacological methods to help control pain such as distraction, imagery, relaxation, and application of heat and cold  Collaborate with interdisciplinary team and patient to determine appropriate pain management plan  1  Include patient in decisions related to comfort  2  Offer non-pharmacological pain management interventions  3  Report ineffective pain management to physician  Outcome: Progressing  Goal: Patient vital signs are stable  Description: 1  Assess vital signs - vaginal delivery    Outcome: Progressing     Problem: PAIN - ADULT  Goal: Verbalizes/displays adequate comfort level or baseline comfort level  Description: Interventions:  - Encourage patient to monitor pain and request assistance  - Assess pain using appropriate pain scale  - Administer analgesics based on type and severity of pain and evaluate response  - Implement non-pharmacological measures as appropriate and evaluate response  - Consider cultural and social influences on pain and pain management  - Notify physician/advanced practitioner if interventions unsuccessful or patient reports new pain  Outcome: Progressing     Problem: INFECTION - ADULT  Goal: Absence or prevention of progression during hospitalization  Description: INTERVENTIONS:  - Assess and monitor for signs and symptoms of infection  - Monitor lab/diagnostic results  - Monitor all insertion sites, i e  indwelling lines, tubes, and drains  - Monitor endotracheal if appropriate and nasal secretions for changes in amount and color  - [place] appropriate cooling/warming therapies per order  - Administer medications as ordered  - Instruct and encourage patient and family to use good hand hygiene technique  - Identify and instruct in appropriate isolation precautions for identified infection/condition  Outcome: Progressing  Goal: Absence of fever/infection during neutropenic period  Description: INTERVENTIONS:  - Monitor WBC    Outcome: Progressing     Problem: SAFETY ADULT  Goal: Patient will remain free of falls  Description: INTERVENTIONS:  - Assess patient frequently for physical needs  -  Identify cognitive and physical deficits and behaviors that affect risk of falls    -  [place] fall precautions as indicated by assessment   - Educate patient/family on patient safety including physical limitations  - Instruct patient to call for assistance with activity based on assessment  - Modify environment to reduce risk of injury  - Consider OT/PT consult to assist with strengthening/mobility  Outcome: Progressing  Goal: Maintain or return to baseline ADL function  Description: INTERVENTIONS:  -  Assess patient's ability to carry out ADLs; assess patient's baseline for ADL function and identify physical deficits which impact ability to perform ADLs (bathing, care of mouth/teeth, toileting, grooming, dressing, etc )  - Assess/evaluate cause of self-care deficits   - Assess range of motion  - Assess patient's mobility; develop plan if impaired  - Assess patient's need for assistive devices and provide as appropriate  - Encourage maximum independence but intervene and supervise when necessary  - Involve family in performance of ADLs  - Assess for home care needs following discharge   - Consider OT consult to assist with ADL evaluation and planning for discharge  - Provide patient education as appropriate  Outcome: Progressing  Goal: Maintain or return mobility status to optimal level  Description: INTERVENTIONS:  - Assess patient's baseline mobility status (ambulation, transfers, stairs, etc )    - Identify cognitive and physical deficits and behaviors that affect mobility  - Identify mobility aids required to assist with transfers and/or ambulation (gait belt, sit-to-stand, lift, walker, cane, etc )  - [place] fall precautions as indicated by assessment  - Record patient progress and toleration of activity level on Mobility SBAR; progress patient to next Phase/Stage  - Instruct patient to call for assistance with activity based on assessment  - Consider rehabilitation consult to assist with strengthening/weightbearing, etc   Outcome: Progressing     Problem: DISCHARGE PLANNING  Goal: Discharge to home or other facility with appropriate resources  Description: INTERVENTIONS:  - Identify barriers to discharge w/patient and caregiver  - Arrange for needed discharge resources and transportation as appropriate  - Identify discharge learning needs (meds, wound care, etc )  - Arrange for interpretive services to assist at discharge as needed  - Refer to Case Management Department for coordinating discharge planning if the patient needs post-hospital services based on physician/advanced practitioner order or complex needs related to functional status, cognitive ability, or social support system  Outcome: Progressing

## 2021-03-03 NOTE — PLAN OF CARE
Problem: PAIN - ADULT  Goal: Verbalizes/displays adequate comfort level or baseline comfort level  Description: Interventions:  - Encourage patient to monitor pain and request assistance  - Assess pain using appropriate pain scale  - Administer analgesics based on type and severity of pain and evaluate response  - Implement non-pharmacological measures as appropriate and evaluate response  - Consider cultural and social influences on pain and pain management  - Notify physician/advanced practitioner if interventions unsuccessful or patient reports new pain  Outcome: Progressing     Problem: INFECTION - ADULT  Goal: Absence or prevention of progression during hospitalization  Description: INTERVENTIONS:  - Assess and monitor for signs and symptoms of infection  - Monitor lab/diagnostic results  - Monitor all insertion sites, i e  indwelling lines, tubes, and drains  - Monitor endotracheal if appropriate and nasal secretions for changes in amount and color  - [place] appropriate cooling/warming therapies per order  - Administer medications as ordered  - Instruct and encourage patient and family to use good hand hygiene technique  - Identify and instruct in appropriate isolation precautions for identified infection/condition  Outcome: Progressing     Problem: SAFETY ADULT  Goal: Patient will remain free of falls  Description: INTERVENTIONS:  - Assess patient frequently for physical needs  -  Identify cognitive and physical deficits and behaviors that affect risk of falls    -  [place] fall precautions as indicated by assessment   - Educate patient/family on patient safety including physical limitations  - Instruct patient to call for assistance with activity based on assessment  - Modify environment to reduce risk of injury  - Consider OT/PT consult to assist with strengthening/mobility  Outcome: Progressing     Problem: DISCHARGE PLANNING  Goal: Discharge to home or other facility with appropriate resources  Description: INTERVENTIONS:  - Identify barriers to discharge w/patient and caregiver  - Arrange for needed discharge resources and transportation as appropriate  - Identify discharge learning needs (meds, wound care, etc )  - Arrange for interpretive services to assist at discharge as needed  - Refer to Case Management Department for coordinating discharge planning if the patient needs post-hospital services based on physician/advanced practitioner order or complex needs related to functional status, cognitive ability, or social support system  Outcome: Progressing     Problem: POSTPARTUM  Goal: Experiences normal postpartum course  Description: INTERVENTIONS:  - Monitor maternal vital signs  - Assess uterine involution and lochia  Outcome: Progressing  Goal: Appropriate maternal -  bonding  Description: INTERVENTIONS:  - Identify family support  - Assess for appropriate maternal/infant bonding   -Encourage maternal/infant bonding opportunities  - Referral to  or  as needed  Outcome: Progressing  Goal: Establishment of infant feeding pattern  Description: INTERVENTIONS:  - Assess breast/bottle feeding  - Refer to lactation as needed  Outcome: Progressing  Goal: Incision(s), wounds(s) or drain site(s) healing without S/S of infection  Description: INTERVENTIONS  - Assess and document risk factors for skin impairment   - Assess and document dressing, incision, wound bed, drain sites and surrounding tissue  - Consider nutrition services referral as needed  - Oral mucous membranes remain intact  - Provide patient/ family education  Outcome: Progressing

## 2021-03-03 NOTE — LACTATION NOTE
This note was copied from a baby's chart  CONSULT - LACTATION  Baby Boy (Gilson Alves) Francisco Amaro 1 days male MRN: [id number]    [facility] NURSERY Room / Bed: (N)/(N) Encounter: [id number]    Maternal Information     MOTHER:  Lacey Betancourt  Maternal Age: 38 y o    OB History: # 1 - Date: None, Sex: None, Weight: None, GA: None, Delivery: None, Apgar1: None, Apgar5: None, Living: None, Birth Comments: None    # 2 - Date: None, Sex: None, Weight: None, GA: None, Delivery: None, Apgar1: None, Apgar5: None, Living: None, Birth Comments: None    # 3 - Date: 21, Sex: Male, Weight: 4105 g (9 lb 0 8 oz), GA: 39w0d, Delivery: Vaginal, Spontaneous, Apgar1: 9, Apgar5: 9, Living: Living, Birth Comments: None   Previouse breast reduction surgery? No  Lactation history:   Has patient previously breast fed: Yes   How long had patient previously breast fed: 6 mo, 12 mo   Previous breast feeding complications: None     Past Surgical History:   Procedure Laterality Date    VAGINAL DELIVERY      WISDOM TOOTH EXTRACTION          Birth information:  YOB: 2023   Time of birth: 8:40 PM   Sex: male   Delivery type: Vaginal, Spontaneous   Birth Weight: 4105 g (9 lb 0 8 oz)   Percent of Weight Change: 0%     Gestational Age: 39w0d   [unfilled]    Assessment          Assessment: normal assessment      Feeding recommendations:  breast feed on demand     Met with mother  Provided mother with Ready, Set, Baby booklet  Discussed Skin to Skin contact an benefits to mom and baby  Talked about the delay of the first bath until baby has adjusted  Spoke about the benefits of rooming in  Feeding on cue and what that means for recognizing infant's hunger  Avoidance of pacifiers for the first month discussed  Talked about exclusive breastfeeding for the first 6 months  Positioning and latch reviewed as well as showing images of other feeding positions    Discussed the properties of a good latch in any position  Reviewed hand/manual expression  Discussed s/s that baby is getting enough milk and some s/s that breastfeeding dyad may need further help  Gave information on common concerns, what to expect the first few weeks after delivery, preparing for other caregivers, and how partners can help  Resources for support also provided  Information on hand expression given  Discussed benefits of knowing how to manually express breast including stimulating milk supply, softening nipple for latch and evacuating breast in the event of engorgement  Discussed 2nd night syndrome and ways to calm infant  Hand out given       Lauren Acosta RN 3/3/2021 6:01 PM

## 2021-03-03 NOTE — PROGRESS NOTES
Progress Note - OB/GYN   Clmiguel Randhawa 38 y o  female MRN: [id number]  Unit/Bed#: -01 Encounter: [id number]    Assessment:  38 y o  Rochelle Grace s/p @ 2040 Spontaneous Vaginal Delivery Postpartum day  1  Pregnancy complicated by PCOS, obesity, COVID-19 in pregnancy  Patient recovering well, Stable    Plan:  1  Postpartum  Continue routine post partum care  Pain management PRN  Encourage ambulation  Encourage breastfeeding  Postpartum birth control:  Due to patient's PCOS, she would like to utilize a type of birth control that was not associated with irregular bleeding therefore she would like to try combined OCPs, still considering a bridge    2   Discharge  Anticipate d/c tomorrow      Subjective/Objective   Chief Complaint:    Postpartum state    Subjective:   Pain: yes, cramping, improved with meds  Tolerating PO: yes  Voiding: yes  Flatus: no  BM: no  Ambulating: yes  Breastfeeding:  yes  Chest pain: no  Shortness of breath: no  Leg pain: no  Lochia: minimal    Objective:     Vitals: Temp:  [97 9 °F (36 6 °C)-98 8 °F (37 1 °C)] 98 4 °F (36 9 °C)  HR:  [] 89  Resp:  [16-18] 18  BP: (109-156)/(56-91) 110/60       Intake/Output Summary (Last 24 hours) at 3/3/2021 0615  Last data filed at 3/3/2021 0515  Gross per 24 hour   Intake 1576 23 ml   Output 2432 ml   Net -855 77 ml         Physical Exam:   General: NAD, alert, oriented  Cardio: Regular rate and rhythm, no murmur  Resp: nonlabored breathing, clear to auscultation bilaterally  Abdomen: Soft, no distension/rebound/guarding/tenderness   Fundus: Firm, non-tender, fundus: at umbilicus  G/U:  Minimal lochia noted on pad  Lower Extremities: Non-tender, no palpable cords    Medications:  Current Facility-Administered Medications   Medication Dose Route Frequency    acetaminophen (TYLENOL) tablet 650 mg  650 mg Oral Q4H PRN    benzocaine-menthol-lanolin-aloe (DERMOPLAST) 20-0 5 % topical spray   Topical 4x Daily PRN    calcium carbonate (TUMS) chewable tablet 1,000 mg  1,000 mg Oral Daily PRN    diphenhydrAMINE (BENADRYL) injection 25 mg  25 mg Intravenous Q6H PRN    docusate sodium (COLACE) capsule 100 mg  100 mg Oral BID    hydrocortisone 1 % cream 1 application  1 application Topical 4x Daily PRN    ibuprofen (MOTRIN) tablet 600 mg  600 mg Oral Q6H PRN    ondansetron (ZOFRAN) injection 4 mg  4 mg Intravenous Q8H PRN    senna (SENOKOT) tablet 8 6 mg  1 tablet Oral Daily    witch hazel-glycerin (TUCKS) topical pad 1 pad  1 pad Topical Q2H PRN     Facility-Administered Medications Ordered in Other Encounters   Medication Dose Route Frequency    bupivacaine (PF) (MARCAINE) 0 25 % injection    PRN    lidocaine-epinephrine (XYLOCAINE-MPF/EPINEPHRINE) 1 5 %-1:200,000 injection    PRN       Labs:   Recent Results (from the past 24 hour(s))   Type and screen    Collection Time: 03/02/21  8:15 AM   Result Value Ref Range    ABO Grouping O     Rh Factor Positive     Antibody Screen Negative     Specimen Expiration Date [id number]    CBC and differential    Collection Time: 03/02/21  8:15 AM   Result Value Ref Range    WBC 9 37 4 31 - 10 16 Thousand/uL    RBC 3 87 3 81 - 5 12 Million/uL    Hemoglobin 12 0 11 5 - 15 4 g/dL    Hematocrit 35 9 34 8 - 46 1 %    MCV 93 82 - 98 fL    MCH 31 0 26 8 - 34 3 pg    MCHC 33 4 31 4 - 37 4 g/dL    RDW 13 3 11 6 - 15 1 %    MPV 9 9 8 9 - 12 7 fL    Platelets 141 028 - 068 Thousands/uL    nRBC 0 /100 WBCs    Neutrophils Relative 73 43 - 75 %    Immat GRANS % 1 0 - 2 %    Lymphocytes Relative 16 14 - 44 %    Monocytes Relative 9 4 - 12 %    Eosinophils Relative 1 0 - 6 %    Basophils Relative 0 0 - 1 %    Neutrophils Absolute 6 77 1 85 - 7 62 Thousands/µL    Immature Grans Absolute 0 10 0 00 - 0 20 Thousand/uL    Lymphocytes Absolute 1 52 0 60 - 4 47 Thousands/µL    Monocytes Absolute 0 88 0 17 - 1 22 Thousand/µL    Eosinophils Absolute 0 07 0 00 - 0 61 Thousand/µL    Basophils Absolute 0 03 0 00 - 0 10 Thousands/µL   RPR Collection Time: 03/02/21  8:15 AM   Result Value Ref Range    RPR Non-Reactive Non-Reactive   Green Top 4 ml on hold    Collection Time: 03/02/21  8:16 AM   Result Value Ref Range    Extra Tube Hold for add ons    Blood gas, arterial, cord    Collection Time: 03/02/21  8:48 PM   Result Value Ref Range    pH, Cord Art 7 211 (L) 7 230 - 7 430    pCO2, Cord Art 67 2 (H) 30 0 - 60 0    pO2, Cord Art 27 5 (H) 5 0 - 25 0 mm HG    HCO3, Cord Art 26 3 17 3 - 27 3 mmol/L    Base Exc, Cord Art -3 3 (L) 3 0 - 11 0 mmol/L    O2 Content, Cord Art 13 1 ml/dl    O2 Hgb, Arterial Cord 57 7 %   Blood gas, venous, cord    Collection Time: 03/02/21  8:48 PM   Result Value Ref Range    pH, Cord [place]le[place] 7 363 7 190 - 7 490    pCO2, Cord [place] 45 6 (H) 27 0 - 43 0 mm HG    pO2, Cord [place] 35 0 15 0 - 45 0 mm HG    HCO3, Cord [place] 25 4 12 2 - 28 6 mmol/L    Base Exc, Cord [place] -0 4 (L) 1 0 - 9 0 mmol/L    O2 Cont, Cord [place] 17 1 mL/dL    O2 HGB,VENOUS CORD 81 4 %         Payton Lesch  Ob/Gyn PGY-1  3/3/2021  6:15 AM

## 2021-03-03 NOTE — PLAN OF CARE
Problem: PAIN - ADULT  Goal: Verbalizes/displays adequate comfort level or baseline comfort level  Description: Interventions:  - Encourage patient to monitor pain and request assistance  - Assess pain using appropriate pain scale  - Administer analgesics based on type and severity of pain and evaluate response  - Implement non-pharmacological measures as appropriate and evaluate response  - Consider cultural and social influences on pain and pain management  - Notify physician/advanced practitioner if interventions unsuccessful or patient reports new pain  3/2/2021 2117 by Rachana Mcbride RN  Outcome: Progressing  3/2/2021 1910 by Rachana Mcbride RN  Outcome: Progressing     Problem: INFECTION - ADULT  Goal: Absence or prevention of progression during hospitalization  Description: INTERVENTIONS:  - Assess and monitor for signs and symptoms of infection  - Monitor lab/diagnostic results  - Monitor all insertion sites, i e  indwelling lines, tubes, and drains  - Monitor endotracheal if appropriate and nasal secretions for changes in amount and color  - [place] appropriate cooling/warming therapies per order  - Administer medications as ordered  - Instruct and encourage patient and family to use good hand hygiene technique  - Identify and instruct in appropriate isolation precautions for identified infection/condition  3/2/2021 2117 by Rachana Mcbride RN  Outcome: Progressing  3/2/2021 1910 by Rachana Mcbride RN  Outcome: Progressing  Goal: Absence of fever/infection during neutropenic period  Description: INTERVENTIONS:  - Monitor WBC    3/2/2021 2117 by Rachana Mcbride RN  Outcome: Progressing  3/2/2021 1910 by Rachana Mcbride RN  Outcome: Progressing     Problem: SAFETY ADULT  Goal: Patient will remain free of falls  Description: INTERVENTIONS:  - Assess patient frequently for physical needs  -  Identify cognitive and physical deficits and behaviors that affect risk of falls    -  [place] fall precautions as indicated by assessment   - Educate patient/family on patient safety including physical limitations  - Instruct patient to call for assistance with activity based on assessment  - Modify environment to reduce risk of injury  - Consider OT/PT consult to assist with strengthening/mobility  3/2/2021 2117 by Jeremiah Mcbride RN  Outcome: Progressing  3/2/2021 1910 by Jeremiah Mcbride RN  Outcome: Progressing  Goal: Maintain or return to baseline ADL function  Description: INTERVENTIONS:  -  Assess patient's ability to carry out ADLs; assess patient's baseline for ADL function and identify physical deficits which impact ability to perform ADLs (bathing, care of mouth/teeth, toileting, grooming, dressing, etc )  - Assess/evaluate cause of self-care deficits   - Assess range of motion  - Assess patient's mobility; develop plan if impaired  - Assess patient's need for assistive devices and provide as appropriate  - Encourage maximum independence but intervene and supervise when necessary  - Involve family in performance of ADLs  - Assess for home care needs following discharge   - Consider OT consult to assist with ADL evaluation and planning for discharge  - Provide patient education as appropriate  3/2/2021 2117 by Jeremiah Mcbride RN  Outcome: Progressing  3/2/2021 1910 by Jeremiah Mcbride RN  Outcome: Progressing  Goal: Maintain or return mobility status to optimal level  Description: INTERVENTIONS:  - Assess patient's baseline mobility status (ambulation, transfers, stairs, etc )    - Identify cognitive and physical deficits and behaviors that affect mobility  - Identify mobility aids required to assist with transfers and/or ambulation (gait belt, sit-to-stand, lift, walker, cane, etc )  - [place] fall precautions as indicated by assessment  - Record patient progress and toleration of activity level on Mobility SBAR; progress patient to next Phase/Stage  - Instruct patient to call for assistance with activity based on assessment  - Consider rehabilitation consult to assist with strengthening/weightbearing, etc   3/2/2021 2117 by Cb Godinez RN  Outcome: Progressing  3/2/2021 1910 by Cb Godinez RN  Outcome: Progressing     Problem: DISCHARGE PLANNING  Goal: Discharge to home or other facility with appropriate resources  Description: INTERVENTIONS:  - Identify barriers to discharge w/patient and caregiver  - Arrange for needed discharge resources and transportation as appropriate  - Identify discharge learning needs (meds, wound care, etc )  - Arrange for interpretive services to assist at discharge as needed  - Refer to Case Management Department for coordinating discharge planning if the patient needs post-hospital services based on physician/advanced practitioner order or complex needs related to functional status, cognitive ability, or social support system  3/2/2021 2117 by Cb Godinez RN  Outcome: Progressing  3/2/2021 1910 by Cb Godinez RN  Outcome: Progressing     Problem: POSTPARTUM  Goal: Experiences normal postpartum course  Description: INTERVENTIONS:  - Monitor maternal vital signs  - Assess uterine involution and lochia  Outcome: Progressing  Goal: Appropriate maternal -  bonding  Description: INTERVENTIONS:  - Identify family support  - Assess for appropriate maternal/infant bonding   -Encourage maternal/infant bonding opportunities  - Referral to  or  as needed  Outcome: Progressing  Goal: Establishment of infant feeding pattern  Description: INTERVENTIONS:  - Assess breast/bottle feeding  - Refer to lactation as needed  Outcome: Progressing  Goal: Incision(s), wounds(s) or drain site(s) healing without S/S of infection  Description: INTERVENTIONS  - Assess and document risk factors for skin impairment   - Assess and document dressing, incision, wound bed, drain sites and surrounding tissue  - Consider nutrition services referral as needed  - Oral mucous membranes remain intact  - Provide patient/ family education  Outcome: Progressing     Problem: Knowledge Deficit  Goal: Verbalizes understanding of labor plan  Description: Assess patient/family/caregiver's baseline knowledge level and ability to understand information  Provide education via patient/family/caregiver's preferred learning method at appropriate level of understanding  1  Provide teaching at level of understanding  2  Provide teaching via preferred learning method(s)  3/2/2021 2117 by Jeanette Moore RN  Outcome: Completed  3/2/2021 1910 by Jeanette Moore RN  Outcome: Progressing  Goal: Patient/family/caregiver demonstrates understanding of disease process, treatment plan, medications, and discharge instructions  Description: Complete learning assessment and assess knowledge base  Interventions:  - Provide teaching at level of understanding  - Provide teaching via preferred learning methods  3/2/2021 2117 by Jeanette Moore RN  Outcome: Completed  3/2/2021 1910 by Jeanette Moore RN  Outcome: Progressing     Problem: Labor & Delivery  Goal: Manages discomfort  Description: Assess and monitor for signs and symptoms of discomfort  Assess patient's pain level regularly and per hospital policy  Administer medications as ordered  Support use of nonpharmacological methods to help control pain such as distraction, imagery, relaxation, and application of heat and cold  Collaborate with interdisciplinary team and patient to determine appropriate pain management plan  1  Include patient in decisions related to comfort  2  Offer non-pharmacological pain management interventions  3  Report ineffective pain management to physician  3/2/2021 2117 by Jeanette Moore RN  Outcome: Completed  3/2/2021 1910 by Jeanette Moore RN  Outcome: Progressing  Goal: Patient vital signs are stable  Description: 1  Assess vital signs - vaginal delivery    3/2/2021 2117 by Yomi Luciano RN  Outcome: Completed  3/2/2021 1910 by Yomi Luciano RN  Outcome: Progressing

## 2021-03-03 NOTE — L&D DELIVERY NOTE
Vaginal Delivery Summary - OB/GYN   Jodi Medina 38 y o  female MRN: [id number]  Unit/Bed#: -01 Encounter: [id number]    Predelivery Diagnosis:  1  Pregnancy at 39w0d     Postdelivery Diagnosis:  1  Same as above  2  Delivery of term     Procedure: Spontaneous Vaginal Delivery    Attending: Dr Brinda Hall    Assistant: Dr Earlene Duron / Dr Rosio Mcfarlane    Anesthesia: Epidural    QBL: 182cc  Admission H  Admission platelets: 950    Complications: none apparent    Specimens: cord blood, arterial and venous cord blood gasses, placenta to storage    Findings:   1  Viable male at , with APGARS of 9 and 9 at 1 and 5 minutes respectively,  2  Spontaneous delivery of intact placenta at   3  No lacerations  4  Blood gases:    Umbilical Cord Venous Blood Gas:  Results from last 7 days   Lab Units 21   PH COV  7 363   PCO2 COV mm HG 45 6*   HCO3 COV mmol/L 25 4   BASE EXC COV mmol/L -0 4*   O2 CT CD VB mL/dL 17 1   O2 HGB, VENOUS CORD % 60 8     Umbilical Cord Arterial Blood Gas:  Results from last 7 days   Lab Units 21   PH COA  7 211*   PCO2 COA  67 2*   PO2 COA mm HG 27 5*   HCO3 COA mmol/L 26 3   BASE EXC COA mmol/L -3 3*   O2 CONTENT CORD ART ml/dl 13 1   O2 HGB, ARTERIAL CORD % 57 7       Disposition:  Patient tolerated the procedure well and was recovering in labor and delivery room  Brief history and labor course:  Jodi Medina is a 38 y o   at 39wk0d  She presented to labor and delivery for an elective induction  She was induced with Pitocin and augmented with AROM  She progressed to complete cervical dilation and began pushing  Description of procedure    After pushing for 50 mins, the patient delivered a viable male  at , weight pending, apgars of 9 (1 min) and 9 (5 min)  The fetal vertex delivered spontaneously  There was no nuchal cord   The anterior shoulder delivered atraumatically with maternal expulsive forces and the assistance of gentle downward traction  The posterior shoulder delivered with maternal expulsive forces and the assistance of gentle upward traction  The remainder of the fetus delivered spontaneously  Upon delivery, the infant was placed on the mothers abdomen and the cord was clamped and cut  Delayed cord clamping was performed  The infant was noted to cry spontaneously and was moving all extremities appropriately  There was no evidence for injury  Awaiting nurse resuscitators evaluated the   Arterial and venous cord blood gases and cord blood was collected for analysis  These were promptly sent to the lab  In the immediate post-partum, IV pitocin was administered, and the uterus was noted to contract down well with massage and pitocin  The placenta delivered spontaneously at  and was noted to be intact and had an eccentrically inserted 3 vessel cord  The placenta was sent to storage  The vagina, cervix, perineum, and rectum were inspected and there were no lacerations  At the conclusion of the procedure, all needle, sponge, and instrument counts were noted to be correct  Patient tolerated the procedure well and was allowed to recover in labor and delivery room with family and  before being transferred to the post-partum floor  Dr Gerardo Lan was present and participated in all key portions of the case      Monse Li MD  3/2/2021  9:05 PM

## 2021-03-03 NOTE — DISCHARGE INSTRUCTIONS
Vaginal Delivery   WHAT YOU SHOULD KNOW:   A vaginal delivery is the birth of your baby through your vagina (birth canal)  AFTER YOU LEAVE:   Medicines:  · NSAIDs  help decrease swelling and pain or fever  This medicine is available with or without a doctor's order  NSAIDs can cause stomach bleeding or kidney problems in certain people  If you take blood thinner medicine, always ask your healthcare provider if NSAIDs are safe for you  Always read the medicine label and follow directions  · Take your medicine as directed  Call your healthcare provider if you think your medicine is not helping or if you have side effects  Tell him if you are allergic to any medicine  Keep a list of the medicines, vitamins, and herbs you take  Include the amounts, and when and why you take them  Bring the list or the pill bottles to follow-up visits  Carry your medicine list with you in case of an emergency  Follow up with your primary healthcare provider:  Most women need to return 6 weeks after a vaginal delivery  Ask about how to care for your wounds or stitches  Write down your questions so you remember to ask them during your visits  Activity:  Rest as much as possible  Try to keep all activities short  You may be able to do some exercise soon after you have your baby  Talk with your primary healthcare provider before you start exercising  If you work outside the home, ask when you can return to your job  Kegel exercises:  Kegel exercises may help your vaginal and rectal muscles heal faster  You can do Kegel exercises by tightening and relaxing the muscles around your vagina  Kegel exercises help make the muscles stronger  Breast care:  When your milk comes in, your breasts may feel full and hard  Ask how to care for your breasts, even if you are not breastfeeding  Constipation:  Do not try to push the bowel movement out if it is too hard   High-fiber foods, extra liquids, and regular exercise can help you prevent constipation  Examples of high-fiber foods are fruit and bran  Prune juice and water are good liquids to drink  Regular exercise helps your digestive system work  You may also be told to take over-the-counter fiber and stool softener medicines  Take these items as directed  Hemorrhoids:  Pregnancy can cause severe hemorrhoids  You may have rectal pain because of the hemorrhoids  Ask how to prevent or treat hemorrhoids  Perineum care: Your perineum is the area between your vagina and anus  Keep the area clean and dry to help it heal and to prevent infection  Wash the area gently with soap and water when you bathe or shower  Rinse your perineum with warm water when you use the toilet  Your primary healthcare provider may suggest you use a warm sitz bath to help decrease pain  A sitz bath is a bathtub or basin filled to hip level  Stay in the sitz bath for 20 to 30 minutes, or as directed  Vaginal discharge: You will have vaginal discharge, called lochia, after your delivery  The lochia is bright red the first day or two after the birth  By the fourth day, the amount decreases, and it turns red-brown  Use a sanitary pad rather than a tampon to prevent a vaginal infection  It is normal to have lochia up to 8 weeks after your baby is born  Monthly periods: Your period may start again within 7 to 12 weeks after your baby is born  If you are breastfeeding, it may take longer for your period to start again  You can still get pregnant again even though you do not have your monthly period  Talk with your primary healthcare provider about a birth control method that will be good for you if you do not want to get pregnant  Mood changes: Many new mothers have some kind of mood changes after delivery  Some of these changes occur because of lack of sleep, hormone changes, and caring for a new baby  Some mood changes can be more serious, such as postpartum depression   Talk with your primary healthcare provider if you feel unable to care for yourself or your baby  Sexual activity:  You may need to avoid sex for 6 to 7 weeks after you have your baby  You may notice you have a decreased desire for sex, or sex may be painful  You may need to use a vaginal lubricant (gel) to help make sex more comfortable  Contact your primary healthcare provider if:   · You have heavy vaginal bleeding that fills 1 or more sanitary pads in 1 hour  · You have a fever  · Your pain does not go away, or gets worse  · The skin between your vagina and rectum is swollen, warm, or red  · You have swollen, hard, or painful breasts  · You feel very sad or depressed  · You feel more tired than usual      · You have questions or concerns about your condition or care  Seek care immediately or call 911 if:   · You have pus or yellow drainage coming from your vagina or wound  · You are urinating very little, or not at all  · Your arm or leg feels warm, tender, and painful  It may look swollen and red  · You feel lightheaded, have sudden and worsening chest pain, or trouble breathing  You may have more pain when you take deep breaths or cough, or you may cough up blood  © 2014 [address] is for End User's use only and may not be sold, redistributed or otherwise used for commercial purposes  All illustrations and images included in CareNotes® are the copyrighted property of [employer] , [employer]  or [employer]  The above information is an  only  It is not intended as medical advice for individual conditions or treatments  Talk to your doctor, nurse or pharmacist before following any medical regimen to see if it is safe and effective for you

## 2021-03-03 NOTE — DISCHARGE SUMMARY
Discharge Summary - OB/GYN  Perry Alba 38 y o  female MRN: [id number]  Unit/Bed#: -01 Encounter: [id number]    Admission Date: 3/2/2021     Discharge Date: 3/4/2021    Admitting Attending: Dr Jillian Lynch  Delivering Attending: Dr Betty Almeida  Discharging Attending: Carrie Suggs    Admitting Diagnosis:   Patient Active Problem List   Diagnosis    PCOS (polycystic ovarian syndrome)    Possible exposure to STD    Third trimester pregnancy    Close exposure to COVID-19 virus    COVID-19 virus infection    Obesity in pregnancy, antepartum, third trimester    Maternal morbid obesity in third trimester, antepartum ([address] )    39 weeks gestation of pregnancy     (spontaneous vaginal delivery)   1  Discharge Diagnosis:   Same, delivered      Procedures: spontaneous vaginal delivery    Anesthesia: epidural    Hospital course: Perry Alba is a 38 y o   at 39w0d who was initially admitted for elective induction of labor  She was induced with Pitocin and augmented with AROM  She progressed to complete cervical dilation and began pushing  She delivered a viable male  on 3/2/21 @  via normal spontaneous vaginal delivery  She sustained no lacerations during delivery  Birth weight 9lbs 0 8oz  Apgars were 9 (1 min) and 9 (5 min)   was transferred to the  nursery  Patient tolerated the procedure well  Her post-delivery course was complicated by   Her postpartum pain was well controlled with oral analgesics  Her contraception plan is   On day of discharge, she was ambulating and able to reasonably perform all ADLs  She was voiding and had appropriate bowel function  Pain was well controlled  She was discharged home on postpartum day # without complications  Patient was instructed to follow up with her OBGYN as an outpatient and was given appropriate warnings to call provider if she develops signs of infection or uncontrolled pain      Complications: none apparent    Condition at discharge: good        Provisions for Follow-Up Care:  See after visit summary for information related to follow-up care and any pertinent home health orders  Disposition: Home    Planned Readmission: No    Discharge Medications:   Please see AVS for a complete list of discharge medications  Discharge instructions :   -Do not place anything (no intercourse, tampons or douche) in your vagina for at least 6 weeks  -You may walk for exercise for the first 6 weeks then gradually return to your usual activities    -You may take baths or shower per your preference    -Please look at your bust (breasts) in the mirror daily and call provider for redness or tenderness or increased warmth    -Please call your provider if temperature > 100 4*F or 38* C, worsening pain or a foul discharge   -Please follow up in  weeks for your postpartum visit

## 2021-03-04 ENCOUNTER — TELEPHONE (OUTPATIENT)
Dept: OBGYN CLINIC | Facility: CLINIC | Age: 36
End: 2021-03-04

## 2021-03-04 VITALS
OXYGEN SATURATION: 98 % | RESPIRATION RATE: 18 BRPM | HEART RATE: 79 BPM | BODY MASS INDEX: 42.91 KG/M2 | HEIGHT: 66 IN | WEIGHT: 267 LBS | SYSTOLIC BLOOD PRESSURE: 129 MMHG | DIASTOLIC BLOOD PRESSURE: 80 MMHG | TEMPERATURE: 98.3 F

## 2021-03-04 PROBLEM — Z3A.39 39 WEEKS GESTATION OF PREGNANCY: Status: RESOLVED | Noted: 2021-03-02 | Resolved: 2021-03-04

## 2021-03-04 PROBLEM — Z3A.37 37 WEEKS GESTATION OF PREGNANCY: Status: RESOLVED | Noted: 2021-02-11 | Resolved: 2021-03-04

## 2021-03-04 PROBLEM — Z34.93 THIRD TRIMESTER PREGNANCY: Status: RESOLVED | Noted: 2020-09-14 | Resolved: 2021-03-04

## 2021-03-04 PROBLEM — E28.2 PCOS (POLYCYSTIC OVARIAN SYNDROME): Status: RESOLVED | Noted: 2020-02-11 | Resolved: 2021-03-04

## 2021-03-04 PROCEDURE — 99024 POSTOP FOLLOW-UP VISIT: CPT | Performed by: OBSTETRICS & GYNECOLOGY

## 2021-03-04 RX ORDER — DIAPER,BRIEF,INFANT-TODD,DISP
1 EACH MISCELLANEOUS 4 TIMES DAILY PRN
Qty: 30 G | Refills: 0
Start: 2021-03-04 | End: 2021-07-14 | Stop reason: ALTCHOICE

## 2021-03-04 RX ORDER — ACETAMINOPHEN 325 MG/1
975 TABLET ORAL EVERY 8 HOURS PRN
Status: DISCONTINUED | OUTPATIENT
Start: 2021-03-04 | End: 2021-03-04 | Stop reason: HOSPADM

## 2021-03-04 RX ORDER — DOCUSATE SODIUM 100 MG/1
100 CAPSULE, LIQUID FILLED ORAL 2 TIMES DAILY
Qty: 10 CAPSULE | Refills: 0 | Status: SHIPPED | OUTPATIENT
Start: 2021-03-04 | End: 2021-07-14 | Stop reason: ALTCHOICE

## 2021-03-04 RX ORDER — IBUPROFEN 600 MG/1
600 TABLET ORAL EVERY 6 HOURS PRN
Qty: 30 TABLET | Refills: 0 | Status: SHIPPED | OUTPATIENT
Start: 2021-03-04 | End: 2021-07-14 | Stop reason: ALTCHOICE

## 2021-03-04 RX ADMIN — ACETAMINOPHEN 650 MG: 325 TABLET, FILM COATED ORAL at 02:18

## 2021-03-04 RX ADMIN — DOCUSATE SODIUM 100 MG: 100 CAPSULE, LIQUID FILLED ORAL at 09:17

## 2021-03-04 RX ADMIN — ACETAMINOPHEN 975 MG: 325 TABLET, FILM COATED ORAL at 09:25

## 2021-03-04 RX ADMIN — WITCH HAZEL 1 PAD: 500 SOLUTION RECTAL; TOPICAL at 05:12

## 2021-03-04 RX ADMIN — SENNOSIDES 8.6 MG: 8.6 TABLET, FILM COATED ORAL at 09:17

## 2021-03-04 RX ADMIN — IBUPROFEN 600 MG: 600 TABLET ORAL at 05:10

## 2021-03-04 NOTE — CASE MANAGEMENT
[facility] referral sent to [facility] for Spectra S2 pump for room delivery

## 2021-03-04 NOTE — TELEPHONE ENCOUNTER
Transition of Care Post Partum phone call: Congratulations  Date of delivery: 2021  Weeks gestation: full term 39 weeks  Type of delivery: vaginal delivery  Sex of child: male  Name of child:   Birth weight: 4105 gm   9lbs 1ounces  Perineum laceration: No   How are you feeling: good, a little sore  Vaginal bleeding status: moderate  Urinary status: none  Bowel movement: Yes  Incision status: NA   Pain control: ibuprofen (OTC)   feeding: Breastfeeding   Any baby blues: No  Patient will call for 3 week PP appt when she knows babies schedule

## 2021-03-04 NOTE — PROGRESS NOTES
Progress Note - OB/GYN   Curly Ontiveros 38 y o  female MRN: [id number]  Unit/Bed#: -01 Encounter: [id number]    Assessment:  38 y o  y o   female now PPD#2 s/p Spontaneous Vaginal Delivery,@   Patient is stable and Recovering well  Pregnancy complicated by PCOS, obesity, and COVID-19 in pregnancy  Plan:    1) Postpartum  - Continue routine postpartum care  - Pain control: PO meds on board   - Patient is ambulating, encouraged to continue  - Patient is breastfeeding     2) Disposition  - Anticipate discharge home today      Subjective/Objective     Subjective:  Patient feels well and denies any complaints  Pain: Well controlled   Tolerating PO: Yes  Voiding: Yes  Flatus: Yes  BM: Not yet  Ambulating: Yes  Breastfeeding: Breastfeeding  Chest pain: no  Shortness of breath: no  Leg pain: no  Lochia: minimal    Objective:     Vitals:  Vitals:    21 1633 21 2058 21 2357 21 0400   BP: 116/80 151/75 119/58 123/85   BP Location: Right arm Left arm     Pulse: 80 89 78 83   Resp: 18 18 18 18   Temp: 97 5 °F (36 4 °C) 97 5 °F (36 4 °C) 98 °F (36 7 °C) 98 °F (36 7 °C)   TempSrc: Oral Oral Oral Temporal   SpO2: 97% 97%  98%   Weight:       Height:           Physical Exam:   GEN: appears well, alert and oriented x 3, pleasant and cooperative   CV: +S1, +S2, no murmurs/rubs/gallops appreciated  RESP: no labored breathing  ABDOMEN: soft, no tenderness, no distention, Uterine fundus firm and non-tender, -1 cm below the umbilicus   EXTREMITIES: non-tender  NEURO Alert and oriented to person, place, and time         Lab Results   Component Value Date    WBC 9 37 2021    HGB 12 0 2021    HCT 35 9 2021    MCV 93 2021     2021         Juan Garcia MD, PGY-1  Family Medicine  21

## 2021-03-04 NOTE — PLAN OF CARE
Problem: PAIN - ADULT  Goal: Verbalizes/displays adequate comfort level or baseline comfort level  Description: Interventions:  - Encourage patient to monitor pain and request assistance  - Assess pain using appropriate pain scale  - Administer analgesics based on type and severity of pain and evaluate response  - Implement non-pharmacological measures as appropriate and evaluate response  - Consider cultural and social influences on pain and pain management  - Notify physician/advanced practitioner if interventions unsuccessful or patient reports new pain  Outcome: Completed     Problem: INFECTION - ADULT  Goal: Absence or prevention of progression during hospitalization  Description: INTERVENTIONS:  - Assess and monitor for signs and symptoms of infection  - Monitor lab/diagnostic results  - Monitor all insertion sites, i e  indwelling lines, tubes, and drains  - Monitor endotracheal if appropriate and nasal secretions for changes in amount and color  - [place] appropriate cooling/warming therapies per order  - Administer medications as ordered  - Instruct and encourage patient and family to use good hand hygiene technique  - Identify and instruct in appropriate isolation precautions for identified infection/condition  Outcome: Completed     Problem: SAFETY ADULT  Goal: Patient will remain free of falls  Description: INTERVENTIONS:  - Assess patient frequently for physical needs  -  Identify cognitive and physical deficits and behaviors that affect risk of falls    -  [place] fall precautions as indicated by assessment   - Educate patient/family on patient safety including physical limitations  - Instruct patient to call for assistance with activity based on assessment  - Modify environment to reduce risk of injury  - Consider OT/PT consult to assist with strengthening/mobility  Outcome: Completed     Problem: DISCHARGE PLANNING  Goal: Discharge to home or other facility with appropriate resources  Description: INTERVENTIONS:  - Identify barriers to discharge w/patient and caregiver  - Arrange for needed discharge resources and transportation as appropriate  - Identify discharge learning needs (meds, wound care, etc )  - Arrange for interpretive services to assist at discharge as needed  - Refer to Case Management Department for coordinating discharge planning if the patient needs post-hospital services based on physician/advanced practitioner order or complex needs related to functional status, cognitive ability, or social support system  Outcome: Completed     Problem: POSTPARTUM  Goal: Experiences normal postpartum course  Description: INTERVENTIONS:  - Monitor maternal vital signs  - Assess uterine involution and lochia  Outcome: Completed  Goal: Appropriate maternal -  bonding  Description: INTERVENTIONS:  - Identify family support  - Assess for appropriate maternal/infant bonding   -Encourage maternal/infant bonding opportunities  - Referral to  or  as needed  Outcome: Completed  Goal: Establishment of infant feeding pattern  Description: INTERVENTIONS:  - Assess breast/bottle feeding  - Refer to lactation as needed  Outcome: Completed  Goal: Incision(s), wounds(s) or drain site(s) healing without S/S of infection  Description: INTERVENTIONS  - Assess and document risk factors for skin impairment   - Assess and document dressing, incision, wound bed, drain sites and surrounding tissue  - Consider nutrition services referral as needed  - Oral mucous membranes remain intact  - Provide patient/ family education  Outcome: Completed

## 2021-03-04 NOTE — PLAN OF CARE
Problem: PAIN - ADULT  Goal: Verbalizes/displays adequate comfort level or baseline comfort level  Description: Interventions:  - Encourage patient to monitor pain and request assistance  - Assess pain using appropriate pain scale  - Administer analgesics based on type and severity of pain and evaluate response  - Implement non-pharmacological measures as appropriate and evaluate response  - Consider cultural and social influences on pain and pain management  - Notify physician/advanced practitioner if interventions unsuccessful or patient reports new pain  Outcome: Progressing     Problem: INFECTION - ADULT  Goal: Absence or prevention of progression during hospitalization  Description: INTERVENTIONS:  - Assess and monitor for signs and symptoms of infection  - Monitor lab/diagnostic results  - Monitor all insertion sites, i e  indwelling lines, tubes, and drains  - Monitor endotracheal if appropriate and nasal secretions for changes in amount and color  - [place] appropriate cooling/warming therapies per order  - Administer medications as ordered  - Instruct and encourage patient and family to use good hand hygiene technique  - Identify and instruct in appropriate isolation precautions for identified infection/condition  Outcome: Progressing     Problem: SAFETY ADULT  Goal: Patient will remain free of falls  Description: INTERVENTIONS:  - Assess patient frequently for physical needs  -  Identify cognitive and physical deficits and behaviors that affect risk of falls    -  [place] fall precautions as indicated by assessment   - Educate patient/family on patient safety including physical limitations  - Instruct patient to call for assistance with activity based on assessment  - Modify environment to reduce risk of injury  - Consider OT/PT consult to assist with strengthening/mobility  Outcome: Progressing     Problem: SAFETY ADULT  Goal: Patient will remain free of falls  Description: INTERVENTIONS:  - Assess patient frequently for physical needs  -  Identify cognitive and physical deficits and behaviors that affect risk of falls    -  [place] fall precautions as indicated by assessment   - Educate patient/family on patient safety including physical limitations  - Instruct patient to call for assistance with activity based on assessment  - Modify environment to reduce risk of injury  - Consider OT/PT consult to assist with strengthening/mobility  Outcome: Progressing     Problem: DISCHARGE PLANNING  Goal: Discharge to home or other facility with appropriate resources  Description: INTERVENTIONS:  - Identify barriers to discharge w/patient and caregiver  - Arrange for needed discharge resources and transportation as appropriate  - Identify discharge learning needs (meds, wound care, etc )  - Arrange for interpretive services to assist at discharge as needed  - Refer to Case Management Department for coordinating discharge planning if the patient needs post-hospital services based on physician/advanced practitioner order or complex needs related to functional status, cognitive ability, or social support system  Outcome: Progressing     Problem: POSTPARTUM  Goal: Experiences normal postpartum course  Description: INTERVENTIONS:  - Monitor maternal vital signs  - Assess uterine involution and lochia  Outcome: Progressing  Goal: Appropriate maternal -  bonding  Description: INTERVENTIONS:  - Identify family support  - Assess for appropriate maternal/infant bonding   -Encourage maternal/infant bonding opportunities  - Referral to  or  as needed  Outcome: Progressing  Goal: Establishment of infant feeding pattern  Description: INTERVENTIONS:  - Assess breast/bottle feeding  - Refer to lactation as needed  Outcome: Progressing  Goal: Incision(s), wounds(s) or drain site(s) healing without S/S of infection  Description: INTERVENTIONS  - Assess and document risk factors for skin impairment   - Assess and document dressing, incision, wound bed, drain sites and surrounding tissue  - Consider nutrition services referral as needed  - Oral mucous membranes remain intact  - Provide patient/ family education  Outcome: Progressing

## 2021-03-04 NOTE — LACTATION NOTE
This note was copied from a baby's chart  CONSULT - LACTATION  Baby Boy (Nash Myers) Prince Rg 2 days male MRN: [id number]    [facility] NURSERY Room / Bed: (N)/(N) Encounter: [id number]    Maternal Information     MOTHER:  Arlette   Maternal Age: 38 y o    OB History: # 1 - Date: None, Sex: None, Weight: None, GA: None, Delivery: None, Apgar1: None, Apgar5: None, Living: None, Birth Comments: None    # 2 - Date: None, Sex: None, Weight: None, GA: None, Delivery: None, Apgar1: None, Apgar5: None, Living: None, Birth Comments: None    # 3 - Date: 21, Sex: Male, Weight: 4105 g (9 lb 0 8 oz), GA: 39w0d, Delivery: Vaginal, Spontaneous, Apgar1: 9, Apgar5: 9, Living: Living, Birth Comments: None   Previouse breast reduction surgery?  No    Lactation history:   Has patient previously breast fed: Yes   How long had patient previously breast fed: 6 mo, 12 mo   Previous breast feeding complications: None     Past Surgical History:   Procedure Laterality Date    VAGINAL DELIVERY      WISDOM TOOTH EXTRACTION          Birth information:  YOB: 2023   Time of birth: 8:40 PM   Sex: male   Delivery type: Vaginal, Spontaneous   Birth Weight: 4105 g (9 lb 0 8 oz)   Percent of Weight Change: -4%     Gestational Age: 39w0d   [unfilled]    Assessment     Breast and nipple assessment: blistered nipples    [place] Assessment: normal assessment    Feeding assessment: feeding well  LATCH:  Latch: Grasps breast, tongue down, lips flanged, rhythmic sucking   Audible Swallowing: Spontaneous and intermittent (24 hours old)   Type of Nipple: Everted (After stimulation)   Comfort (Breast/Nipple): Filling, red/small blisters/bruises, mild/moderate discomfort   Hold (Positioning): Partial assist, teach one side, mother does other, staff holds   [facility] Score: 8          Feeding recommendations:  breast feed on demand     Observed Grisel feeding her baby at the breast   Worked on positioning at the breast  Blisters noted on left nipple  Infant irritable at the breast  Demonstrated techniques for having Mitzi Deleon obtain a deeper latch which did calm him and had Grisel report increased comfort with feeding  She is expecting delivery of Spectra S2 breast pump to room prior to discharge to home  Worked on positioning infant up at chest level and starting to feed infant with nose arriving at the nipple  Then, using areolar compression to achieve a deep latch that is comfortable and exchanges optimum amounts of milk  Met with mother to go over discharge breastfeeding booklet including the feeding log  Emphasized 8 or more (12) feedings in a 24 hour period, what to expect for the number of diapers per day of life and the progression of properties of the  stooling pattern  Reviewed breastfeeding and your lifestyle, storage and preparation of breast milk, how to keep you breast pump clean, the employed breastfeeding mother and paced bottle feeding handouts  Booklet included Breastfeeding Resources for after discharge including access to the number for the [address]  Discussed s/s engorgement, blocked milk ducts, and mastitis  Discussed how to remedy at home and when to contact physician  Encouraged parents to call for assistance, questions, and concerns about breastfeeding  Extension provided      Lyudmila Puckett RN 3/4/2021 10:39 AM

## 2021-03-05 NOTE — UTILIZATION REVIEW
Notification of Maternity/Delivery & [place] Birth Information for Admission   Notification of Maternity/Delivery for Admission to our facility [facility]  Be advised that this patient was admitted to our facility under Inpatient Status  Contact Jose Blas at [phone] for additional admission information  [facility] PARENT/CHILD HEALTH UR DEPT DEDICATED Charleen Mcallister [phone]  Mother & [place] Information   Patient Name: Pratima Pantoja   YOB: 1985   Delivering clinician: Anu Aquino   OB History as of 3/4/2021        3    Para   3    Term   3            AB        Living   3       SAB        TAB        Ectopic        Multiple   0    Live Births   3               [place] Name & MRN:   Information for the patient's :  Mikki Becerra [[id number]]     [place] Delivery Information:  Sex: male  Delivered 3/2/2021 8:40 PM by Vaginal, Spontaneous; Gestational Age: 36w0d     Measurements:  Weight: 9 lb 0 8 oz (4105 g); Height: 19 5"    APGAR 1 minute 5 minutes 10 minutes   Totals: 9 9       Birth Information: 38 y o  female MRN: [id number] Unit/Bed#: -01 Estimated Date of Delivery: 3/9/21  Birthweight: No birth weight on file  Gestational Age: <None> Delivery Type: Vaginal, Spontaneous      Authorization Information   Servicing Facility:   [place] [address]  Tax ID: [id number]  NPI: [id number] Attending Provider/NPI:  Phone:  Address: Anu Aquino, Azalia Ozuna [[id number]]  [phone]  Same as ANT/Victorino Langford 1106 of Service Code:  24 Place of Service Name: [address] [place] [address]   Start Date: 3/2/21 0732   Discharge Date & Time: 3/4/2021  3:00 PM    Type of Admission: Inpatient Status Discharge Disposition (if discharged): Home/Self Care   Patient Diagnoses:   Encounter for full-term uncomplicated delivery [D52]  The primary encounter diagnosis was  (spontaneous vaginal delivery)   A diagnosis of 39 weeks gestation of pregnancy was also pertinent to this visit  1   (spontaneous vaginal delivery)    2  39 weeks gestation of pregnancy       Orders: Admission Orders (From admission, onward)     Ordered        21 0741  INPATIENT ADMISSION  Once                    Assigned Utilization Review Contact: Rony Silva  Utilization   Network Utilization Review Department  Phone: [phone]; Fax [phone]  Email: [email or web address]

## 2021-03-08 LAB — PLACENTA IN STORAGE: NORMAL

## 2021-03-08 NOTE — UTILIZATION REVIEW
Notification of Maternity/Delivery & [place] Birth Information for Admission   Notification of Maternity/Delivery for Admission to our facility [facility]  Be advised that this patient was admitted to our facility under Inpatient Status  Contact Juancarlos Chapa at [phone] for additional admission information  [facility] PARENT/CHILD HEALTH UR DEPT DEDICATED Mark Hayward [phone]  Mother & [place] Information   Patient Name: Juve Jennings   YOB: 1985   Delivering clinician: Addis Walker   OB History as of 3/4/2021        3    Para   3    Term   3            AB        Living   3       SAB        TAB        Ectopic        Multiple   0    Live Births   3               [place] Name & MRN:   Information for the patient's :  Damon Barbosa [[id number]]      Delivery Information:  Sex: male  Delivered 3/2/2021 8:40 PM by Vaginal, Spontaneous; Gestational Age: 36w0d     Measurements:  Weight: 9 lb 0 8 oz (4105 g); Height: 19 5"    APGAR 1 minute 5 minutes 10 minutes   Totals: 9 9       Birth Information: 38 y o  female MRN: [id number] Unit/Bed#: -01 Estimated Date of Delivery: 3/9/21  Birthweight: No birth weight on file  Gestational Age: <None> Delivery Type: Vaginal, Spontaneous      Authorization Information   Servicing Facility:   [place] [address]  Tax ID: [id number]  NPI: [id number] Attending Provider/NPI:  Phone:  Address: Azalia Auguste [[id number]]  [phone]  Same as Asim Langford 1106 of Service Code:  24 Place of Service Name: [address] [place] [address]   Start Date: 3/2/21 0732   Discharge Date & Time: 3/4/2021  3:00 PM    Type of Admission: Inpatient Status Discharge Disposition (if discharged): Home/Self Care   Patient Diagnoses:   Encounter for full-term uncomplicated delivery [C81]  The primary encounter diagnosis was  (spontaneous vaginal delivery)   A diagnosis of 39 weeks gestation of pregnancy was also pertinent to this visit  1   (spontaneous vaginal delivery)    2  39 weeks gestation of pregnancy       Orders: Admission Orders (From admission, onward)     Ordered        21 0741  INPATIENT ADMISSION  Once                    Assigned Utilization Review Contact: Charmayne Franco  Utilization   Network Utilization Review Department  Phone: [phone]; Fax [phone]  Email: [email or web address]

## 2021-03-30 ENCOUNTER — POSTPARTUM VISIT (OUTPATIENT)
Dept: OBGYN CLINIC | Facility: CLINIC | Age: 36
End: 2021-03-30

## 2021-03-30 VITALS
RESPIRATION RATE: 16 BRPM | SYSTOLIC BLOOD PRESSURE: 112 MMHG | WEIGHT: 244.6 LBS | HEART RATE: 78 BPM | BODY MASS INDEX: 39.48 KG/M2 | DIASTOLIC BLOOD PRESSURE: 70 MMHG

## 2021-03-30 PROCEDURE — 99213 OFFICE O/P EST LOW 20 MIN: CPT | Performed by: NURSE PRACTITIONER

## 2021-03-30 NOTE — PROGRESS NOTES
Subjective     Grisel Jimenez is a 38 y o  y o  female [id number] who presents for a postpartum visit  She is 3 weeks postpartum following a spontaneous vaginal delivery on   03/02/2021  Her pregnancy was complicated by PCOS, obesity, COVID-19 in pregnancy  She had a baby boy a that weighed 9 lb 0 8 oz  She had no lacerations  Her sons name is "Mitzi Deleon"    I have fully reviewed the prenatal and intrapartum course  The delivery was at  39 weeks gestational weeks  Outcome: spontaneous vaginal delivery  Anesthesia: epidural  Postpartum course has been uneventful  [de-identified] course- baby was seen by Peds may have a fracture in clavical?  To f/u with orthopedist    [employer] is feeding by  breast -and she is pumping  Bleeding no bleeding  Bowel function is normal  Bladder function is normal  Patient is sexually active  Contraception method is condoms for now, desires OCP's later  Postpartum depression screening: negative  Scored a 3, has adequate support and help at home  The following portions of the patient's history were reviewed and updated as appropriate: allergies, current medications, past family history, past medical history, past social history, past surgical history and problem list     Review of Systems  Pertinent items are noted in HPI       Objective     /70 (BP Location: Right arm, Patient Position: Sitting, Cuff Size: Large)   Pulse 78   Resp 16   Wt 111 kg (244 lb 9 6 oz)   BMI 39 48 kg/m²     General:   appears stated age, cooperative, alert normal mood and affect   Neck: Neck: normal, supple,trachea midline, no masses   Heart: regular rate and rhythm, S1, S2 normal, no murmur, click, rub or gallop   Lungs: clear to auscultation bilaterally   Breasts: Rt axilla- pt felt a lump that has since then resolved  No masses or pain found   Denies other concerns   Abdomen: soft, non-tender, without masses or organomegaly   Vulva: Deferred, denies concerns   Vagina:    Urethra:    Cervix:    Uterus: Adnexa:      Assessment/Plan     3 wk postpartum exam  Pap smear not done at today's visit  Her last Pap was 01/23/2020 and was negative with negative HR HPV  Continue vitamins until gone  Hx of PCOS    1  Contraception: condoms, pt plans to switch to OCP's when she stops breastfeeding, declines contraception at this time  2   3 months for annual gyn exam  3  Follow up in: 3 months or as needed

## 2021-06-01 ENCOUNTER — TELEMEDICINE (OUTPATIENT)
Dept: OBGYN CLINIC | Facility: CLINIC | Age: 36
End: 2021-06-01

## 2021-06-01 DIAGNOSIS — N64.59 ENGORGEMENT OF BREAST: Primary | ICD-10-CM

## 2021-06-01 PROCEDURE — 99213 OFFICE O/P EST LOW 20 MIN: CPT | Performed by: FAMILY MEDICINE

## 2021-06-01 NOTE — PROGRESS NOTES
Virtual Regular Visit      Assessment/Plan:    Problem List Items Addressed This Visit     None      Visit Diagnoses     Engorgement of breast    -  Primary  - Patient with likely blocked breast duct of the right side x 1 day  - Will trial with conservative measures including warm compresses while BF/pumping and tylenol/ibupofen for pain PRN  - Will follow up on 21 to reassess symptoms               Reason for visit is   Chief Complaint   Patient presents with    Virtual Regular Visit        Encounter provider Resident Ashly Weller    Provider located at [address]  [phone]      Recent Visits  No visits were found meeting these conditions  Showing recent visits within past 7 days and meeting all other requirements     Today's Visits  Date Type Provider Dept   21 Telemedicine OBGYN RESIDENT [address] today's visits and meeting all other requirements     Future Appointments  No visits were found meeting these conditions  Showing future appointments within next 150 days and meeting all other requirements        The patient was identified by name and date of birth  Christo Peng was informed that this is a telemedicine visit and that the visit is being conducted through [facility] and patient was informed that this is a secure, HIPAA-compliant platform  She agrees to proceed     My office door was closed  The patient was notified the following individuals were present in the room Dr Pierson  She acknowledged consent and understanding of privacy and security of the video platform  The patient has agreed to participate and understands they can discontinue the visit at any time  Patient is aware this is a billable service       Subjective  Christo Peng is a 43 y o  female       Patient is a  mother who presents with complaint of painful right breast which started yesterday  Patient states that on Sunday infant was breastfeeding and she noticed a small laceration with bleeding after infant finished feeding  She reports aching pain of the right breast, but denies any rash, fevers, or chills  Patient states she is currently breastfeeding and pumping and has noticed decreased milk production in right breast (1 oz) compared to left breast 5oz)  Patient is alternating sides during BF and pumping and reports she BF/pumps every 3-4 hours, she reports 15 mins sessions per side during each feed           Past Medical History:   Diagnosis Date    Chlamydia     Varicella        Past Surgical History:   Procedure Laterality Date    VAGINAL DELIVERY      WISDOM TOOTH EXTRACTION         Current Outpatient Medications   Medication Sig Dispense Refill    aspirin (ECOTRIN LOW STRENGTH) 81 mg EC tablet TAKE 2 TABLETS (162 MG TOTAL) BY MOUTH DAILY (Patient not taking: Reported on 3/30/2021) 180 tablet 1    benzocaine-menthol-lanolin-aloe (DERMOPLAST) 20-0 5 % topical spray Apply 1 application topically 4 (four) times a day as needed for irritation (Patient not taking: Reported on 3/30/2021)  0    docusate sodium (COLACE) 100 mg capsule Take 1 capsule (100 mg total) by mouth 2 (two) times a day (Patient not taking: Reported on 3/30/2021) 10 capsule 0    hydrocortisone 1 % cream Apply 1 application topically 4 (four) times a day as needed for irritation (Patient not taking: Reported on 3/30/2021) 30 g 0    ibuprofen (MOTRIN) 600 mg tablet Take 1 tablet (600 mg total) by mouth every 6 (six) hours as needed for moderate pain (Patient not taking: Reported on 3/30/2021) 30 tablet 0    Prenatal Vit-Fe Fumarate-FA (PRENATAL 1+1 PO) Take by mouth      witch hazel-glycerin (TUCKS) topical pad Apply 1 pad topically every 2 (two) hours as needed for irritation, hemorrhoids or elvira-anal irritation (Patient not taking: Reported on 3/30/2021)  0     No current facility-administered medications for this visit  No Known Allergies    Review of Systems   Constitutional: Negative for chills and fever  Respiratory: Negative for shortness of breath  Cardiovascular: Negative for chest pain  Gastrointestinal: Negative for nausea and vomiting  Musculoskeletal:        Right breast pain   Skin: Positive for wound  Video Exam    There were no vitals filed for this visit  Physical Exam  Constitutional:       Appearance: Normal appearance  HENT:      Head: Normocephalic and atraumatic  Nose: Nose normal    Eyes:      Conjunctiva/sclera: Conjunctivae normal    Pulmonary:      Effort: Pulmonary effort is normal    Chest:      Breasts:         Right: Tenderness present  No inverted nipple, nipple discharge or skin change  Comments: Tenderness on palpation (when patient palpating breast)  Neurological:      Mental Status: She is alert and oriented to person, place, and time  Psychiatric:         Mood and Affect: Mood normal          Behavior: Behavior normal           I spent 25 minutes directly with the patient during this visit      VIRTUAL VISIT DISCLAIMER    Christo Peng acknowledges that she has consented to an online visit or consultation  She understands that the online visit is based solely on information provided by her, and that, in the absence of a face-to-face physical evaluation by the physician, the diagnosis she receives is both limited and provisional in terms of accuracy and completeness  This is not intended to replace a full medical face-to-face evaluation by the physician  Christo Peng understands and accepts these terms

## 2021-07-13 NOTE — PROGRESS NOTES
ASSESSMENT & PLAN: Keke Hernandez is a 43 y o  [id number] with normal gynecologic exam     1   Routine well woman exam done today  2  Pap and HPV:  The patient's last pap and hpv was 2020  It was normal     Pap and cotesting was not done today  Current ASCCP Guidelines reviewed  Next due in 2025  3  The following were reviewed in today's visit: breast self exam, STD testing, adequate intake of calcium and vitamin D, exercise and healthy diet  4  Gardisil vaccine in women up to age 39 discussed and information was provided  5  BMI 40 19- referred to nutritional services per pt's request    6  Information provided on PCOS, reviewed inportance of interval of menses when she stops nursing, pt to monitor  BMI Counseling: Body mass index is 40 19 kg/m²  The BMI is above normal  Nutrition recommendations include reducing portion sizes, 3-5 servings of fruits/vegetables daily, moderation in carbohydrate intake and increasing intake of lean protein  Exercise recommendations include exercising 3-5 times per week  CC:  Annual Gynecologic Examination    HPI: Keke Hernandez is a 43 y o  [id number] who presents for annual gynecologic examination  Her last Pap was 2020 and was negative with negative high-risk HPV  Denies abnormal Pap history  History of  2021 had a boy that weighed 9 lb 0 8 oz, his name is " Meet Masterson"  History of PCOS,, obesity, COVID-19 in her pregnancy  Currently amenorrheic  She is breastfeeding  Hx of having a clogged duct that resolved  Pt will accept information on Covid 19 vaccine and nursing  Contraception-condoms      Health Maintenance:    She wears her seatbelt routinely  She does perform irregular monthly self breast exams  She feels safe at home       Past Medical History:   Diagnosis Date    Chlamydia     Maternal morbid obesity in third trimester, antepartum ([facility] [address] ) 2021    Obesity in pregnancy, antepartum, third trimester 2020    Varicella        Past Surgical History:   Procedure Laterality Date    VAGINAL DELIVERY      WISDOM TOOTH EXTRACTION         Past OB/Gyn History:  OB History        3    Para   3    Term   3            AB        Living   3       SAB        TAB        Ectopic        Multiple   0    Live Births   3               Pt has menstrual issues  Currently ammenorrheic with breastfeeding her 2 month old   History of sexually transmitted infection: No   History of abnormal pap smears: No      Patient is currently sexually active    heterosexual   The current method of family planning is condoms     Family History   Problem Relation Age of Onset   [facility] Hypertension Mother     Hypertension Father     Hyperlipidemia Father     No Known Problems Sister     No Known Problems Son     Heart failure Maternal Grandfather     Pneumonia Paternal Grandmother     Heart failure Paternal Grandfather     No Known Problems Son        Social History:  Social History     Socioeconomic History    Marital status: /Civil Union     Spouse name: Will    Number of children: 2    Years of education: 15    Highest education level: High school graduate   Occupational History    Not on file   Tobacco Use    Smoking status: Former Smoker     Packs/day: 0 50     Years: 10 00     Pack years: 5 00     Types: Cigarettes     Quit date:      Years since quittin 5    Smokeless tobacco: Never Used   Vaping Use    Vaping Use: Never used   Substance and Sexual Activity    Alcohol use: Not Currently    Drug use: Never    Sexual activity: Yes     Partners: Male     Birth control/protection: None   Other Topics Concern    Not on file   Social History Narrative    Not on file     Social Determinants of Health     Financial Resource Strain: Low Risk     Difficulty of Paying Living Expenses: Not hard at all   Food Insecurity: No Food Insecurity    Worried About Running Out of Food in the Last Year: Never true    Nini of Food in the Last Year: Never true   Transportation Needs: No Transportation Needs    Lack of Transportation (Medical): No    Lack of Transportation (Non-Medical): No   Physical Activity: Insufficiently Active    Days of Exercise per Week: 1 day    Minutes of Exercise per Session: 60 min   Stress: No Stress Concern Present    Feeling of Stress : Not at all   Social Connections: Moderately Integrated    Frequency of Communication with Friends and Family: More than three times a week    Frequency of Social Gatherings with Friends and Family: Twice a week    Attends Anabaptism Services: 1 to 4 times per year    Active Member of [employer] or Organizations: No    Attends Club or Organization Meetings: Never    Marital Status:    Intimate Partner Violence: Not At Risk    Fear of Current or Ex-Partner: No    Emotionally Abused: No    Physically Abused: No    Sexually Abused: No     Presently lives with family  Patient is     Patient is currently employed     No Known Allergies      Current Outpatient Medications:     aspirin (ECOTRIN LOW STRENGTH) 81 mg EC tablet, TAKE 2 TABLETS (162 MG TOTAL) BY MOUTH DAILY (Patient not taking: Reported on 3/30/2021), Disp: 180 tablet, Rfl: 1    benzocaine-menthol-lanolin-aloe (DERMOPLAST) 20-0 5 % topical spray, Apply 1 application topically 4 (four) times a day as needed for irritation (Patient not taking: Reported on 3/30/2021), Disp: , Rfl: 0    docusate sodium (COLACE) 100 mg capsule, Take 1 capsule (100 mg total) by mouth 2 (two) times a day (Patient not taking: Reported on 3/30/2021), Disp: 10 capsule, Rfl: 0    hydrocortisone 1 % cream, Apply 1 application topically 4 (four) times a day as needed for irritation (Patient not taking: Reported on 3/30/2021), Disp: 30 g, Rfl: 0    ibuprofen (MOTRIN) 600 mg tablet, Take 1 tablet (600 mg total) by mouth every 6 (six) hours as needed for moderate pain (Patient not taking: Reported on 3/30/2021), Disp: 30 tablet, Rfl: 0    Prenatal Vit-Fe Fumarate-FA (PRENATAL 1+1 PO), Take by mouth (Patient not taking: Reported on 7/14/2021), Disp: , Rfl:     witch hazel-glycerin (TUCKS) topical pad, Apply 1 pad topically every 2 (two) hours as needed for irritation, hemorrhoids or elvira-anal irritation (Patient not taking: Reported on 3/30/2021), Disp: , Rfl: 0      Review of Systems  Constitutional :no fever, feels well, no tiredness, no recent weight gain or loss  ENT: no ear ache, no loss of hearing, no nosebleeds or nasal discharge, no sore throat or hoarseness  Cardiovascular: no complaints of slow or fast heart beat, no chest pain, no palpitations, no leg claudication or lower extremity edema  Respiratory: no complaints of shortness of shortness of breath, no MERINO  Breasts:no complaints of breast pain, breast lump, or nipple discharge  Gastrointestinal: no complaints of abdominal pain, constipation, nausea, vomiting, or diarrhea or bloody stools  Genitourinary : no complaints of dysuria, incontinence, pelvic pain, no dysmenorrhea, vaginal discharge or abnormal vaginal bleeding and as noted in HPI  Musculoskeletal: no complaints of arthralgia, no myalgia, no joint swelling or stiffness, no limb pain or swelling    Integumentary: no complaints of skin rash or lesion, itching or dry skin  Neurological: no complaints of headache, no confusion, no numbness or tingling, no dizziness or fainting    Objective      /73 (BP Location: Left arm, Patient Position: Sitting, Cuff Size: Adult)   Pulse 69   Ht 5' 6" (1 676 m)   Wt 113 kg (249 lb)   Breastfeeding Yes   BMI 40 19 kg/m²   General:   appears stated age, cooperative, alert normal mood and affect   Neck: normal, supple,trachea midline, no masses   Heart: regular rate and rhythm, S1, S2 normal, no murmur, click, rub or gallop   Lungs: clear to auscultation bilaterally   Breasts: normal appearance, no masses or tenderness, Inspection negative, No nipple retraction or dimpling, No nipple discharge or bleeding, No axillary or supraclavicular adenopathy, Normal to palpation without dominant masses, Taught monthly breast self examination   Abdomen: soft, non-tender, without masses or organomegaly   Vulva: normal female genitalia, Bartholin's, Urethra, [place] normal   Vagina: normal vagina, no discharge, exudate, lesion, or erythema   Urethra: normal   Cervix: Normal, no discharge  Nontender  Uterus: normal size, contour, position, consistency, mobility, non-tender and anteverted   Adnexa: no mass, fullness, tenderness   Lymphatic palpation of lymph nodes in neck, axilla, groin and/or other locations: no lymphadenopathy or masses noted   Skin normal skin turgor and no rashes     Psychiatric orientation to person, place, and time: normal  mood and affect: normal

## 2021-07-14 ENCOUNTER — ANNUAL EXAM (OUTPATIENT)
Dept: OBGYN CLINIC | Facility: CLINIC | Age: 36
End: 2021-07-14

## 2021-07-14 VITALS
HEIGHT: 66 IN | DIASTOLIC BLOOD PRESSURE: 73 MMHG | BODY MASS INDEX: 40.02 KG/M2 | HEART RATE: 69 BPM | WEIGHT: 249 LBS | SYSTOLIC BLOOD PRESSURE: 107 MMHG

## 2021-07-14 DIAGNOSIS — Z01.419 ENCOUNTER FOR GYNECOLOGICAL EXAMINATION WITHOUT ABNORMAL FINDING: Primary | ICD-10-CM

## 2021-07-14 PROBLEM — O99.213 MATERNAL MORBID OBESITY IN THIRD TRIMESTER, ANTEPARTUM (HCC): Status: RESOLVED | Noted: 2021-02-09 | Resolved: 2021-07-14

## 2021-07-14 PROBLEM — O99.213 OBESITY IN PREGNANCY, ANTEPARTUM, THIRD TRIMESTER: Status: RESOLVED | Noted: 2020-12-29 | Resolved: 2021-07-14

## 2021-07-14 PROBLEM — E66.01 MATERNAL MORBID OBESITY IN THIRD TRIMESTER, ANTEPARTUM (HCC): Status: RESOLVED | Noted: 2021-02-09 | Resolved: 2021-07-14

## 2021-07-14 PROCEDURE — T1015 CLINIC SERVICE: HCPCS | Performed by: NURSE PRACTITIONER

## 2021-07-14 PROCEDURE — 99395 PREV VISIT EST AGE 18-39: CPT | Performed by: NURSE PRACTITIONER

## 2021-07-14 NOTE — PATIENT INSTRUCTIONS
HPV (Human Papillomavirus)   WHAT YOU NEED TO KNOW:   What is human papillomavirus (HPV)? HPV is the most common infection spread by sexual contact  It can also be spread from a mother to her baby during delivery  HPV may cause oral and genital warts or tumors in your nose, mouth, throat, and lungs  HPV may also cause vaginal, penile, and anal cancers  You may not show symptoms of any of these conditions for several years after being exposed to HPV  What are the symptoms of HPV? · Painless warts    · Genital or anal discharge, bleeding, itching, or pain    · Pain when you urinate    How is HPV diagnosed? Your healthcare provider may use a vinegar liquid to help diagnose HPV genital warts  Women 27to 72years old can be checked for HPV during regular cervical cancer screenings  An HPV test checks for certain types of HPV that can cause changes in cervical cells  Without treatment, the changed cells can become cancer  An HPV test can be done every 5 years if the results show no infection  The test can be done with or without a Pap smear  A Pap smear checks for cancer or for abnormal cells that can become cancer  You may be tested for HPV if you are diagnosed with a mouth or throat cancer  How is HPV treated? HPV cannot be cured  Conditions that are caused by HPV can be treated  You will need to be monitored closely for these conditions  Ask your healthcare provider for more information about monitoring, conditions caused by HPV, and available treatments  How can HPV infection be prevented? · Ask about the HPV vaccine  The vaccine can help protect against HPV infection  Females and males can receive the vaccine  It is most effective if given before sexual activity begins  This allows the body to build almost complete protection against HPV before contact with the virus  The vaccine is usually given at 6or 15years of age but may be given as early as 5 years   The vaccine can be given through age 45     · Always use a condom during intercourse  A condom will not completely protect you from HPV infection, but it will help lower your risk  Use a new condom or latex barrier each time you have sex  This includes oral, vaginal, and anal sex  Make sure the condom fits and is put on correctly  Rubber latex sheets or dental dams can be used for oral sex  If you are allergic to latex, use a nonlatex product such as polyurethane  When should I call my doctor? · You have warts in your genital or anal area  · You have genital or anal discharge, bleeding, itching, or pain  · You have pain when you urinate  · You have questions or concerns about your condition or care  CARE AGREEMENT:   You have the right to help plan your care  Learn about your health condition and how it may be treated  Discuss treatment options with your healthcare providers to decide what care you want to receive  You always have the right to refuse treatment  The above information is an  only  It is not intended as medical advice for individual conditions or treatments  Talk to your doctor, nurse or pharmacist before following any medical regimen to see if it is safe and effective for you  © Copyright [address] Information is for End User's use only and may not be sold, redistributed or otherwise used for commercial purposes  All illustrations and images included in CareNotes® are the copyrighted property of [employer]  or [employer]  Polycystic Ovarian Syndrome   AMBULATORY CARE:   Polycystic ovarian syndrome (PCOS)  is a group of symptoms caused by a hormone disorder  With PCOS, your body produces too many hormones and your ovaries do not work correctly  Your ovaries have fluid-filled sacs with an immature egg in each one  These are called follicles  The follicles grow bigger and make your ovaries look like they have cysts in them   PCOS increases your risk for endometrial cancer and infertility  Common signs and symptoms include:  · Irregular or absent monthly periods     · Extreme hair growth on your face, chest, and back     · Acne, thinning hair or baldness on your scalp     · Weight gain, especially around your abdomen     · High blood sugar levels or high blood pressure    · Periods of extreme sadness and extreme happiness    · Difficulty getting pregnant    · Darkening of the skin around your neck creases, groin, and under your breasts    · Skin tags in your armpits, or on our neck    Call your doctor if:  · Your symptoms get worse  · You have questions or concerns about your condition or care  Treatment for PCOS  may include any of the following:  · Medicines  may be given to control your blood sugar  You may need medicines to decrease male hormones, and increase female hormones  These medicines may also improve acne, baldness and hair growth you do not want  You may also need medicine to help you ovulate if you want to get pregnant  · Lifestyle changes:      ? Manage other health conditions  PCOS increases your risk for diabetes, heart disease, and high blood pressure  Your healthcare provider may send you to specialists that teach you how to manage these conditions  ? Maintain a healthy weight  Ask your healthcare provider how much you should weigh  Ask him or her to help you create a weight loss plan if you are overweight  Weight loss can help reduce the symptoms of PCOS  You and your healthcare provider can set manageable weight loss goals  ? Exercise as directed  Exercise can help keep your blood sugar level steady, lower your risk for heart disease, and help you lose weight  Exercise for at least 150 minutes every week  Spread this amount of exercise over at least 3 days in the week  Do not skip exercise more than 2 days in a row  Include muscle strengthening activities 2 to 3 days each week  Examples of exercise include walking or swimming   Do not sit for longer than 30 minutes at a time  Work with your healthcare provider to create an exercise plan that is right for you      ? Eat a variety of healthy foods  Healthy foods include fruits, vegetables, whole-grain breads, low-fat dairy products, beans, lean meats, and fish  A dietitian may help you plan meals to help manage your other health conditions  Follow up with your doctor as directed: You may need to return for more tests or to check if the treatment is working  Write down your questions so you remember to ask them during your visits  © Copyright [address] Information is for End User's use only and may not be sold, redistributed or otherwise used for commercial purposes  All illustrations and images included in CareNotes® are the copyrighted property of [employer] M [employer] Inc  or [facility]   The above information is an  only  It is not intended as medical advice for individual conditions or treatments  Talk to your doctor, nurse or pharmacist before following any medical regimen to see if it is safe and effective for you  Vaccine Decision Making  Im pregnant  Should I get a COVID vaccine? The information here is about the [place] and Moderna COVID-19 vaccines  These are also called mRNA vaccines  Reference numbers written like this (#) correspond to the footnotes at the end of this section  For most people, getting the COVID vaccine as soon as possible is the safest choice  However, these vaccines have not been tested in pregnant and breastfeeding women yet  The information below will help you make an informed choice about whether to get an mRNA COVID vaccine while you are pregnant or trying to get pregnant  Your options:   Get a COVID vaccine as soon as it is available    Wait for more information about the vaccines in pregnancy    What are the benefits of getting an mRNA COVID Vaccine? 1  COVID is dangerous  It is more dangerous for pregnant women     COVID patients who are pregnant are 5 times more likely to end up in the intensive care unit (ICU) or on a ventilator than COVID patients who are not pregnant  (#1)    birth may be more common for pregnant women with severe COVID  (#2)   Pregnant women are more likely to die of COVID than non-pregnant women with COVID who are the same age  (#3,4)    2  The mRNA COVID vaccines prevent about 95% of COVID infections   As COVID infections go up in our communities, your risk of getting COVID goes up too   Getting a vaccine will prevent you from getting COVID and may help keep you from giving COVID to people around you, like your family  3  The mRNA COVID vaccines cannot give you COVID   These vaccines have no live virus  (#5)   These vaccines do NOT contain ingredients that are known to be harmful to pregnant women or to the fetus   Many vaccines are routinely given in pregnancy and are safe (for example: tetanus, diphtheria, and flu)  More details about how these vaccines work can be found below in the section "More information about mRNA COVID vaccines"  What are the risks of getting an mRNA COVID vaccine? 1  These COVID vaccines have not yet been tested in pregnant people   These vaccines were tested in over 40,000 people, and there were no serious side effects related to the vaccine   We do not know if the vaccines work as well in pregnant people as they did in non-pregnant people   We do not know whether there are unique downsides in pregnancy, like different side effects or an increased risk of miscarriage or fetal abnormalities   The Moderna vaccine was tested in female rats to look at its effects on pregnancy  No significant negative effects were found on female fertility or fetal development   Some women became pregnant during the vaccine studies  Eighteen of these women were in the vaccine group, and two months later none had miscarried   There were seventeen women in the placebo group who became pregnant, and two months later two of them had had miscarriages  (In general, 10-20% of pregnancies end in miscarriage)   Because these studies are still ongoing, we dont know how the rest of the pregnancy went for these women  2  People getting the vaccine will probably have some side effects   Many people had symptoms caused by their immune systems normal response to the vaccine  The most common side effects were:(#6)   injection site reactions like sore arm (~84%)   fatigue (~62%)   headache (~55%)   muscle pain (~38%)   chills (~32%)   joint pain (~24%)   fever (~14%)   Of 100 people who get the vaccine, 1 will get a high fever (over 102°F)  A persistent high fever during the first trimester might increase the risk of fetal abnormalities or miscarriage  The CDC recommends using Tylenol (acetaminophen) during pregnancy if you have a high fever  Another option is to delay your COVID vaccine until after the first trimester  What do the experts recommend? Because COVID is dangerous and easily spread, the CDC says that the mRNA vaccines for COVID-19 are recommended for adults  (#7)  However, because there are no studies of pregnant women yet, there are no clear recommendations for pregnant women  This is standard for a new drug and is not due to any particular concern with this vaccine  The Society for Maternal-Fetal Medicine strongly recommends that pregnant individuals have access to COVID vaccines  They recommend that each person talk to their doctor or midwife about their own personal choice  (#8)    The [address] and Gynecologists recommends that the COVID vaccine should not be withheld from pregnant individuals  (#9)    What else should I think about to help me decide? Make sure you understand as much as you can about COVID and about the vaccine  Ask a trusted source, like your midwife or doctor   Continue reading below for more information about the vaccine  Think about your own personal risk  Look at the columns below and think about your risk of getting COVID (left)  Think about your safety - are you able to stay safe (right)? The risks of getting sick from [place]  are higher if If you are not at higher risk for COVID  and   ?? You have contact with people  outside your home ? ? You are always able to wear a mask   ? ? You are 28years old or older ? ? You and the people you live with  can socially distance from others for  your whole pregnancy   ? ? You are overweight ? ? Your community does NOT have  high or increasing COVID cases   ? ? You have other medical problems  like diabetes, high blood pressure,  or heart disease ? ? You think the vaccine itself will make  you very nervous (you are more  worried about the unknown risks  than about getting COVID)   ? ? You are a smoker ? ? You have had a severe allergic  reaction to a vaccine   ? ? You are a racial or ethnic minority, or your community has a high rate of COVID infections    ? ? You are a healthcare worker(#10)    If you are at a higher risk of getting  COVID, it probably makes sense to get  the vaccine   it might make sense for you to wait  for more information  What about breastfeeding? The Society for [address] [place] [address] of Breastfeeding Medicine report that there is no reason to believe that the vaccine affects the safety of breastmilk  8,11 The vaccine does not contain the virus, so there is no risk of infecting your baby  Because mRNA is fragile, it is very unlikely that any part of the vaccine gets into breastmilk  When we have an infection or get a vaccine, our bodies make antibodies to fight the infection  Antibodies can pass into the breastmilk and then to the baby - and may help prevent infections  Summary  1  COVID seems to cause more harm in pregnant women than in women of the same age who are not pregnant    2  The risks of getting an mRNA COVID vaccine during pregnancy are thought to be small but are not totally known  3  You should consider your own personal risk of getting COVID  If your personal risk is high, or there are many cases of COVID in your community, it probably makes sense for you to get a vaccine while pregnant  4  Whether to get a COVID vaccine during pregnancy is your choice  What do pregnant doctors think? 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, so for me the choice is clear, I intend to be first in line as soon as they will let me have one " (Pregnant Emergency Department Doctor)    "I am a little nervous about getting something that hasnt been tested in pregnant patients  Early pregnancy is a nerve-wracking time, even without the unknown of a new vaccine  So, I went over the risks and benefits of getting or not getting it as a front- - with myself, my partner, and my doctors  We ended up deciding I should get the vaccine " (Pregnant Emergency Department Doctor)    "Im 34 weeks and I'm going to try to get vaccinated after delivery, but during pregnancy I'm holding out  Pregnant women were excluded from the studies and, in the meantime, I don't see [place] patients at work so I feel like my exposure will be low during this second wave " (Pregnant physician)    "I am still breastfeeding my baby, and I think the risk of exposing my infant and other children and partner to [place] is far greater than any theoretical risk this novel vaccine may have  Ive decided to get vaccinated whenever it becomes available " (Breastfeeding OB/GYN Doctor)      Do you have more questions? Call your doctor or midwife to talk about your own personal decision  Thoughts about this tool? Was this decision aid helpful? Please take a moment to give us feedback about this decision aid at [email or web address] or by scanning the QR code below  We need your help! Tell the [facility] about your experience with the vaccine  If you decide to get the vaccine, you will get a V-safe information sheet with instructions about the V-safe website and andrea  Consider registering so we can better  women in the future  More information about mRNA COVID Vaccines  How do mRNA COVID vaccines work?  The Pfizer and Moderna COVID vaccines are mRNA vaccines (messenger RNA)   mRNA is not new - our bodies are full of it  mRNA vaccines have been studied for the past two decades   mRNA vaccines mimic how viruses work  The mRNA is like a recipe card that goes into your body and makes one recipe for a brief time  The recipe is for a small part of the virus (the spike protein)   When this spike protein is released from cells, the body recognizes it as foreign and the immune system responds  This immune response causes the side effect symptoms (like aches and fever) but leads to improved immunity   mRNA breaks down quickly, so it only lasts a brief time   This is also how the other viruses like a cold virus work - viruses use our body and cells to make their proteins  Then our immune system attacks those proteins to keep us healthy   There is no way for the vaccine to give people COVID  (#5)    What did the research show? The Pfizer and Moderna mRNA vaccine trials each had over 30,000 people (including those who got placebo) and showed that the vaccine lowers a persons chance of getting COVID and severe COVID  In each study, over 15,000 people got the vaccine and over 15,000 people got a saline injection (placebo)   After one dose, the vaccine appears to be 50% effective  After 2 doses, both vaccines are about 95% effective   In other words, for every 100 people who got COVID in the placebo group, only 5 people got COVID in the mRNA vaccine groups   Severe cases of COVID were also reduced in both mRNA vaccine groups     There were no serious safety concerns  Intended Use   This decision aid is intended for use by pregnant women (and women planning on becoming pregnant) who are considering getting the COVID-19 vaccine, as well as their  healthcare providers, and their friends and family  It was created by the Shared Decision-Making: COVID Vaccination in Pregnancy working group at the [facility]  This group consists of experts in the fields of OB/GYN, Maternal-Fetal Medicine, Shared Decision-Making and risk communication, Emergency Medicine, and current COVID-19 research  Questions should be directed to Dr [email or web address]  Feedback regarding the utility of this decision aid can be directed through the survey (see link above)  This decision aid can be reproduced and distributed without additional permission  [place] and [place] versions available at [email or web address] org/COVIDvacPregnancy  Updated 2020  1  Zahira RIVERA, et al  Pregnant women with severe or critical COVID-19 have increased composite morbidity compared to  non-pregnant matched controls  Am J Obstet 2020 doi: 10 1016/j ajog  11 022  2  Shilpa ELLIOTT, et al  Pregnancy outcomes among women with and without severe acute respiratory syndrome coronavirus  2 infection  [facility] 2020 3(11):[id number]  3  G[employer] working group on COVID-19  Maternal and  Outcomes of Pregnancy Women with SARScoV-  2 infection  Ultrasound Obstet Gynecol  2020  doi: 10 1002/uog  75158  4  Centers for Disease Control and Prevention  Update: Characteristics of Symptomatic Women of Reproductive Age with  Laboratory-Confirmed SARS-CoV-2 Infection by Pregnancy Status -- [facility], -October 3, 2020  2020:1-7   5  Sharon SLADE  COVID-19 and mRNA Vaccines--First Large Test for a New Approach  VINNY  2020;324(12):8894-9434  doi:10 1001/vinny  [id number] [id number]  6  [facility]  7  [employer] com br (Accessed Dec14, 2020)  8  SM statement on COVID vaccination in pregnancy: [email or web address]  [facility] [email or web address]-[email or web address]-maternal-fetalmedicine-  [facility]-statement-sars-cov-2-vaccination-in-pregnancy  9  [employer] com au-  Lactating-Patients-Against-COVID-19 (Accessed December 14, 2020)  10  Ulysses Party, et al  Occupation and risk of severe COVID-19: prospective cohort study of  [facility] participants  Occupational and Environmental Medicine Published Online First: 09 December 2020   doi: 10 1136/[email or web address]-[id number]  11  [email or web address]-in-lactation

## 2021-12-22 ENCOUNTER — TELEPHONE (OUTPATIENT)
Dept: FAMILY MEDICINE CLINIC | Facility: CLINIC | Age: 36
End: 2021-12-22

## 2021-12-22 DIAGNOSIS — R05.9 COUGH: Primary | ICD-10-CM

## 2021-12-22 PROCEDURE — U0003 INFECTIOUS AGENT DETECTION BY NUCLEIC ACID (DNA OR RNA); SEVERE ACUTE RESPIRATORY SYNDROME CORONAVIRUS 2 (SARS-COV-2) (CORONAVIRUS DISEASE [COVID-19]), AMPLIFIED PROBE TECHNIQUE, MAKING USE OF HIGH THROUGHPUT TECHNOLOGIES AS DESCRIBED BY CMS-2020-01-R: HCPCS | Performed by: FAMILY MEDICINE

## 2021-12-22 PROCEDURE — U0005 INFEC AGEN DETEC AMPLI PROBE: HCPCS | Performed by: FAMILY MEDICINE

## 2022-06-09 ENCOUNTER — TELEPHONE (OUTPATIENT)
Dept: FAMILY MEDICINE CLINIC | Facility: CLINIC | Age: 37
End: 2022-06-09

## 2022-06-09 NOTE — TELEPHONE ENCOUNTER
Pt was Covid positve on 5/31  She is getting her results faxed over to us  She is just asking for a letter stating that she just getting over covid, and that she can travel  She has no symptoms  PER CDC :    f you recently recovered from COVID-19, you may instead travel with documentation of recovery from COVID-19 (i e , your positive COVID-19 viral test result on a sample taken no more than 90 days before the flights departure from a foreign country and a letter from a licensed healthcare provider or a public health official stating that you were cleared to travel)

## 2022-08-04 ENCOUNTER — TELEPHONE (OUTPATIENT)
Dept: OBGYN CLINIC | Facility: CLINIC | Age: 37
End: 2022-08-04

## 2022-08-04 NOTE — TELEPHONE ENCOUNTER
Attempt to schedule annual exam that is overdue  Unable to leave message  [employer] message sent

## 2022-08-31 ENCOUNTER — VBI (OUTPATIENT)
Dept: ADMINISTRATIVE | Facility: OTHER | Age: 37
End: 2022-08-31

## 2022-11-11 ENCOUNTER — TELEPHONE (OUTPATIENT)
Dept: FAMILY MEDICINE CLINIC | Facility: CLINIC | Age: 37
End: 2022-11-11

## 2022-11-11 NOTE — TELEPHONE ENCOUNTER
No appetite, and day 3 of diarrhea  She has used OTC not helping  Please advise what she can take?     Pharmacy - [address]    [place]    Patient ph # [phone]

## 2022-11-11 NOTE — TELEPHONE ENCOUNTER
Likely  viral  gastroenteritis otc immodium, CHAPARRO diet ,avoid milk products 3-5 days  keep hydrated  ;to ER with any fever abdominal pain or bloody diarrhea

## 2023-01-30 ENCOUNTER — ANNUAL EXAM (OUTPATIENT)
Dept: OBGYN CLINIC | Facility: CLINIC | Age: 38
End: 2023-01-30

## 2023-01-30 VITALS
BODY MASS INDEX: 39.15 KG/M2 | DIASTOLIC BLOOD PRESSURE: 84 MMHG | WEIGHT: 243.6 LBS | HEIGHT: 66 IN | HEART RATE: 80 BPM | SYSTOLIC BLOOD PRESSURE: 114 MMHG

## 2023-01-30 DIAGNOSIS — Z01.419 ENCOUNTER FOR GYNECOLOGICAL EXAMINATION WITHOUT ABNORMAL FINDING: Primary | ICD-10-CM

## 2023-01-30 DIAGNOSIS — Z72.51 HIGH RISK SEXUAL BEHAVIOR, UNSPECIFIED TYPE: ICD-10-CM

## 2023-01-30 DIAGNOSIS — E28.2 PCOS (POLYCYSTIC OVARIAN SYNDROME): ICD-10-CM

## 2023-01-30 DIAGNOSIS — Z12.31 ENCOUNTER FOR SCREENING MAMMOGRAM FOR MALIGNANT NEOPLASM OF BREAST: ICD-10-CM

## 2023-01-30 NOTE — PROGRESS NOTES
ASSESSMENT & PLAN: Donal Graham is a 42 y o  [id number] with normal gynecologic exam     1   Routine well woman exam done today  2  Pap and HPV:  The patient's last pap and hpv was 1/23/2020    It was normal     Pap and cotesting was not done today  Current ASCCP Guidelines reviewed  Next due 1/2025  3  The following were reviewed in today's visit: breast self exam, STD testing, adequate intake of calcium and vitamin D, exercise and healthy diet  Desires testing fot GC and cT today only  4  Gardisil vaccine in women up to age 39 information was provided  5  RTO if menses are not regulated with hx of PCOS  Reviewed weight loss can assist in regulation of her periods  BMI Counseling: Body mass index is 39 32 kg/m²  The BMI is above normal  Nutrition recommendations include 3-5 servings of fruits/vegetables daily, moderation in carbohydrate intake and reducing intake of cholesterol  Exercise recommendations include exercising 3-5 times per week  Depression Screening Follow-up Plan: Patient's depression screening was negative with a PHQ-2 score of 0   Their PHQ-9 score was 1  Clinically patient does not have depression  No treatment is required  CC:  Annual Gynecologic Examination    HPI: Donal Graham is a 42 y o  [id number] who presents for annual gynecologic examination  She is breastfeeding her soon to be 3year old and plans to wean by her 34 th birthday  She recently lost 10 lbs in a month with diet only, plans to lose 80 lbs total with diet and exercise  Hx of PCOS and had 1st menses 1/12/23 x 7 days  Recommend to monitor her menses and importance of menses at least every 3 months, she declines contraception at this time  Health Maintenance:    She wears her seatbelt routinely  She does perform regular monthly self breast exams  She feels safe at home       Past Medical History:   Diagnosis Date   • Chlamydia    • Maternal morbid obesity in third trimester, antepartum ([facility] [address] ) 2021   • Obesity in pregnancy, antepartum, third trimester 2020   • Varicella        Past Surgical History:   Procedure Laterality Date   • VAGINAL DELIVERY     • WISDOM TOOTH EXTRACTION         Past OB/Gyn History:  OB History        3    Para   3    Term   3            AB        Living   3       SAB        IAB        Ectopic        Multiple   0    Live Births   3               Pt has menstrual issues  Just started, has been nursing   History of sexually transmitted infection: Yes  Chlamydia  History of abnormal pap smears: No      Patient is currently sexually active  heterosexual   The current method of family planning is none      Family History   Problem Relation Age of Onset   • Hypertension Mother    • Hypertension Father    • Hyperlipidemia Father    • No Known Problems Sister    • No Known Problems Son    • No Known Problems Son    • No Known Problems Son    • Heart failure Maternal Grandfather    • Pneumonia Paternal Grandmother    • Heart failure Paternal Grandfather    • Breast cancer Paternal Aunt         Per pt dx in her 52's       Social History:  Social History     Socioeconomic History   • Marital status: /Civil Union     Spouse name: Will   • Number of children: 2   • Years of education: 15   • Highest education level: High school graduate   Occupational History   • Not on file   Tobacco Use   • Smoking status: Former     Packs/day: 0 50     Years: 10 00     Pack years: 5 00     Types: Cigarettes     Quit date:      Years since quittin 0   • Smokeless tobacco: Never   Vaping Use   • Vaping Use: Never used   Substance and Sexual Activity   • Alcohol use: Not Currently   • Drug use: Never   • Sexual activity: Yes     Partners: Male     Birth control/protection: None   Other Topics Concern   • Not on file   Social History Narrative   • Not on file     Social Determinants of Health     Financial Resource Strain: Low Risk    • Difficulty of Paying Living Expenses: Not hard at all   Food Insecurity: No Food Insecurity   • Worried About [address] in the Last Year: Never true   • Ran Out of Food in the Last Year: Never true   Transportation Needs: No Transportation Needs   • Lack of Transportation (Medical): No   • Lack of Transportation (Non-Medical): No   Physical Activity: Not on file   Stress: Not on file   Social Connections: Not on file   Intimate Partner Violence: Not At Risk   • Fear of Current or Ex-Partner: No   • Emotionally Abused: No   • Physically Abused: No   • Sexually Abused: No   Housing Stability: Unknown   • Unable to Pay for Housing in the Last Year: Not on file   • Number of Places Lived in the Last Year: 1   • Unstable Housing in the Last Year: No     Presently lives with family  Patient is   Patient is currently employed     No Known Allergies      Current Outpatient Medications:   •  Prenatal Vit-Fe Fumarate-FA (PRENATAL 1+1 PO), Take by mouth , Disp: , Rfl:       Review of Systems  Constitutional :no fever, feels well, no tiredness, no recent weight gain or loss  ENT: no ear ache, no loss of hearing, no nosebleeds or nasal discharge, no sore throat or hoarseness  Cardiovascular: no complaints of slow or fast heart beat, no chest pain, no palpitations, no leg claudication or lower extremity edema  Respiratory: no complaints of shortness of shortness of breath, no MERINO  Breasts:no complaints of breast pain, breast lump, or nipple discharge  Gastrointestinal: no complaints of abdominal pain, constipation, nausea, vomiting, or diarrhea or bloody stools  Genitourinary : no complaints of dysuria, incontinence, pelvic pain, no dysmenorrhea, vaginal discharge or abnormal vaginal bleeding and as noted in HPI  Musculoskeletal: no complaints of arthralgia, no myalgia, no joint swelling or stiffness, no limb pain or swelling    Integumentary: no complaints of skin rash or lesion, itching or dry skin  Neurological: no complaints of headache, no confusion, no numbness or tingling, no dizziness or fainting    Objective      /84   Pulse 80   Ht 5' 6" (1 676 m)   Wt 110 kg (243 lb 9 6 oz)   LMP 01/12/2023   BMI 39 32 kg/m²   General:   appears stated age, cooperative, alert normal mood and affect   Neck: normal, supple,trachea midline, no masses   Heart: regular rate and rhythm, S1, S2 normal, no murmur, click, rub or gallop   Lungs: clear to auscultation bilaterally   Breasts: normal appearance, no masses or tenderness, Inspection negative, No nipple retraction or dimpling, No nipple discharge or bleeding, No axillary or supraclavicular adenopathy, Normal to palpation without dominant masses, Taught monthly breast self examination   Abdomen: soft, non-tender, without masses or organomegaly   Vulva: normal female genitalia, Bartholin's, Urethra, [place] normal   Vagina: normal vagina, no discharge, exudate, lesion, or erythema   Urethra: normal   Cervix: Normal, no discharge  GCC done  Uterus: normal size, contour, position, consistency, mobility, non-tender   Adnexa: normal adnexa and no mass, fullness, tenderness   Lymphatic palpation of lymph nodes in neck, axilla, groin and/or other locations: no lymphadenopathy or masses noted   Skin normal skin turgor and no rashes     Psychiatric orientation to person, place, and time: normal  mood and affect: normal

## 2023-01-31 LAB
C TRACH DNA SPEC QL NAA+PROBE: NEGATIVE
N GONORRHOEA DNA SPEC QL NAA+PROBE: NEGATIVE

## 2023-04-10 PROBLEM — Z00.00 ANNUAL PHYSICAL EXAM: Status: ACTIVE | Noted: 2023-04-10

## 2023-04-10 PROBLEM — E66.812 CLASS 2 OBESITY DUE TO EXCESS CALORIES WITHOUT SERIOUS COMORBIDITY WITH BODY MASS INDEX (BMI) OF 39.0 TO 39.9 IN ADULT: Status: ACTIVE | Noted: 2023-04-10

## 2023-04-10 PROBLEM — L81.9 HYPOPIGMENTATION: Status: ACTIVE | Noted: 2023-04-10

## 2023-04-10 PROBLEM — R21 RASH: Status: ACTIVE | Noted: 2023-04-10

## 2023-04-10 PROBLEM — E66.09 CLASS 2 OBESITY DUE TO EXCESS CALORIES WITHOUT SERIOUS COMORBIDITY WITH BODY MASS INDEX (BMI) OF 39.0 TO 39.9 IN ADULT: Status: ACTIVE | Noted: 2023-04-10

## 2023-08-22 ENCOUNTER — NURSE TRIAGE (OUTPATIENT)
Dept: PHYSICAL THERAPY | Facility: OTHER | Age: 38
End: 2023-08-22

## 2023-08-22 ENCOUNTER — APPOINTMENT (EMERGENCY)
Dept: RADIOLOGY | Facility: HOSPITAL | Age: 38
End: 2023-08-22
Payer: COMMERCIAL

## 2023-08-22 ENCOUNTER — TELEPHONE (OUTPATIENT)
Dept: FAMILY MEDICINE CLINIC | Facility: CLINIC | Age: 38
End: 2023-08-22

## 2023-08-22 ENCOUNTER — HOSPITAL ENCOUNTER (EMERGENCY)
Facility: HOSPITAL | Age: 38
Discharge: HOME | End: 2023-08-22
Attending: EMERGENCY MEDICINE
Payer: COMMERCIAL

## 2023-08-22 VITALS
RESPIRATION RATE: 18 BRPM | SYSTOLIC BLOOD PRESSURE: 121 MMHG | HEART RATE: 61 BPM | OXYGEN SATURATION: 99 % | WEIGHT: 215 LBS | DIASTOLIC BLOOD PRESSURE: 85 MMHG | HEIGHT: 66 IN | BODY MASS INDEX: 34.55 KG/M2 | TEMPERATURE: 98.2 F
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
## 2023-08-22 DIAGNOSIS — S93.602S FOOT SPRAIN, LEFT, SEQUELA: ICD-10-CM

## 2023-08-22 DIAGNOSIS — S29.012A REPETITIVE STRAIN INJURY OF MID BACK, INITIAL ENCOUNTER: ICD-10-CM

## 2023-08-22 DIAGNOSIS — M54.9 BACK PAIN, UNSPECIFIED BACK LOCATION, UNSPECIFIED BACK PAIN LATERALITY, UNSPECIFIED CHRONICITY: ICD-10-CM

## 2023-08-22 DIAGNOSIS — R51.9 HEADACHE: ICD-10-CM

## 2023-08-22 DIAGNOSIS — S80.01XA CONTUSION OF RIGHT KNEE, INITIAL ENCOUNTER: ICD-10-CM

## 2023-08-22 DIAGNOSIS — M54.50 ACUTE BILATERAL LOW BACK PAIN WITHOUT SCIATICA: Primary | ICD-10-CM

## 2023-08-22 DIAGNOSIS — M54.2 NECK PAIN: Primary | ICD-10-CM
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
## 2023-08-22 DIAGNOSIS — M54.2 ACUTE NECK PAIN: ICD-10-CM

## 2023-08-22 PROCEDURE — 73630 X-RAY EXAM OF FOOT: CPT

## 2023-08-22 PROCEDURE — 99283 EMERGENCY DEPT VISIT LOW MDM: CPT

## 2023-08-22 PROCEDURE — 73564 X-RAY EXAM KNEE 4 OR MORE: CPT

## 2023-08-22 RX ORDER — IBUPROFEN 400 MG/1
400 TABLET ORAL ONCE
Status: COMPLETED | OUTPATIENT
Start: 2023-08-22 | End: 2023-08-22

## 2023-08-22 RX ADMIN — IBUPROFEN 400 MG: 400 TABLET, FILM COATED ORAL at 15:03

## 2023-08-22 NOTE — TELEPHONE ENCOUNTER
Does she have to go through work comp?  Should go to ER if all of these hurt her since ortho will not see her  this soon

## 2023-08-22 NOTE — ED PROVIDER NOTES
History  Chief Complaint   Patient presents with   • Fall     Pt reports yesterday she slipped on yogurt at [facility] and c/o pain to her right knee, left ankle, neck, lower back, and head. Pt did not take anything for pain. Pt denies LOC or head strike. 34 yr female  walking in local store yesterday slipped on yogurt and fell forward from feet onto both knees- since c/o r lower knee pain - left alteral foot pain - headache and mid back pain but no direct head or back injury - no cp/abd pain       History provided by:  Patient   used: No    Fall  Mechanism of injury: fall    Associated symptoms: back pain and headaches    Associated symptoms: no neck pain and no seizures        Prior to Admission Medications   Prescriptions Last Dose Informant Patient Reported? Taking? Prenatal Vit-Fe Fumarate-FA (PRENATAL 1+1 PO)  Self Yes No   Sig: Take by mouth    Patient not taking: Reported on 4/10/2023      Facility-Administered Medications: None       Past Medical History:   Diagnosis Date   • Chlamydia    • Maternal morbid obesity in third trimester, antepartum ([address]) 2/9/2021   • Obesity in pregnancy, antepartum, third trimester 12/29/2020   • Varicella        Past Surgical History:   Procedure Laterality Date   • VAGINAL DELIVERY     • WISDOM TOOTH EXTRACTION         Family History   Problem Relation Age of Onset   • Hypertension Mother    • Hypertension Father    • Hyperlipidemia Father    • No Known Problems Sister    • No Known Problems Son    • No Known Problems Son    • No Known Problems Son    • Heart failure Maternal Grandfather    • Pneumonia Paternal Grandmother    • Heart failure Paternal Grandfather    • Breast cancer Paternal Aunt         Per pt dx in her 52's     I have reviewed and agree with the history as documented.     E-Cigarette/Vaping   • E-Cigarette Use Never User      E-Cigarette/Vaping Substances   • Nicotine No    • THC No    • CBD No    • Flavoring No    • Other No    • Unknown No      Social History     Tobacco Use   • Smoking status: Former     Packs/day: 0.50     Years: 10.00     Total pack years: 5.00     Types: Cigarettes     Quit date:      Years since quittin.6   • Smokeless tobacco: Never   Vaping Use   • Vaping Use: Never used   Substance Use Topics   • Alcohol use: Not Currently   • Drug use: Never       Review of Systems   Constitutional: Negative. HENT: Negative. Eyes: Negative. Respiratory: Negative. Cardiovascular: Negative. Gastrointestinal: Negative. Endocrine: Negative. Genitourinary: Negative. Musculoskeletal: Positive for back pain. Negative for arthralgias, gait problem, joint swelling, myalgias, neck pain and neck stiffness. R lower anterior knee- tibial pain- left alteral foot pain injuries   Skin: Negative. Allergic/Immunologic: Negative. Neurological: Positive for headaches. Negative for dizziness, tremors, seizures, syncope, facial asymmetry, speech difficulty, weakness, light-headedness and numbness. Hematological: Negative. Psychiatric/Behavioral: Negative. All other systems reviewed and are negative. Physical Exam  Physical Exam  Vitals and nursing note reviewed. Constitutional:       General: She is not in acute distress. Appearance: Normal appearance. She is not ill-appearing, toxic-appearing or diaphoretic. Comments: avss-- pulse ox 99 % on ra- interpretation is normal-  No intervention- well appearing in nad    HENT:      Head: Normocephalic and atraumatic. Comments: No scalp tenderness/contusion/ heamtoma     Nose: Nose normal.      Mouth/Throat:      Mouth: Mucous membranes are moist.   Eyes:      General: No scleral icterus. Right eye: No discharge. Left eye: No discharge. Extraocular Movements: Extraocular movements intact. Conjunctiva/sclera: Conjunctivae normal.      Pupils: Pupils are equal, round, and reactive to light.    Neck:      Vascular: No carotid bruit. Comments: No pmt c/t/l/s spine   Cardiovascular:      Rate and Rhythm: Normal rate and regular rhythm. Pulses: Normal pulses. Heart sounds: Normal heart sounds. No murmur heard. No friction rub. No gallop. Pulmonary:      Effort: Pulmonary effort is normal. No respiratory distress. Breath sounds: Normal breath sounds. No stridor. No wheezing, rhonchi or rales. Chest:      Chest wall: No tenderness. Abdominal:      General: Bowel sounds are normal. There is no distension. Palpations: Abdomen is soft. There is no mass. Tenderness: There is no abdominal tenderness. There is no right CVA tenderness, left CVA tenderness, guarding or rebound. Hernia: No hernia is present. Comments: Soft nt/nd- no hsm - no cva tenderness- no ascites- no peritoneal signs    Musculoskeletal:         General: Tenderness and signs of injury present. No swelling or deformity. Normal range of motion. Cervical back: Normal range of motion and neck supple. No rigidity or tenderness. Right lower leg: No edema. Left lower leg: No edema. Comments: rle- upper ant tibial tenderness with area of ecchymosis- normal flex/ext at knee- no patellar /fib head tenderness- no effusion- no lig laxity - left foot- only - dorsal lateral 5th mt tenderness with overlying small area of ecchymosis- - r ankle- nt- normal r ankle achilles tendon function    Lymphadenopathy:      Cervical: No cervical adenopathy. Skin:     General: Skin is warm. Capillary Refill: Capillary refill takes less than 2 seconds. Coloration: Skin is not jaundiced or pale. Findings: No bruising, erythema, lesion or rash. Neurological:      General: No focal deficit present. Mental Status: She is alert and oriented to person, place, and time. Mental status is at baseline. Cranial Nerves: No cranial nerve deficit. Sensory: No sensory deficit. Motor: No weakness. Coordination: Coordination normal.      Gait: Gait normal.      Comments: Normal non focal neuro exam    Psychiatric:         Mood and Affect: Mood normal.         Behavior: Behavior normal.         Thought Content: Thought content normal.         Judgment: Judgment normal.         Vital Signs  ED Triage Vitals [08/22/23 1459]   Temperature Pulse Respirations Blood Pressure SpO2   98.2 °F (36.8 °C) 61 18 121/85 99 %      Temp Source Heart Rate Source Patient Position - Orthostatic VS BP Location FiO2 (%)   Oral Monitor Sitting Right arm --      Pain Score       8           Vitals:    08/22/23 1459   BP: 121/85   Pulse: 61   Patient Position - Orthostatic VS: Sitting         Visual Acuity      ED Medications  Medications   ibuprofen (MOTRIN) tablet 400 mg (400 mg Oral Given 8/22/23 1503)       Diagnostic Studies  Results Reviewed     None                 XR knee 4+ views Right injury    (Results Pending)   XR foot 3+ views LEFT    (Results Pending)              Procedures  Procedures         ED Course  ED Course as of 08/22/23 2105   Tue Aug 22, 2023   1602 R knee xray - no fx    1603 Left foot xray - no fx                                              MDM    Disposition  Final diagnoses:   None     ED Disposition     None      Follow-up Information    None         Patient's Medications   Discharge Prescriptions    No medications on file       No discharge procedures on file.     PDMP Review     None          ED Provider  Electronically Signed by           Nayla Alcazar MD  08/22/23 2105

## 2023-08-22 NOTE — TELEPHONE ENCOUNTER
Call transferred by Ortho. Additional Information  • Negative: Is this related to a work injury? • Negative: Is this related to an MVA? • Negative: Are you currently recieving homecare services? Background - Initial Assessment  Clinical complaint: B/L Neck and Lower Back Pain. Pain since yesterday after patient fell at [employer] (she does not work at [employer] , [address] claim open) Patient does have a  involved. Patient states pain is constant, worse with movement. No radiation, no numbness or tingling. Not seeing a Dr for this pain, spoke with PCP today and referred patient to Ortho. She has an appt with Ortho this Thursday. Patient described neck pain as aching, back pain as throbbing. Date of onset: 08/21/2023  Frequency of pain: constant  Quality of pain: aching and throbbing.     Protocols used: SL AMB COMPREHENSIVE SPINE PROGRAM PROTOCOL

## 2023-08-22 NOTE — TELEPHONE ENCOUNTER
Additional Information  • Negative: Has the patient had unexplained weight loss? • Negative: Does the patient have a fever? • Negative: Is the patient experiencing blood in sputum? • Negative: Is the patient experiencing urine retention? • Negative: Is the patient experiencing acute drop foot or paralysis? • Negative: Has the patient experienced major trauma? (fall from height, high speed collision, direct blow to spine) and is also experiencing nausea, light-headedness, or loss of consciousness? • Negative: Is this a chronic condition? Protocols used: SL AMB COMPREHENSIVE SPINE PROGRAM PROTOCOL    This RN did review in detail the Comprehensive Spine Program and what we can provide for their back and neck pain. Patient is agreeable to being triaged by this RN and would like to proceed with Physical Therapy. Referral was placed for Physical Therapy at the [facility] site. Patients information was sent to the  to make evaluation appointment. Patient made aware that the PT office  will be calling to schedule the appointment. Patient was provided with the phone number to the PT office. No further questions and/or concerns were voiced by the patient at this time. Patient states understanding of the referral that was placed.

## 2023-08-22 NOTE — DISCHARGE INSTRUCTIONS
Diagnosis; fall/ headache/ mid back strain / right lower knee contusion- bruise// left foot strain- overstretching injury     -activity as tolerated     - expect  to feel sore and achy for the  next week/ ace wrap as needed for left foot -can take of and on     - for pain-  both over the counter  generic ibuprofen 400 mg together with over the counter generic acetaminophen 500 mgh - 4 times per day with meals/ liquids

## 2023-08-22 NOTE — TELEPHONE ENCOUNTER
Patient fell at work, hurt Back, head, neck, right ankle and left knee, can we place an order for ortho?

## 2023-08-23 ENCOUNTER — TELEPHONE (OUTPATIENT)
Dept: FAMILY MEDICINE CLINIC | Facility: CLINIC | Age: 38
End: 2023-08-23

## 2023-08-23 NOTE — TELEPHONE ENCOUNTER
Hortencia from [address] [place] [address] ([facility]) called says she received a referral for the patient however, they are OON with her insurance    Hortencia # [phone]

## 2023-08-24 NOTE — TELEPHONE ENCOUNTER
Patient is schedule with [facility] on 08/28

## 2023-08-28 ENCOUNTER — OFFICE VISIT (OUTPATIENT)
Dept: OBGYN CLINIC | Facility: CLINIC | Age: 38
End: 2023-08-28
Payer: COMMERCIAL

## 2023-08-28 VITALS
WEIGHT: 215 LBS | DIASTOLIC BLOOD PRESSURE: 77 MMHG | BODY MASS INDEX: 34.55 KG/M2 | SYSTOLIC BLOOD PRESSURE: 112 MMHG | HEART RATE: 71 BPM | OXYGEN SATURATION: 98 % | HEIGHT: 66 IN

## 2023-08-28 DIAGNOSIS — S93.492A SPRAIN OF ANTERIOR TALOFIBULAR LIGAMENT OF LEFT ANKLE, INITIAL ENCOUNTER: ICD-10-CM

## 2023-08-28 DIAGNOSIS — S80.01XA CONTUSION OF RIGHT KNEE, INITIAL ENCOUNTER: Primary | ICD-10-CM

## 2023-08-28 PROCEDURE — 99203 OFFICE O/P NEW LOW 30 MIN: CPT | Performed by: PHYSICIAN ASSISTANT

## 2023-08-28 NOTE — PROGRESS NOTES
Assessment/Plan   Diagnoses and all orders for this visit:    Contusion of right knee, initial encounter    Sprain of anterior talofibular ligament of left ankle, mild    - Normal objective exams  - Ice as needed  - Ankle exercises as detailed in the AVS  - Activity as tolerated  - Follow up with Dr. Bj Mckeon or Dr. Elias Wayne - may cancel if fully resolved as expected in the next few weeks          Subjective   Patient ID: Mookie Palomares is a 34 y.o. female. Vitals:    08/28/23 1201   BP: 112/77   Pulse: 71   SpO2: 98%     40yo female comes in for an evaluation of her right knee and left ankle. She was injured on 8/21/23 when she fell, striking her right knee on the ground. Xrays in the ED were normal.  She c/o some anterior knee pain and lateral ankle pain.       The following portions of the patient's history were reviewed and updated as appropriate: allergies, current medications, past family history, past medical history, past social history, past surgical history and problem list.    Review of Systems  Ortho Exam  Past Medical History:   Diagnosis Date   • Chlamydia    • Maternal morbid obesity in third trimester, antepartum ([address]) 2/9/2021   • Obesity in pregnancy, antepartum, third trimester 12/29/2020   • Varicella      Past Surgical History:   Procedure Laterality Date   • VAGINAL DELIVERY     • WISDOM TOOTH EXTRACTION       Family History   Problem Relation Age of Onset   • Hypertension Mother    • Hypertension Father    • Hyperlipidemia Father    • No Known Problems Sister    • No Known Problems Son    • No Known Problems Son    • No Known Problems Son    • Heart failure Maternal Grandfather    • Pneumonia Paternal Grandmother    • Heart failure Paternal Grandfather    • Breast cancer Paternal Aunt         Per pt dx in her 52's     Social History     Occupational History   • Not on file   Tobacco Use   • Smoking status: Former     Packs/day: 0.50     Years: 10.00     Total pack years: 5.00     Types: Cigarettes     Quit date:      Years since quittin.6   • Smokeless tobacco: Never   Vaping Use   • Vaping Use: Never used   Substance and Sexual Activity   • Alcohol use: Not Currently   • Drug use: Never   • Sexual activity: Yes     Partners: Male     Birth control/protection: None       Review of Systems   Constitutional: Negative. HENT: Negative. Eyes: Negative. Respiratory: Negative. Cardiovascular: Negative. Gastrointestinal: Negative. Endocrine: Negative. Genitourinary: Negative. Musculoskeletal: As below. .   Allergic/Immunologic: Negative. Neurological: Negative. Hematological: Negative. Psychiatric/Behavioral: Negative. Objective   Physical Exam    · Constitutional: Awake, Alert, Oriented  · Eyes: EOMI  · Psych: Mood and affect appropriate  · Heart: regular rate   · Lungs: No audible wheezing  · Abdomen: No guarding  · Lymph: no lymphedema  • right Knee:  - Appearance  • No swelling, discoloration, deformity, or ecchymosis  - Effusion  • none  - Palpation  • No tenderness about the medial / lateral joint line, patella, patellar tendon, MCL, LCL, hamstrings, or medial / lateral tibial plateau.  - ROM  • Extension: 0 and Flexion: 130  - Special Tests  • Dionicio's Test negative, Lachman's Test negative, Anterior Drawer Test negative, Posterior Drawer Test negative, Valgus Stress Test negative, Varus Stress Test negative and Patellar apprehension negative  - Motor  • normal 5/5 in all planes  - NVI distally    • left ankle:  - Appearance  • No swelling, ecchymosis, erythema, or rash  - Tenderness   • Mild tenderness over the ATF  • No tenderness about the med/lat malleoli, deltoid, cf, ptf, Achilles, calcaneus, talus, 5th mt, or proximal fibula.   - ROM  • Full and pain-free active ROM  - Special Tests  • Negative anterior drawer  - Motor 5/5 all planes  - NVI distally      I have personally reviewed pertinent films in PACS and my interpretation is No acute displaced fractures on xray. Fam Neg

## 2023-08-28 NOTE — LETTER
August 28, 2023     Patient: Luh Marie  YOB: 1985  Date of Visit: 8/28/2023      To Whom it May Concern:    Luh Marie is under my professional care. Cleveland Vasquez was seen in my office on 8/28/2023. Cleveland Vasquez may return to work with no restrictions from the knee/ankle standpoint. If you have any questions or concerns, please don't hesitate to call.          Sincerely,          Jayden Luz PA-C        CC: No Recipients

## 2023-08-28 NOTE — PATIENT INSTRUCTIONS
Ankle Exercises   AMBULATORY CARE:   What you need to know about ankle exercises: Ankle exercises help strengthen your ankle and improve its function after injury. These are beginning exercises. Ask your healthcare provider if you need to see a physical therapist for more advanced exercises. General guidelines for ankle exercises:   Do these exercises 3 to 5 days a week, or as directed by your healthcare provider. Ask if you should do the exercises on each ankle. Do the exercises in the order that your healthcare provider recommends. This will help prevent swelling, chronic pain, and reinjury. Start with range of motion exercises. Then move to strengthening exercises, and finally to balancing exercises. Warm up before you do ankle exercises. Walk or ride a stationary bike for 5 to 10 minutes to prepare your ankle for movement. Stop if you feel pain. It is normal to feel some discomfort at first but you should not feel pain. Tell your doctor or physical therapist if you have pain while you exercise. Regular exercise will help decrease your discomfort over time. How to perform range of motion exercises safely:  Begin with range of motion exercises to improve flexibility. Ask your healthcare provider when you can progress to strengthening exercises. Ankle alphabet:  Sit on a chair so that your feet do not touch the floor. Use your big toe to write each letter of the alphabet. Use only your foot and ankle, and keep your movements small. Do 2 sets. Calf stretches:      Sitting calf stretches with a towel:  Sit on the floor with both legs out straight in front of you. Loop a towel around the ball of your injured foot. Grasp the ends of the towel and pull it toward you. Keep your leg and back straight. Do not lean forward as you pull the towel. Hold for 30 seconds. Then relax for 30 seconds. Do 2 sets of 10.          Standing calf stretches:  Stand facing a wall with the foot that is not injured forward and your knee slightly bent. Keep the leg with the injured foot straight and behind you with your toes pointed in slightly. With both heels flat on the floor, press your hips forward. Do not arch your back. Hold for 30 seconds, and then relax for 30 seconds. Do 2 sets of 10. Repeat with your leg bent. Do 2 sets of 10. How to perform strengthening exercises safely:  After you can perform range of motion exercises without pain, you may begin strengthening exercises. Ask your healthcare provider when you can progress to balancing exercises. Ankle movement in 4 directions:  Sit on the floor with your legs straight in front of you. Keep your heels on the floor for support. Dorsiflexion:  Begin with your toes pointing straight up. Pull your toes toward your body. Slowly return to the starting position. Do 3 sets of 5. Plantar flexion:  Begin with your toes pointing straight up. Push your toes away from your body. Slowly return to the starting position. Do 3 sets of 5. Inversion:  Begin with your toes pointing straight up. Push your toes inward, toward each other. Slowly return to the starting position. Do 3 sets of 5. Eversion:  Begin with your toes pointing straight up. Push your toes outward, away from each other. Slowly return to the starting position. Do 3 sets of 5. Toe curls with a towel:  Sit on a chair so that both of your feet are flat on the floor. Place a small towel on the floor in front of your injured foot. Grab the center of the towel with your toes and curl the towel toward you. Relax and repeat. Do 1 set of 5. Marydel pick-ups:  Sit on a chair so that both of your feet are flat on the floor. Place 20 marbles on the floor in front of your injured foot. Use your toes to  one marble at a time and place it into a bowl. Repeat until you have picked up all the marbles. Do 1 set.      Heel raises:      Single leg heel raises:  Stand with your weight evenly on both feet. Hold on to a chair or a wall for balance. Lift the foot that is not injured off the floor so all your weight is placed on your injured foot. Raise the heel of your injured foot as high as you can. Slowly lower your heel to the floor. Do 1 set of 10. Double leg heel raises:  Stand with your weight evenly on both feet. Hold on to a chair or a wall for balance. Raise both of your heels as high as you can. Slowly lower your heels to the floor. Do 1 set of 10. Heel and toe walks:      Heel walks:  Begin in a standing position. Lift your toes off the floor and walk on your heels. Keep your toes lifted as high as possible. Do 2 sets of 10. Toe walks:  Begin in a standing position. Lift your heels off the floor and walk on the balls and toes of your feet. Keep your heels lifted as high as possible. Do 2 sets of 10. How to perform a balance exercise safely:  After you can perform strengthening exercises without pain, you may do this beginning balancing exercise. Ask your healthcare provider for more advanced balance exercises. Single leg stance:  Stand with your weight evenly on both feet, or hold on to a chair or a wall. Do not lean to the side. Lift the foot that is not injured off the floor so all your weight is placed on your injured foot. Balance on your injured foot. Ask your healthcare provider how long to hold this position. Call your doctor or physical therapist if:   You have new pain, or your pain becomes worse. You have questions or concerns about your condition, care, or exercise program.    © Copyright Jennifer Gonzalez 2022 Information is for End User's use only and may not be sold, redistributed or otherwise used for commercial purposes. The above information is an  only. It is not intended as medical advice for individual conditions or treatments.  Talk to your doctor, nurse or pharmacist before following any medical regimen to see if it is safe and effective for you.

## 2023-08-31 ENCOUNTER — EVALUATION (OUTPATIENT)
Dept: PHYSICAL THERAPY | Facility: CLINIC | Age: 38
End: 2023-08-31
Payer: COMMERCIAL

## 2023-08-31 DIAGNOSIS — M54.2 ACUTE NECK PAIN: ICD-10-CM

## 2023-08-31 DIAGNOSIS — M54.50 ACUTE BILATERAL LOW BACK PAIN WITHOUT SCIATICA: Primary | ICD-10-CM

## 2023-08-31 PROCEDURE — 97162 PT EVAL MOD COMPLEX 30 MIN: CPT

## 2023-08-31 NOTE — PROGRESS NOTES
PT Evaluation     Today's date: 2023  Patient name: Ghada Burkett  : 1985  MRN: [id number]  Referring provider: Anatoly Nieto PT  Dx:   Encounter Diagnosis     ICD-10-CM    1. Acute bilateral low back pain without sciatica  M54.50 Ambulatory referral to PT spine      2. Acute neck pain  M54.2 Ambulatory referral to PT spine                     Assessment  Assessment details: Pt is a pleasant 34 y.o. female who presents to [facility] with low back, R knee, L ankle pain that began after fall on 23. She had no pain prior to fall. Today, she presents with decreased and painful thoracolumbar ROM and joint mobility, decreased B LE and core strength, high self reports of pain, and decreased tolerance to activity. Functionally, she is limited in her ability to stand and ambulate, sit for prolonged durations, care for young kids, perform normal work activities, and participate in age appropriate recreation. She is motivated to improve. Pt will benefit from skilled PT to address the aforementioned deficits and limitations in an effort to maximize pain free functional mobility and overall quality of life. Progress as able with these goals in mind. Impairments: abnormal coordination, abnormal gait, abnormal muscle firing, abnormal muscle tone, abnormal or restricted ROM, abnormal movement, activity intolerance, impaired physical strength and pain with function  Understanding of Dx/Px/POC: good   Prognosis: good    Goals  Short term goals (3 weeks):  1) Pt will improve thoracolumbar mobility deficits by 25% pain free. 2) Pt will improve B LE and core strength deficits by 1/3 grade MMT. 3) Pt will improve pain at worse to <4/10. 4) Pt will initiate and progress HEP w/ special emphasis on functional core control and thoracolumbar mobility. Long term goals (6 weeks)  1) Pt will improve FOTO to at least 63.  2) Pt will perform two full weeks of work w/o deficit related to low back.    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 4) Pt will be independent and compliant w/ HEP in order to maximize functional benefit of skilled PT following d/c.         Plan  Plan details: HEP to start: see code    Patient would benefit from: skilled PT  Planned modality interventions: cryotherapy and thermotherapy: hydrocollator packs  Planned therapy interventions: abdominal trunk stabilization, activity modification, ADL retraining, manual therapy, massage, motor coordination training, neuromuscular re-education, patient education, therapeutic training, therapeutic exercise, therapeutic activities, stretching, strengthening, home exercise program, functional ROM exercises, gait training, balance, balance/weight bearing training and body mechanics training  Frequency: 2x week  Duration in visits: 12  Duration in weeks: 6  Treatment plan discussed with: patient        Subjective Evaluation    History of Present Illness  Mechanism of injury: Pt slipped on package of yogurt at store on 23. Notes that she fell onto R knee, L ankle, has had pain in back and head/neck area since. Has been getting HA behind forehead since fall. Able to sleep without pain. Sitting limit 15 mins right now. Standing tolerance 10 mins. Unable to work due to pain. Has three young kids, has difficulty caring for them, mainly due to back pain. Main goal is pain relief. Functional update below:   Quality of life: good    Patient Goals  Patient goals for therapy: increased strength, decreased pain, increased motion, return to sport/leisure activities and return to work  Patient goal: be able to move without pain, be able to work and play with kids without pain!   Pain  Current pain ratin  At best pain ratin  At worst pain ratin  Location: midline low back, midline neck, HA behind forehead, R knee, L ankle and foot  Quality: dull ache and tight  Relieving factors: rest, change in position and ice  Aggravating factors: lifting, stair climbing, walking, standing, sitting, running and overhead activity (holding 3year old, sitting and driving )    Social Support  Steps to enter house: yes  Stairs in house: yes   Lives in: multiple-level home  Lives with: spouse (3 kids, 3 3 and 10years old)    Employment status: not working ([employer], [employer], plans to return when able)        Objective     Concurrent Complaints  Negative for bladder dysfunction, bowel dysfunction and saddle (S4) numbness    Postural Observations  Seated posture: poor  Standing posture: poor  Correction of posture: makes symptoms better        Palpation     Additional Palpation Details  Pt is TTP and has increased tissue density through R side patellar tendon and crest of tibia, L ATFL, along distal thoracolumbar PS and R side SO     Neurological Testing     Sensation     Lumbar   Left   Intact: light touch    Right   Intact: light touch    Active Range of Motion     Additional Active Range of Motion Details  Flex: 33%*  Ext: 25%*  SB R: 25%*  SB L: 33%*  Rot R: 25%*  Rot L: *%    *indicates pain, guarded throughout    Cervical ROM grossly 50-75% of all normal limits w/o effect on HA or neck pain     Shoulder abduction arc at least 75% of normal ROM w/o effect on neck pain    Strength/Myotome Testing     Additional Strength Details  LE Strength (R/L)    Hip  Flex 4/5 , 4/5  Ext 4/5 , 4/5  Abd 4/5 , 4/5  Add 4/5 , 4/5    Knee   Flex 4-/5* , 4/5  Ext 4-/5* , 4/5    Core Strength: no greater than 1+/5 w/ cueing required for pacing and form     *Indicates pain    Tests     Additional Tests Details  Manual lumbar traction: no relief, slight increase in pain    Thoracolumbar joint mobility: hypo w/ pain at L2-S1    Functional Testing:   Sit<>stand: UE support on LE, takes increased time to achieve full standing position     BW squat: not past 25% before compensation and pain     Stairs: TBA     SLS: not tested     L ankle ROM decreased by 25% for inv and PF, strength adequate B     B knee ROM grossly WNL w/ pain at end range R knee flexion     Prone lying w/ one pillow provides relief, NICK increases sx         Ambulation     Ambulation: Level Surfaces     Additional Level Surfaces Ambulation Details  Min antalgic w/ decreased step length and speed overall. Poor lumbar rotation. Guarded throughout      [place]'s Most Recent Value   PT/OT G-Codes    Current Score 29   Projected Score 63   FOTO information reviewed Yes              POC expires Auth Status Total   Visits  Start date  Expiration date PT/OT + Visit Limit?  Co-Insurance    req     No                                             Visit/Unit Tracking  AUTH Status:  Date               Visits  Authed:  Used                Remaining                    Dummy Auth Tracking  1 2 3 4 5 6   7 8 9 10 11 12   13 14 15 16 17 18   19 20 21 22 23 24   25 26 27 28 29 30   31 32 33 34 35 36         Precautions: date of fall - 8/21/23    Date (Visit #) IE 8/31 (1)  (2) (3)  (4)  (5)   Manual              DTM and TPR             Lumbar mobs             Lumbar traction                                            Exercise Diary              Ther Ex        Active w/u             HS/glute/piri/HF stretching  tested           Mobility (LTR, open books, cat/cow) LTR x 20  Prone lying x 2 mins  Prone pillow under hips x 2 mins  NICK attempted       Rows/ext        Hip stability        Ankle circles  x10-15       Seated calf raise x10-15        cerv distraction  x2-3 mins                       Neuro Re Ed        TrA progressions   isos x 10, ad sq x10           Bridge progressions  intro            Squat progressions             Hip abd progressions             Balance                HEP and POC review  x4-5 mins                               Ther Act             Stairs             Functional Transfers             Functional Lifting Modalities             heat             Ice              Mechanical Traction                Initial HEP from eval 8/31:  Access Code: [email or web address]  URL: [email or web address]  Date: 08/31/2023  Prepared by: Juan Alberto Martinez    Exercises  - Supine Lower Trunk Rotation  - 1 x daily - 7 x weekly - 3 sets - 10 reps  - Supine Posterior Pelvic Tilt  - 1 x daily - 7 x weekly - 3 sets - 10 reps  - Supine March  - 1 x daily - 7 x weekly - 3 sets - 10 reps  - Supine Hip Adduction Isometric with Ball  - 1 x daily - 7 x weekly - 3 sets - 10 reps  - Lying Prone with 2 Pillows  - 1 x daily - 7 x weekly - 3 sets - 10 reps  - Seated Ankle Circles  - 1 x daily - 7 x weekly - 3 sets - 10 reps  - Seated Heel Raise  - 1 x daily - 7 x weekly - 3 sets - 10 reps  - Hooklying Neck Distraction and Traction  - 1 x daily - 7 x weekly - 3 sets - 10 reps

## 2023-09-06 ENCOUNTER — OFFICE VISIT (OUTPATIENT)
Dept: PHYSICAL THERAPY | Facility: CLINIC | Age: 38
End: 2023-09-06
Payer: COMMERCIAL

## 2023-09-06 DIAGNOSIS — M54.2 ACUTE NECK PAIN: ICD-10-CM

## 2023-09-06 DIAGNOSIS — M54.50 ACUTE BILATERAL LOW BACK PAIN WITHOUT SCIATICA: Primary | ICD-10-CM

## 2023-09-06 PROCEDURE — 97110 THERAPEUTIC EXERCISES: CPT

## 2023-09-06 NOTE — PROGRESS NOTES
Daily Note     Today's date: 2023  Patient name: Deloris Petersen  : 1985  MRN: [id number]  Referring provider: Deb Hanson PT  Dx:   Encounter Diagnosis     ICD-10-CM    1. Acute bilateral low back pain without sciatica  M54.50       2. Acute neck pain  M54.2                      Subjective: Pt reports that low back has been really sore lately. Reports that she has more pain in back itself. R knee is sore, pain down leg is slightly better. Main pain is throbbing sensation in R anterior thigh today. Pt arrives 17 min late. Objective: R thigh pain reduces w/ supine assisted L lumbar rotation in hooklying, w/ L hip shift towards all w/ active OP in standing - maintained by end of session. Assessment: Tolerated treatment well. Patient demonstrated fatigue post treatment, exhibited good technique with therapeutic exercises and would benefit from continued PT. Pt asked to hold extension work over the next 2 days, to add in L lumbar rotation and L hip shift motions 4-5x/daily. Good understanding. Session limited by total time, will look to add some functional strength work at next visit barring setback. Plan: Continue per plan of care. trial lumbar mobility drills into pain free ranges, check R thigh sx       POC expires Auth Status Total   Visits  Start date  Expiration date PT/OT + Visit Limit?  Co-Insurance    req     No                                             Visit/Unit Tracking  AUTH Status:  Date               Visits  Authed:  Used                Remaining                    Dummy Auth Tracking  1 2 3 4 5 6   7 8 9 10 11 12   13 14 15 16 17 18   19 20 21 22 23 24   25 26 27 28 29 30   31 32 33 34 35 36         Precautions: date of fall - 23    Date (Visit #) IE  (1)   (2) (3)  (4)  (5)   Manual              DTM and TPR             Lumbar mobs             Lumbar traction                                            Exercise Diary              Ther Ex        Active w/u HS/glute/piri/HF stretching  tested trialed LAD on R LE - deferred s/t pain         Mobility (LTR, open books, cat/cow) LTR x 20  Prone lying x 2 mins  Prone pillow under hips x 2 mins  NICK attempted Assisted L lumbar rotation 4x20 sec    2 way pball roll out (diagonals only) x10-15 each    Standing L hip shift w/ OP towards wall 2x8    Ext work held! Rows/ext        Hip stability        Ankle circles  x10-15 rev      Seated calf raise x10-15  rev      cerv distraction  x2-3 mins x5-6 mins w/ L SO and cerv PS stretching                       Neuro Re Ed        TrA progressions   isos x 10, ad sq x10  isos x 20         Bridge progressions  intro            Squat progressions             Hip abd progressions             Balance                HEP and POC review  x4-5 mins Rev x 2-3 mins                              Ther Act             Stairs             Functional Transfers             Functional Lifting                                                                                                                                                           Modalities             heat             Ice              Mechanical Traction                Initial HEP from al 8/31:  Access Code: [email or web address]  URL: [email or web address]  Date: 08/31/2023  Prepared by: Massimo Kaur    Exercises  - Supine Lower Trunk Rotation  - 1 x daily - 7 x weekly - 3 sets - 10 reps  - Supine Posterior Pelvic Tilt  - 1 x daily - 7 x weekly - 3 sets - 10 reps  - Supine March  - 1 x daily - 7 x weekly - 3 sets - 10 reps  - Supine Hip Adduction Isometric with Ball  - 1 x daily - 7 x weekly - 3 sets - 10 reps  - Lying Prone with 2 Pillows  - 1 x daily - 7 x weekly - 3 sets - 10 reps  - Seated Ankle Circles  - 1 x daily - 7 x weekly - 3 sets - 10 reps  - Seated Heel Raise  - 1 x daily - 7 x weekly - 3 sets - 10 reps  - Hooklying Neck Distraction and Traction  - 1 x daily - 7 x weekly - 3 sets - 10 reps

## 2023-09-08 ENCOUNTER — APPOINTMENT (OUTPATIENT)
Dept: PHYSICAL THERAPY | Facility: CLINIC | Age: 38
End: 2023-09-08
Payer: COMMERCIAL

## 2023-09-11 ENCOUNTER — OFFICE VISIT (OUTPATIENT)
Dept: PHYSICAL THERAPY | Facility: CLINIC | Age: 38
End: 2023-09-11
Payer: COMMERCIAL

## 2023-09-11 DIAGNOSIS — M54.2 ACUTE NECK PAIN: ICD-10-CM

## 2023-09-11 DIAGNOSIS — M54.50 ACUTE BILATERAL LOW BACK PAIN WITHOUT SCIATICA: Primary | ICD-10-CM

## 2023-09-11 PROCEDURE — 97110 THERAPEUTIC EXERCISES: CPT

## 2023-09-11 PROCEDURE — 97112 NEUROMUSCULAR REEDUCATION: CPT

## 2023-09-11 NOTE — PROGRESS NOTES
Daily Note     Today's date: 2023  Patient name: Mookie Palomares  : 1985  MRN: [id number]  Referring provider: Sonali Newman, PT  Dx:   Encounter Diagnosis     ICD-10-CM    1. Acute bilateral low back pain without sciatica  M54.50       2. Acute neck pain  M54.2                      Subjective: Pt reports pain at an 8/10 in L side low back to start session. Reports that new motions help, has held ext work since last week. Notes that her pain is bad in the morning, slightly improves w/ motion throughout the day, then gets sore again at night. Objective: min LLD in supine, R LE 2-3 cm > L - mod correction post grade V mob for L ant rotated innominate       Assessment: Tolerated treatment well. Patient demonstrated fatigue post treatment and would benefit from continued PT. Pt does well w/ today's visit, despite min pain relief. Shows improving motion post mobility work, activates core well w/ decreasing need for cueing as reps continue. Will benefit from more functional lifting as sx allow. Focus on techniques that replicate home activities. Plan: Continue per plan of care. kellen curl work, 3 way pball work, self mobility work     POC expires Auth Status Total   Visits  Start date  Expiration date PT/OT + Visit Limit?  Co-Insurance    No auth until 24 visits per  on      No                                             Visit/Unit Tracking  AUTH Status:  Date               Visits  Authed:  Used                Remaining                    Dummy Auth Tracking  1 2 3 4 5 6   7 8 9 10 11 12   13 14 15 16 17 18   19 20 21 22 23 24   25 26 27 28 29 30   31 32 33 34 35 36         Precautions: date of fall - 23    Date (Visit #) IE  (1)   (2)  (3)  (4)  (5)   Manual              DTM and TPR             Lumbar mobs             Lumbar traction       [place] roll L side grade V PA hip mob x1 for L ant rotated innominate                                     Exercise Diary Ther Ex        Active w/u             HS/glute/piri/HF stretching  tested trialed LAD on R LE - deferred s/t pain LAD on L 2x2 min  L HS 3x30 sec        Mobility (LTR, open books, cat/cow) LTR x 20  Prone lying x 2 mins  Prone pillow under hips x 2 mins  NICK attempted Assisted L lumbar rotation 4x20 sec    2 way pball roll out (diagonals only) x10-15 each    Standing L hip shift w/ OP towards wall 2x8    Ext work held! Assisted L lumbar rotation in supine 6x20 sec              2x12 total     Rows/ext        Hip stability        Ankle circles  x10-15 rev      Seated calf raise x10-15  rev      cerv distraction  x2-3 mins x5-6 mins w/ L SO and cerv PS stretching  rev                     Neuro Re Ed        TrA progressions   isos x 10, ad sq x10  isos x 20  2x10       Bridge progressions  intro     2x10       Squat progressions             Hip abd progressions             Balance   pball iso press x10, w/ march and hip abd 2x10             HEP and POC review  x4-5 mins Rev x 2-3 mins Rev x 2-3 mins                             Ther Act             Stairs             Functional Transfers             Functional Lifting                                                                                                                                                           Modalities             heat             Ice              Mechanical Traction                Initial HEP from al 8/31:  Access Code: [email or web address]  URL: [email or web address]  Date: 08/31/2023  Prepared by: Kellen Foster    Exercises  - Supine Lower Trunk Rotation  - 1 x daily - 7 x weekly - 3 sets - 10 reps  - Supine Posterior Pelvic Tilt  - 1 x daily - 7 x weekly - 3 sets - 10 reps  - Supine March  - 1 x daily - 7 x weekly - 3 sets - 10 reps  - Supine Hip Adduction Isometric with Ball  - 1 x daily - 7 x weekly - 3 sets - 10 reps  - Lying Prone with 2 Pillows  - 1 x daily - 7 x weekly - 3 sets - 10 reps  - Seated Ankle Circles  - 1 x daily - 7 x weekly - 3 sets - 10 reps  - Seated Heel Raise  - 1 x daily - 7 x weekly - 3 sets - 10 reps  - Hooklying Neck Distraction and Traction  - 1 x daily - 7 x weekly - 3 sets - 10 reps

## 2023-09-13 ENCOUNTER — OFFICE VISIT (OUTPATIENT)
Dept: PHYSICAL THERAPY | Facility: CLINIC | Age: 38
End: 2023-09-13
Payer: COMMERCIAL

## 2023-09-13 DIAGNOSIS — M54.50 ACUTE BILATERAL LOW BACK PAIN WITHOUT SCIATICA: Primary | ICD-10-CM

## 2023-09-13 DIAGNOSIS — M54.2 ACUTE NECK PAIN: ICD-10-CM

## 2023-09-13 PROCEDURE — 97112 NEUROMUSCULAR REEDUCATION: CPT

## 2023-09-13 PROCEDURE — 97110 THERAPEUTIC EXERCISES: CPT

## 2023-09-13 NOTE — PROGRESS NOTES
Daily Note     Today's date: 2023  Patient name: Magaly Cardenas  : 1985  MRN: [id number]  Referring provider: Marguerite Sandoval, PT  Dx:   Encounter Diagnosis     ICD-10-CM    1. Acute bilateral low back pain without sciatica  M54.50       2. Acute neck pain  M54.2                      Subjective: Pt reports that she feels about the same, exercises help in the moment but haven't provided lasting relief. Notes 7-8/10 pain to start session. Objective: requires cueing w/ all core bracing activities to stabilize spine prior to adding leg motions, mod correction. Assessment: Tolerated treatment well. Patient demonstrated fatigue post treatment and would benefit from continued PT. Does well w/ intro to functional lifting. Did not significantly increase reps or intensity as pt had similar level of back soreness throughout session. Shows good tolerance to squats when form is modified and corrected, will require further attention to this in the future. Added below to HEP. Increase intensity as able. Plan: Continue per plan of care. seated good mornings, pball roll out work, prayer pose     POC expires Auth Status Total   Visits  Start date  Expiration date PT/OT + Visit Limit?  Co-Insurance    No auth until 24 visits per  on      No                                             Visit/Unit Tracking  AUTH Status:  Date               Visits  Authed:  Used                Remaining                    Dummy Auth Tracking  1 2 3 4 5 6   7 8 9 10 11 12   13 14 15 16 17 18   19 20 21 22 23 24   25 26 27 28 29 30   31 32 33 34 35 36         Precautions: date of fall - 23    Date (Visit #) IE  (1)   (2)  (3)   (4)  (5)   Manual              DTM and TPR             Lumbar mobs             Lumbar traction       [place] roll L side grade V PA hip mob x1 for L ant rotated innominate                                     Exercise Diary              Ther Ex        Active w/u HS/glute/piri/HF stretching  tested trialed LAD on R LE - deferred s/t pain LAD on L 2x2 min  L HS 3x30 sec   x6-7 mins total same     Mobility (LTR, open books, cat/cow) LTR x 20  Prone lying x 2 mins  Prone pillow under hips x 2 mins  NICK attempted Assisted L lumbar rotation 4x20 sec    2 way pball roll out (diagonals only) x10-15 each    Standing L hip shift w/ OP towards wall 2x8    Ext work held! Assisted L lumbar rotation in supine 6x20 sec              2x12 total 10x10-15 sec throughout    Rows/ext    LTR x 30 throughout    Hip stability        Ankle circles  x10-15 rev      Seated calf raise x10-15  rev      cerv distraction  x2-3 mins x5-6 mins w/ L SO and cerv PS stretching  rev no                    Neuro Re Ed        TrA progressions   isos x 10, ad sq x10  isos x 20  2x10  isos, march, single leg ext x10-15 each  90/90 taps 2x5    10 mins     Bridge progressions  intro     2x10  2x10     Squat progressions        w/ dowel x 10, w/ 9# KB to 6" step x 10 only     Form rev x 4 mins     Hip abd progressions             Balance   pball iso press x10, w/ march and hip abd 2x10             HEP and POC review  x4-5 mins Rev x 2-3 mins Rev x 2-3 mins Rev x2-3 mins                            Ther Act             Stairs             Functional Transfers             Functional Lifting                                                                                                                                                           Modalities             heat             Ice              Mechanical Traction                Initial HEP from eval 8/31:  Access Code: [email or web address]  URL: [email or web address]  Date: 08/31/2023  Prepared by: Jennifer Splinter    Exercises  - Supine Lower Trunk Rotation  - 1 x daily - 7 x weekly - 3 sets - 10 reps  - Supine Posterior Pelvic Tilt  - 1 x daily - 7 x weekly - 3 sets - 10 reps  - Supine March  - 1 x daily - 7 x weekly - 3 sets - 10 reps  - Supine Hip Adduction Isometric with Ball  - 1 x daily - 7 x weekly - 3 sets - 10 reps  - Lying Prone with 2 Pillows  - 1 x daily - 7 x weekly - 3 sets - 10 reps  - Seated Ankle Circles  - 1 x daily - 7 x weekly - 3 sets - 10 reps  - Seated Heel Raise  - 1 x daily - 7 x weekly - 3 sets - 10 reps  - Hooklying Neck Distraction and Traction  - 1 x daily - 7 x weekly - 3 sets - 10 reps

## 2023-09-20 ENCOUNTER — APPOINTMENT (OUTPATIENT)
Dept: PHYSICAL THERAPY | Facility: CLINIC | Age: 38
End: 2023-09-20
Payer: COMMERCIAL

## 2023-09-26 ENCOUNTER — OFFICE VISIT (OUTPATIENT)
Dept: PHYSICAL THERAPY | Facility: CLINIC | Age: 38
End: 2023-09-26
Payer: COMMERCIAL

## 2023-09-26 DIAGNOSIS — M54.2 ACUTE NECK PAIN: ICD-10-CM

## 2023-09-26 DIAGNOSIS — M54.50 ACUTE BILATERAL LOW BACK PAIN WITHOUT SCIATICA: Primary | ICD-10-CM

## 2023-09-26 PROCEDURE — 97112 NEUROMUSCULAR REEDUCATION: CPT

## 2023-09-26 PROCEDURE — 97110 THERAPEUTIC EXERCISES: CPT

## 2023-09-26 NOTE — PROGRESS NOTES
Daily Note     Today's date: 2023  Patient name: Deloris Petersen  : 1985  MRN: [id number]  Referring provider: Deb Hanson, PT  Dx:   Encounter Diagnosis     ICD-10-CM    1. Acute bilateral low back pain without sciatica  M54.50       2. Acute neck pain  M54.2                      Subjective: Pt reports 7/10 pain to start session. Notes that sx are relatively similar to last visit. Had some issues w/ home work schedule, , was unable to attend last week's visits. Tried to reschedule but was unable. Leaves for [place] for week and a half on . Wants to continue w/ PT when she is back. Arrives 12 min late today. Objective: requires cueing for pacing and form w/ prayer pose and cat/camel, tends to compensate through shoulders and hips instead of using lumbar mobility - corrects and maintains intermittently. Assessment: Tolerated treatment well. Patient demonstrated fatigue post treatment and would benefit from continued PT. Does well w/ exercise progressions. Slight improvement in mobility post. Asked her to call or email w/ any issues during trip. Reviewed HEP and driving positions extensively, pt declined need for handout. We will re-assess sx when she returns from [place], should her sx be unchanged we will contact ortho about next possible steps. Pt in agreement w/ plan. Plan: Continue per plan of care. progress note - check on how trip went     POC expires Auth Status Total   Visits  Start date  Expiration date PT/OT + Visit Limit?  Co-Insurance    No auth until 24 visits per  on      No                                             Dummy Auth Tracking  1 2 3 4 5 6   7 8 9 10 11 12   13 14 15 16 17 18   19 20 21 22 23 24   25 26 27 28 29 30   31 32 33 34 35 36         Precautions: date of fall - 23    Date (Visit #) IE  (1)   (2)  (3)   (4)   (5)   Manual              DTM and TPR             Lumbar mobs             Lumbar traction chicago roll L side grade V PA hip mob x1 for L ant rotated innominate                                     Exercise Diary              Ther Ex        Active w/u             HS/glute/piri/HF stretching  tested trialed LAD on R LE - deferred s/t pain LAD on L 2x2 min  L HS 3x30 sec   x6-7 mins total same  x5-6 mins    Mobility (LTR, open books, cat/cow) LTR x 20  Prone lying x 2 mins  Prone pillow under hips x 2 mins  NICK attempted Assisted L lumbar rotation 4x20 sec    2 way pball roll out (diagonals only) x10-15 each    Standing L hip shift w/ OP towards wall 2x8    Ext work held!  Assisted L lumbar rotation in supine 6x20 sec              2x12 total 10x10-15 sec throughout LTR x10-15    Prayer pose demo and practice x 3 mins    Cat/camel x 10 total   Rows/ext    LTR x 30 throughout x20   Hip stability        Ankle circles  x10-15 rev      Seated calf raise x10-15  rev      cerv distraction  x2-3 mins x5-6 mins w/ L SO and cerv PS stretching  rev no rev                   Neuro Re Ed        TrA progressions   isos x 10, ad sq x10  isos x 20  2x10  isos, march, single leg ext x10-15 each  90/90 taps 2x5    10 mins  isos to single leg ext x10-15    Bridge progressions  intro     2x10  2x10  2x10 w/ ad sq   Squat progressions        w/ dowel x 10, w/ 9# KB to 6" step x 10 only     Form rev x 4 mins     Hip abd progressions         Driving posture demo and practice x 5 mins   Balance   pball iso press x10, w/ march and hip abd 2x10             HEP and POC review  x4-5 mins Rev x 2-3 mins Rev x 2-3 mins Rev x2-3 mins Rev x 5 mins w/ POC discussion                           Ther Act             Stairs             Functional Transfers             Functional Lifting                                                                                                                                                           Modalities             heat             Ice              Mechanical Traction                Initial HEP from ye[facility]uda 8/31:  Access Code: [email or web address]  URL: [email or web address]  Date: 08/31/2023  Prepared by: Erin Stallings    Exercises  - Supine Lower Trunk Rotation  - 1 x daily - 7 x weekly - 3 sets - 10 reps  - Supine Posterior Pelvic Tilt  - 1 x daily - 7 x weekly - 3 sets - 10 reps  - Supine March  - 1 x daily - 7 x weekly - 3 sets - 10 reps  - Supine Hip Adduction Isometric with Ball  - 1 x daily - 7 x weekly - 3 sets - 10 reps  - Lying Prone with 2 Pillows  - 1 x daily - 7 x weekly - 3 sets - 10 reps  - Seated Ankle Circles  - 1 x daily - 7 x weekly - 3 sets - 10 reps  - Seated Heel Raise  - 1 x daily - 7 x weekly - 3 sets - 10 reps  - Hooklying Neck Distraction and Traction  - 1 x daily - 7 x weekly - 3 sets - 10 reps

## 2023-09-29 ENCOUNTER — APPOINTMENT (OUTPATIENT)
Dept: PHYSICAL THERAPY | Facility: CLINIC | Age: 38
End: 2023-09-29
Payer: COMMERCIAL

## 2023-10-10 ENCOUNTER — OFFICE VISIT (OUTPATIENT)
Dept: OBGYN CLINIC | Facility: CLINIC | Age: 38
End: 2023-10-10

## 2023-10-10 VITALS
HEART RATE: 72 BPM | DIASTOLIC BLOOD PRESSURE: 87 MMHG | RESPIRATION RATE: 18 BRPM | HEIGHT: 66 IN | SYSTOLIC BLOOD PRESSURE: 126 MMHG | WEIGHT: 230 LBS | BODY MASS INDEX: 36.96 KG/M2

## 2023-10-10 DIAGNOSIS — N91.2 AMENORRHEA: ICD-10-CM

## 2023-10-10 DIAGNOSIS — R35.0 URINARY FREQUENCY: ICD-10-CM

## 2023-10-10 DIAGNOSIS — N91.2 AMENORRHEA: Primary | ICD-10-CM

## 2023-10-10 LAB — SL AMB POCT URINE HCG: NEGATIVE

## 2023-10-10 PROCEDURE — 99213 OFFICE O/P EST LOW 20 MIN: CPT | Performed by: OBSTETRICS & GYNECOLOGY

## 2023-10-10 PROCEDURE — 81025 URINE PREGNANCY TEST: CPT | Performed by: OBSTETRICS & GYNECOLOGY

## 2023-10-10 RX ORDER — VITAMIN A, VITAMIN C, VITAMIN D, VITAMIN E, THIAMINE, RIBOFLAVIN, NIACIN, VITAMIN B6, FOLIC ACID, VITAMIN B12, CALCIUM, IRON, ZINC, COPPER 4000; 120; 400; 22; 1.84; 3; 20; 10; 1; 12; 200; 27; 25; 2 [IU]/1; MG/1; [IU]/1; [IU]/1; MG/1; MG/1; MG/1; MG/1; MG/1; UG/1; MG/1; MG/1; MG/1; MG/1
1 TABLET ORAL DAILY
Qty: 90 TABLET | Refills: 3 | Status: SHIPPED | OUTPATIENT
Start: 2023-10-10

## 2023-10-10 RX ORDER — PNV NO.95/FERROUS FUM/FOLIC AC 28MG-0.8MG
1 TABLET ORAL DAILY
Qty: 90 TABLET | Refills: 3 | Status: SHIPPED | OUTPATIENT
Start: 2023-10-10 | End: 2023-10-10

## 2023-10-10 NOTE — PROGRESS NOTES
Assessment/Plan:     No problem-specific Assessment & Plan notes found for this encounter. Diagnoses and all orders for this visit:    Amenorrhea  -     POCT urine HCG  -     Prenatal Vit-Fe Fumarate-FA (Prenatal Vitamin) 27-0.8 MG TABS; Take 1 tablet by mouth in the morning    Urinary frequency  -     Urine culture      RTO for annual or as needed. Subjective:      Patient ID: Ghada Burkett is a 34 y.o. female who presents for missed menses accompanied by urinary frequency x 2 weeks and pelvic cramping. UPT at home negative x 3. UPT today is also negative. Pt has a hx of irregular menses. She denies fever or chills. She is not breast feeding. HPI    The following portions of the patient's history were reviewed and updated as appropriate: allergies, current medications, past family history, past medical history, past social history, past surgical history and problem list.    Review of Systems   Constitutional:        Dizziness   Genitourinary: Positive for frequency, menstrual problem and pelvic pain. Objective:      /87 (BP Location: Left arm, Patient Position: Sitting, Cuff Size: Adult)   Pulse 72   Resp 18   Ht 5' 6" (1.676 m)   Wt 104 kg (230 lb)   LMP 08/05/2023 (Exact Date)   BMI 37.12 kg/m²          Physical Exam  Vitals and nursing note reviewed. Exam conducted with a chaperone present. Constitutional:       Appearance: Normal appearance. Pulmonary:      Effort: Pulmonary effort is normal.   Neurological:      General: No focal deficit present. Mental Status: She is alert and oriented to person, place, and time.    Psychiatric:         Mood and Affect: Mood normal.         Behavior: Behavior normal.

## 2023-10-11 ENCOUNTER — EVALUATION (OUTPATIENT)
Dept: PHYSICAL THERAPY | Facility: CLINIC | Age: 38
End: 2023-10-11
Payer: COMMERCIAL

## 2023-10-11 ENCOUNTER — APPOINTMENT (OUTPATIENT)
Dept: LAB | Facility: CLINIC | Age: 38
End: 2023-10-11
Payer: COMMERCIAL

## 2023-10-11 DIAGNOSIS — M54.2 ACUTE NECK PAIN: ICD-10-CM

## 2023-10-11 DIAGNOSIS — M54.50 ACUTE BILATERAL LOW BACK PAIN WITHOUT SCIATICA: Primary | ICD-10-CM

## 2023-10-11 PROCEDURE — 87086 URINE CULTURE/COLONY COUNT: CPT | Performed by: OBSTETRICS & GYNECOLOGY

## 2023-10-11 PROCEDURE — 97110 THERAPEUTIC EXERCISES: CPT

## 2023-10-11 PROCEDURE — 97112 NEUROMUSCULAR REEDUCATION: CPT

## 2023-10-11 NOTE — PROGRESS NOTES
Progress Note     Today's date: 10/11/2023  Patient name: Anisa Beach  : 1985  MRN: [id number]  Referring provider: Huber Celaya, PT  Dx:   Encounter Diagnosis     ICD-10-CM    1. Acute bilateral low back pain without sciatica  M54.50       2. Acute neck pain  M54.2                      Subjective: Pt reports that she was away for the last 1.5 weeks on vacation. Notes that she was able to drive down and back, stopped to stretch but felt good overall. Notes that pain is relatively similar. Pt notes that she is happy that pain did not increase, however the pain is the same as it ever was. Wondering if she might be able to explore other options. Functional update below:     Goals  Short term goals (3 weeks): ALL PROGRESSED   1) Pt will improve thoracolumbar mobility deficits by 25% pain free. 2) Pt will improve B LE and core strength deficits by 1/3 grade MMT. 3) Pt will improve pain at worse to <4/10. 4) Pt will initiate and progress HEP w/ special emphasis on functional core control and thoracolumbar mobility. Long term goals (6 weeks) ALL PROGRESSED   1) Pt will improve FOTO to at least 61.  2) Pt will perform two full weeks of work w/o deficit related to low back. 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    4) Pt will be independent and compliant w/ HEP in order to maximize functional benefit of skilled PT following d/c.     *goals extended by 2 and 4 weeks    Pain  Current pain ratin  At best pain ratin  At worst pain ratin-9  Location: midline low back, midline neck, HA behind forehead, R knee, L ankle and foot  Quality: dull ache and tight  Relieving factors: rest, change in position and ice  Aggravating factors: lifting, stair climbing, walking, standing, sitting, running and overhead activity (holding 3year old, sitting and driving )        Objective     Concurrent Complaints  Negative for bladder dysfunction, bowel dysfunction and saddle (S4) numbness    Postural Observations  Seated posture: poor  Standing posture: poor  Correction of posture: makes symptoms better        Palpation     Additional Palpation Details  Pt is TTP and has increased tissue density through R side patellar tendon and crest of tibia, L ATFL, along distal thoracolumbar PS and R side SO    -10/11: continued to lesser extent    Neurological Testing     Sensation     Lumbar   Left   Intact: light touch    Right   Intact: light touch    Active Range of Motion     Additional Active Range of Motion Details  Flex: 33%*  Ext: 25%*  SB R: 25%*  SB L: 33%*  Rot R: 25%*  Rot L: *%    *indicates pain, guarded throughout    Cervical ROM grossly 50-75% of all normal limits w/o effect on HA or neck pain     Shoulder abduction arc at least 75% of normal ROM w/o effect on neck pain    10/11: shoulder R, neck ROM grossly WNL    Thoracolumbar ROM grossly 50-75% of all normal limits (improved since IE), however continued significant pain into mid range extension and L SB/rotation    Strength/Myotome Testing     Additional Strength Details  LE Strength (R/L)    Hip  Flex 4/5 , 4/5  Ext 4/5 , 4/5  Abd 4/5 , 4/5  Add 4/5 , 4/5    Knee   Flex 4-/5* , 4/5  Ext 4-/5* , 4/5    Core Strength: no greater than 1+/5 w/ cueing required for pacing and form     *Indicates pain    10/11: strength is roughly similar     Tests     Additional Tests Details  Manual lumbar traction: no relief, slight increase in pain  -10/11: no relief     Thoracolumbar joint mobility: hypo w/ pain at L2-S1  -10/11: roughly similar     Functional Testing:   Sit<>stand: UE support on LE, takes increased time to achieve full standing position   -10/11: not antalgic, increased time to attain full upright position    BW squat: not past 25% before compensation and pain   -10/11: no past 50% before onset of pain  Unable to squat w/ 18# KB past 5 reps s/t pain      Stairs: TBA   -10/11: has not been a point of emphasis     SLS: not tested   -10/11: has not been a point of emphasis     L ankle ROM decreased by 25% for inv and PF, strength adequate B   -10/11: no deficit    B knee ROM grossly WNL w/ pain at end range R knee flexion   -10/11: no deficit    Prone lying w/ one pillow provides relief, NICK increases sx   -10/11: not indicated today           Ambulation     Ambulation: Level Surfaces     Additional Level Surfaces Ambulation Details  Min antalgic w/ decreased step length and speed overall. Poor lumbar rotation. Guarded throughout   -10/11: not specifically antalgic, good pacing and upright posture, equal WB B   Unable to carry 18# KB for clinical distances w/l L sided back pain      Assessment: Tolerated treatment well. Patient demonstrated fatigue post treatment, exhibited good technique with therapeutic exercises, and would benefit from continued PT. She has been re-assessed after 6 skilled therapy visits. She has made objective improvements in functional strength and motion in B knees and L ankle, as well as B shoulders and neck. Her low to mid back are still in the same amount of pain as at eval, and while her thoracolumbar ROM is slightly improved, she still has significant discomfort w/ extension and L SB/rotation. This has not improved during PT, despite various attempts at manual therapy, functional strength training, and lifting mechanics training. She is unable to lift >20# from the floor, has difficulty carrying or transferring significant weight. This would make work extremely difficult at this point. I gave her information for pain management, she will call to follow up with them regarding other treatment options. In the meantime we will continue w/ functional strength training in order to maximize return to work capacity. Pt in agreement w/ plan. Plan: Continue per plan of care.   Functional strength progressions as able     POC expires Auth Status Total   Visits  Start date  Expiration date PT/OT + Visit Limit? Co-Insurance    No auth until 24 visits per JM on 9/8     No                                             Dummy Auth Tracking  1 2 3 4 5 6   7 8 9 10 11 12   13 14 15 16 17 18   19 20 21 22 23 24   25 26 27 28 29 30   31 32 33 34 35 36         Precautions: date of fall - 8/21/23    Date (Visit #) IE 8/31 (1)  9/6 (2) 9/11 (3)  9/13 (4)  9/26 (5) 10/11 (6) PN   Manual               DTM and TPR              Lumbar mobs              Lumbar traction       [place] roll L side grade V PA hip mob x1 for L ant rotated innominate                                        Exercise Diary               Ther Ex         Active w/u              HS/glute/piri/HF stretching  tested trialed LAD on R LE - deferred s/t pain LAD on L 2x2 min  L HS 3x30 sec   x6-7 mins total same  x5-6 mins  x10 mins total   Mobility (LTR, open books, cat/cow) LTR x 20  Prone lying x 2 mins  Prone pillow under hips x 2 mins  NICK attempted Assisted L lumbar rotation 4x20 sec    2 way pball roll out (diagonals only) x10-15 each    Standing L hip shift w/ OP towards wall 2x8    Ext work held!  Assisted L lumbar rotation in supine 6x20 sec              2x12 total 10x10-15 sec throughout LTR x10-15    Prayer pose demo and practice x 3 mins    Cat/camel x 10 total x10-15    rev    Rows/ext    LTR x 30 throughout x20 X30   Hip stability         Ankle circles  x10-15 rev       Seated calf raise x10-15  rev       cerv distraction  x2-3 mins x5-6 mins w/ L SO and cerv PS stretching  rev no rev No - not needed                      Neuro Re Ed         TrA progressions   isos x 10, ad sq x10  isos x 20  2x10  isos, march, single leg ext x10-15 each  90/90 taps 2x5    10 mins  isos to single leg ext x10-15  Isos to single leg ext x 20 each    90/90 taps x10 only  pain   Bridge progressions  intro     2x10  2x10  2x10 w/ ad sq 2x10 w/ ad sq   Squat progressions        w/ dowel x 10, w/ 9# KB to 6" step x 10 only     Form rev x 4 mins   Reg x10    18# KB x5    18# suitcase carries 25'x3-4   Hip abd progressions         Driving posture demo and practice x 5 mins rev   Balance   pball iso press x10, w/ march and hip abd 2x10               HEP and POC review  x4-5 mins Rev x 2-3 mins Rev x 2-3 mins Rev x2-3 mins Rev x 5 mins w/ POC discussion Rev x 2-3 mins                              Ther Act              Stairs              Functional Transfers              Functional Lifting                                                                                                                                                                      Modalities              heat              Ice               Mechanical Traction                 Initial HEP from eval 8/31:  Access Code: [email or web address]  URL: [email or web address]  Date: 08/31/2023  Prepared by: Quentin Borges    Exercises  - Supine Lower Trunk Rotation  - 1 x daily - 7 x weekly - 3 sets - 10 reps  - Supine Posterior Pelvic Tilt  - 1 x daily - 7 x weekly - 3 sets - 10 reps  - Supine March  - 1 x daily - 7 x weekly - 3 sets - 10 reps  - Supine Hip Adduction Isometric with Ball  - 1 x daily - 7 x weekly - 3 sets - 10 reps  - Lying Prone with 2 Pillows  - 1 x daily - 7 x weekly - 3 sets - 10 reps  - Seated Ankle Circles  - 1 x daily - 7 x weekly - 3 sets - 10 reps  - Seated Heel Raise  - 1 x daily - 7 x weekly - 3 sets - 10 reps  - Hooklying Neck Distraction and Traction  - 1 x daily - 7 x weekly - 3 sets - 10 reps

## 2023-10-12 ENCOUNTER — TELEPHONE (OUTPATIENT)
Dept: OBGYN CLINIC | Facility: CLINIC | Age: 38
End: 2023-10-12

## 2023-10-12 LAB — BACTERIA UR CULT: NORMAL

## 2023-10-12 NOTE — TELEPHONE ENCOUNTER
It is unclear at this time. If it persists, to consider urology referral.  If no period would repeat a UPT at home.     Thank you

## 2023-10-12 NOTE — TELEPHONE ENCOUNTER
----- Message from Valencia Garzon MD sent at 10/12/2023  2:34 PM EDT -----  Please inform Donald Felix no UTI, no need for tx.    TY  ----- Message -----  From: Dirk Mayer RN  Sent: 10/10/2023  12:00 PM EDT  To: Valencia Garzon MD

## 2023-10-12 NOTE — TELEPHONE ENCOUNTER
Called and spoke to patient, informed her of the recommendations - patient verbalized understanding, stated she will keep an eye out for the urological symptoms, and will report back if it persists.

## 2023-10-12 NOTE — TELEPHONE ENCOUNTER
Called and spoke to patient, informed her of the test results.  Patient wanted to know why she had urinary frequency

## 2023-10-18 ENCOUNTER — APPOINTMENT (OUTPATIENT)
Dept: PHYSICAL THERAPY | Facility: CLINIC | Age: 38
End: 2023-10-18
Payer: COMMERCIAL

## 2023-10-18 NOTE — PROGRESS NOTES
Daily Note     Today's date: 10/18/2023  Patient name: Luh Marie  : 1985  MRN: [id number]  Referring provider: Jun Slater, PT  Dx: No diagnosis found. Subjective: Pt reports       Objective:       Assessment: Tolerated treatment well. Patient demonstrated fatigue post treatment and would benefit from continued PT      Plan: Continue per plan of care. POC expires Auth Status Total   Visits  Start date  Expiration date PT/OT + Visit Limit? Co-Insurance    No auth until 24 visits per  on      No                                             Dummy Auth Tracking  1 2 3 4 5 6   7 8 9 10 11 12   13 14 15 16 17 18   19 20 21 22 23 24   25 26 27 28 29 30   31 32 33 34 35 36         Precautions: date of fall - 23    Date (Visit #) IE  (1)   (2)  (3)   (4)   (5) 10/11 (6) PN 10/18 (7)   Manual                DTM and TPR               Lumbar mobs               Lumbar traction       [place] roll L side grade V PA hip mob x1 for L ant rotated innominate                                           Exercise Diary                Ther Ex          Active w/u               HS/glute/piri/HF stretching  tested trialed LAD on R LE - deferred s/t pain LAD on L 2x2 min  L HS 3x30 sec   x6-7 mins total same  x5-6 mins  x10 mins total    Mobility (LTR, open books, cat/cow) LTR x 20  Prone lying x 2 mins  Prone pillow under hips x 2 mins  NICK attempted Assisted L lumbar rotation 4x20 sec    2 way pball roll out (diagonals only) x10-15 each    Standing L hip shift w/ OP towards wall 2x8    Ext work held!  Assisted L lumbar rotation in supine 6x20 sec              2x12 total 10x10-15 sec throughout LTR x10-15    Prayer pose demo and practice x 3 mins    Cat/camel x 10 total x10-15    rev     Rows/ext    LTR x 30 throughout x20 X30    Hip stability          Ankle circles  x10-15 rev        Seated calf raise x10-15  rev        cerv distraction  x2-3 mins x5-6 mins w/ L SO and cerv PS stretching  rev no rev No - not needed                         Neuro Re Ed          TrA progressions   isos x 10, ad sq x10  isos x 20  2x10  isos, march, single leg ext x10-15 each  90/90 taps 2x5    10 mins  isos to single leg ext x10-15  Isos to single leg ext x 20 each    90/90 taps x10 only  pain    Bridge progressions  intro     2x10  2x10  2x10 w/ ad sq 2x10 w/ ad sq    Squat progressions        w/ dowel x 10, w/ 9# KB to 6" step x 10 only     Form rev x 4 mins   Reg x10    18# KB x5    18# suitcase carries 25'x3-4    Hip abd progressions         Driving posture demo and practice x 5 mins rev    Balance   pball iso press x10, w/ march and hip abd 2x10                 HEP and POC review  x4-5 mins Rev x 2-3 mins Rev x 2-3 mins Rev x2-3 mins Rev x 5 mins w/ POC discussion Rev x 2-3 mins                                  Ther Act               Stairs               Functional Transfers               Functional Lifting                                                                                                                                                                                 Modalities              heat              Ice               Mechanical Traction                 Initial HEP from eval 8/31:  Access Code: [email or web address]  URL: [email or web address]  Date: 08/31/2023  Prepared by: Mancil Salines    Exercises  - Supine Lower Trunk Rotation  - 1 x daily - 7 x weekly - 3 sets - 10 reps  - Supine Posterior Pelvic Tilt  - 1 x daily - 7 x weekly - 3 sets - 10 reps  - Supine March  - 1 x daily - 7 x weekly - 3 sets - 10 reps  - Supine Hip Adduction Isometric with Ball  - 1 x daily - 7 x weekly - 3 sets - 10 reps  - Lying Prone with 2 Pillows  - 1 x daily - 7 x weekly - 3 sets - 10 reps  - Seated Ankle Circles  - 1 x daily - 7 x weekly - 3 sets - 10 reps  - Seated Heel Raise  - 1 x daily - 7 x weekly - 3 sets - 10 reps  - Hooklying Neck Distraction and Traction  - 1 x daily - 7 x weekly - 3 sets - 10 reps

## 2023-10-20 ENCOUNTER — APPOINTMENT (OUTPATIENT)
Dept: PHYSICAL THERAPY | Facility: CLINIC | Age: 38
End: 2023-10-20
Payer: COMMERCIAL

## 2023-10-27 ENCOUNTER — APPOINTMENT (OUTPATIENT)
Dept: PHYSICAL THERAPY | Facility: CLINIC | Age: 38
End: 2023-10-27
Payer: COMMERCIAL

## 2023-11-01 ENCOUNTER — APPOINTMENT (OUTPATIENT)
Dept: PHYSICAL THERAPY | Facility: CLINIC | Age: 38
End: 2023-11-01
Payer: COMMERCIAL

## 2023-11-03 ENCOUNTER — APPOINTMENT (OUTPATIENT)
Dept: PHYSICAL THERAPY | Facility: CLINIC | Age: 38
End: 2023-11-03
Payer: COMMERCIAL

## 2023-11-29 ENCOUNTER — OFFICE VISIT (OUTPATIENT)
Dept: FAMILY MEDICINE CLINIC | Facility: CLINIC | Age: 38
End: 2023-11-29

## 2023-11-29 VITALS
SYSTOLIC BLOOD PRESSURE: 120 MMHG | HEART RATE: 76 BPM | HEIGHT: 66 IN | BODY MASS INDEX: 38.41 KG/M2 | RESPIRATION RATE: 16 BRPM | DIASTOLIC BLOOD PRESSURE: 72 MMHG | WEIGHT: 239 LBS | OXYGEN SATURATION: 99 % | TEMPERATURE: 97.8 F

## 2023-11-29 DIAGNOSIS — G89.29 CHRONIC BILATERAL LOW BACK PAIN WITHOUT SCIATICA: Primary | ICD-10-CM

## 2023-11-29 DIAGNOSIS — S99.912D INJURY OF LEFT ANKLE, SUBSEQUENT ENCOUNTER: ICD-10-CM

## 2023-11-29 DIAGNOSIS — M54.50 CHRONIC BILATERAL LOW BACK PAIN WITHOUT SCIATICA: Primary | ICD-10-CM

## 2023-11-29 DIAGNOSIS — M25.561 RIGHT KNEE PAIN, UNSPECIFIED CHRONICITY: ICD-10-CM

## 2023-11-29 PROBLEM — S99.912A LEFT ANKLE INJURY: Status: ACTIVE | Noted: 2023-11-29

## 2023-11-29 PROCEDURE — 99214 OFFICE O/P EST MOD 30 MIN: CPT | Performed by: FAMILY MEDICINE

## 2023-11-29 NOTE — ASSESSMENT & PLAN NOTE
Occurred after fall 8/21/23 was seen in ER sent for pT now will see seeing spine specialist  1/11/24 ;pt needs disability form filled out 9/6 rtw 2/1/24 will check [facility] spine xray so spine dr  can have result

## 2023-11-29 NOTE — PROGRESS NOTES
Name: Kay Patton      : 1985      MRN: [id number]  Encounter Provider: Jaden Houston MD  Encounter Date: 2023   Encounter department: [facility] [address]    Assessment & Plan     1. Chronic bilateral low back pain without sciatica  Assessment & Plan:  Occurred after fall 23 was seen in ER sent for pT now will see seeing spine specialist  24 ;pt needs disability form filled out  rtw 24 will check [facility] spine xray so spine dr  can have result    Orders:  -     XR spine lumbar minimum 4 views non injury; Future; Expected date: 2023    2. Injury of left ankle, subsequent encounter  Assessment & Plan:  Improved xray nl      3. Right knee pain, unspecified chronicity  Assessment & Plan:  Improved nl xray           Depression Screening and Follow-up Plan: Patient was screened for depression during today's encounter. They screened negative with a PHQ-2 score of 0. Subjective      HPI pt fell in [employer] 23 went to ER  23 and has been going to PT  for low back pain and was seen by ortho for right knee pain; does 9 has appt with spine  dr coming up ;Pt told primary Dr must fill out  disability form   Review of Systems   Musculoskeletal:  Positive for back pain (bilateral low back pain). Neurological:  Negative for numbness. Current Outpatient Medications on File Prior to Visit   Medication Sig   • Prenatal Vit-Fe Fumarate-FA (M- Plus) 27-1 MG TABS Take 1 tablet by mouth in the morning   • [DISCONTINUED] Prenatal Vit-Fe Fumarate-FA (PRENATAL 1+1 PO) Take by mouth  (Patient not taking: Reported on 4/10/2023)       Objective     /72 (BP Location: Left arm, Patient Position: Sitting, Cuff Size: Standard)   Pulse 76   Temp 97.8 °F (36.6 °C) (Tympanic)   Resp 16   Ht 5' 6" (1.676 m)   Wt 108 kg (239 lb)   SpO2 99%   BMI 38.58 kg/m²     Physical Exam  Constitutional:       General: She is not in acute distress.      Appearance: Normal appearance. She is well-developed. She is not ill-appearing. Eyes:      Extraocular Movements: Extraocular movements intact. Neck:      Thyroid: No thyromegaly. Cardiovascular:      Rate and Rhythm: Normal rate. Pulmonary:      Effort: Pulmonary effort is normal. No respiratory distress. Breath sounds: Normal breath sounds. Musculoskeletal:      Cervical back: Normal range of motion. Neurological:      General: No focal deficit present. Mental Status: She is alert and oriented to person, place, and time. Mental status is at baseline.    Psychiatric:         Mood and Affect: Mood normal.         Behavior: Behavior normal.       Viki Long MD

## 2023-12-06 ENCOUNTER — OFFICE VISIT (OUTPATIENT)
Dept: PHYSICAL THERAPY | Facility: CLINIC | Age: 38
End: 2023-12-06
Payer: COMMERCIAL

## 2023-12-06 DIAGNOSIS — M54.50 CHRONIC BILATERAL LOW BACK PAIN WITHOUT SCIATICA: Primary | ICD-10-CM

## 2023-12-06 DIAGNOSIS — G89.29 CHRONIC BILATERAL LOW BACK PAIN WITHOUT SCIATICA: Primary | ICD-10-CM

## 2023-12-06 PROCEDURE — 97161 PT EVAL LOW COMPLEX 20 MIN: CPT | Performed by: PHYSICAL THERAPIST

## 2023-12-06 PROCEDURE — 97530 THERAPEUTIC ACTIVITIES: CPT | Performed by: PHYSICAL THERAPIST

## 2023-12-06 NOTE — PROGRESS NOTES
PT Evaluation     Today's date: 2023  Patient name: Ana Henning  : 1985  MRN: [id number]  Referring provider: Self, Referral  Dx:   Encounter Diagnosis     ICD-10-CM    1. Chronic bilateral low back pain without sciatica  M54.50     G89.29           Start Time: 1155  Stop Time: 1230  Total time in clinic (min): 35 minutes    Assessment  Assessment details: Ana Henning is a 34 y.o. female who presents with complaints of Chronic bilateral low back pain without sciatica  (primary encounter diagnosis). No further referral appears necessary at this time based upon examination results. Patient is presenting with likely extension directional preference as symptoms improved following repeated extension in prone. Current symptoms leading to limitations with walking, standing, bending, sitting, driving, caring for son, and completing ADLs. Prognosis is good given HEP compliance and PT 1-2x/wk. Positive prognostic indicators include positive attitude toward recovery. Please contact me if you have any questions or recommendations. Thank you for the opportunity to share in Grisel's care. Impairments: abnormal muscle firing, abnormal or restricted ROM, impaired physical strength, lacks appropriate home exercise program, pain with function, poor posture  and poor body mechanics    Symptom irritability: moderateUnderstanding of Dx/Px/POC: good   Prognosis: good    Plan  Patient would benefit from: skilled physical therapy  Planned therapy interventions: joint mobilization, manual therapy, patient education, postural training, strengthening, stretching, therapeutic activities, therapeutic exercise, home exercise program, neuromuscular re-education, flexibility, functional ROM exercises and abdominal trunk stabilization  Frequency: 1-2x.   Duration in weeks: 4  Treatment plan discussed with: patient        Subjective Evaluation    History of Present Illness  Mechanism of injury: Patient reports stiffness and pain when standing and sitting for too long. Worse in the morning when waking. No peripheralization of symptoms. Sitting tolerance about 30 minutes before tightening up. Will be seeing Dr. Tawanna Robles (pain management) next month. She fell on . Pain had developed later that week. Also hurt knee and ankle that has improved. Does toss and turn with sleeping. Location entire lower back across. Patient denies bowel/bladder dysfunction, saddle paraesthesias, nor cough/sneeze provocation. Pain also with stair navigation and with forward bending. Has 3year old at home. Not a recurrent problem   Quality of life: good    Patient Goals  Patient goals for therapy: decreased pain, increased motion and increased strength    Pain  Current pain ratin  At worst pain ratin  Quality: burning, dull ache and sharp  Alleviating factors: Advil. Aggravating factors: walking, stair climbing and standing    Treatments  Previous treatment: physical therapy        Objective  GAIT: WNL  Squat assess: WNL, pain   Heel toe walk: -    Lumbar  % of normal   Flex. 75p! Extn. 25   SB Left 100   SB Right 100   ROT Left 75   ROT Right 75   Repetitive testing: extension in standing= no change  extension in lying= centralizing   flexion standing= worse flexion in lying= worse    Hip       L       R   Flex. 5 5   Extn. 4 4   Abd. 5 5                 MMT    Knee         L        R   Flex. 5 5   Extn. 5 5              MMT    Ankle       L        R   PF 5 5   DF. 5 5   EHL 5 5   Inv. 5 5   Ever.  5 5     Posture: correction improves symptoms  Palpation: L4  Dermatome: intact   Slump test: L= -  R=  -     Straight leg raise:   L=  -    R=  -            Transverse abdominis: Bent knee fall out= fair     Hip/SI joint:   scour=   -    gerry = -   Multifidus activation =  fair    Joint mobility = decreased L4-5       Insurance:  AMA/CMS Eval/ Re-eval POC expires Nargis Blemerced #/ Referral # Total    Start date  Expiration date Extension  Visit limitation? PT only or  PT+OT? Co-Insurance   AMA 12.6.23 1.5.24  Yes after 24 visits                            AUTH #:  Date 12.6              Authed: Used 7               Remaining  17                  Precautions: standard  Patient provided verbal consent to treatment plan and recommended interventions.       Manuals 12.6            visit 7                                                   Neuro Re-Ed                                                                                                        Ther Ex             Press up 2*10            SOC 10x                                                                                          Ther Activity             Pt ed FB                         Gait Training                                       Modalities

## 2023-12-15 ENCOUNTER — APPOINTMENT (OUTPATIENT)
Dept: PHYSICAL THERAPY | Facility: CLINIC | Age: 38
End: 2023-12-15
Payer: COMMERCIAL

## 2023-12-15 DIAGNOSIS — G89.29 CHRONIC BILATERAL LOW BACK PAIN WITHOUT SCIATICA: Primary | ICD-10-CM

## 2023-12-15 DIAGNOSIS — M54.50 CHRONIC BILATERAL LOW BACK PAIN WITHOUT SCIATICA: Primary | ICD-10-CM

## 2023-12-15 NOTE — PROGRESS NOTES
Daily Note     Today's date: 12/15/2023  Patient name: Grisel Jordan  : 1985  MRN: [id number]  Referring provider: Self, Referral  Dx:   Encounter Diagnosis     ICD-10-CM    1. Chronic bilateral low back pain without sciatica  M54.50     G89.29                    Subjective: ***    Objective: See treatment diary below    Assessment: Tolerated treatment {Tolerated treatment :8058864530}. Patient {assessment:9409252449}    Plan: {PLAN:2833198828}     Insurance:  AMA/CMS Eval/ Re-eval POC expires FOTO Auth #/ Referral # Total    Start date  Expiration date Extension  Visit limitation?  PT only or  PT+OT? Co-Insurance   AMA .23 1.5.24  Yes after 24 visits                            AUTH #:  Date               Authed: Used 7               Remaining  17                  Precautions: standard  Patient provided verbal consent to treatment plan and recommended interventions.    Manuals .15       visit 7 8                                  Neuro Re-Ed                                                                        Ther Ex         Press up 2*10        SOC 10x                                                              Ther Activity         Pt ed FB                 Gait Training                           Modalities

## 2024-01-08 NOTE — PROGRESS NOTES
Assessment  1. Fall, initial encounter    2. Sacroiliitis (HCC)    3. Bilateral low back pain without sciatica, unspecified chronicity        Plan  Ms. Jordan is a 38-year-old female past medical history of PCOS, obesity presents to [facility] for initial evaluation regarding a work-related injury on August 21, 2023 which she reports happened at [facility] while filling out a [employer] order she slipped on yogurt.  Patient reports landing on her left lower extremity and reports acute new onset left ankle left knee and low back pain.  She was seen at urgent care the following day obtaining knee and ankle x-rays that were negative.  No imaging of her back was done.  She reports no relief with conservative measures and presents for initial evaluation regarding continued low back pain since August 21, 2023.  During today's evaluation majority of her pain appears to be generating from the lumbar spine and bilateral sacroiliac joints suspecting sacroiliitis and questionable facet mediated low back pain.  No imaging has been done at this time and for now we will order lumbar x-ray and sacroiliac x-rays.  We did discuss bilateral sacroiliac joint injections under fluoroscopy guidance, however, patient wishes to hold off on interventional approaches at this time.  We will follow-up after imaging results.    My impressions and treatment recommendations were discussed in detail with the patient who verbalized understanding and had no further questions.  Discharge instructions were provided. I personally saw and examined the patient and I agree with the above discussed plan of care.    Orders Placed This Encounter   Procedures    X-ray lumbar spine 2 or 3 views     Standing Status:   Future     Standing Expiration Date:   1/11/2028     Scheduling Instructions:      Bring along any outside films relating to this procedure.           Order Specific Question:   Is the patient pregnant?     Answer:   No    XR  sacroiliac joints 3+ views     Standing Status:   Future     Standing Expiration Date:   1/11/2028     Scheduling Instructions:      Bring along any outside films relating to this procedure.           Order Specific Question:   Is the patient pregnant?     Answer:   No     No orders of the defined types were placed in this encounter.      History of Present Illness    Grisel Jordan is a 38 y.o. female presents to [facility] for initial evaluation regarding isolated low back pain that patient reports happened 4 months ago on August 21, 2023 from a work-related injury.    Today patient reports moderate to severe pain rated 8 out of 10 and interfere with activities.  Pain is nearly constant 60 to 95% of the time that is present throughout the day and night.  Describes symptoms as a sharp, pressure-like, throbbing, dull aching pain.  Denies any significant lower extremity weakness or falls.  Does not use any durable medical equipment for ambulation.  Symptoms are worse with standing, bending.  Reports moderate relief with physical therapy and heat.  Denies smoking, marijuana or alcohol use.  Not currently taking anything for pain.  Presents today for initial evaluation.    I have personally reviewed and/or updated the patient's past medical history, past surgical history, family history, social history, current medications, allergies, and vital signs today.     Review of Systems   Constitutional:  Negative for fever and unexpected weight change.   HENT:  Negative for trouble swallowing.    Eyes:  Negative for visual disturbance.   Respiratory:  Negative for shortness of breath and wheezing.    Cardiovascular:  Negative for chest pain and palpitations.   Gastrointestinal:  Negative for constipation, diarrhea, nausea and vomiting.   Endocrine: Negative for cold intolerance, heat intolerance and polydipsia.   Genitourinary:  Negative for difficulty urinating and frequency.   Musculoskeletal:   Positive for back pain, gait problem and joint swelling. Negative for arthralgias and myalgias.   Skin:  Negative for rash.   Neurological:  Negative for dizziness, seizures, syncope, weakness and headaches.   Hematological:  Does not bruise/bleed easily.   Psychiatric/Behavioral:  Negative for dysphoric mood.    All other systems reviewed and are negative.      Patient Active Problem List   Diagnosis    Possible exposure to STD    Close exposure to COVID-19 virus    COVID-19 virus infection    Encounter for gynecological examination without abnormal finding    Encounter for screening mammogram for malignant neoplasm of breast    PCOS (polycystic ovarian syndrome)    Annual physical exam    Class 2 obesity due to excess calories without serious comorbidity with body mass index (BMI) of 39.0 to 39.9 in adult    Hypopigmentation    Rash    Chronic bilateral low back pain without sciatica    Right knee pain    Left ankle injury       Past Medical History:   Diagnosis Date    Chlamydia     Maternal morbid obesity in third trimester, antepartum (HCC) 2/9/2021    Obesity in pregnancy, antepartum, third trimester 12/29/2020    Varicella        Past Surgical History:   Procedure Laterality Date    VAGINAL DELIVERY      WISDOM TOOTH EXTRACTION         Family History   Problem Relation Age of Onset    Hypertension Mother     Hypertension Father     Hyperlipidemia Father     No Known Problems Sister     No Known Problems Son     No Known Problems Son     No Known Problems Son     Heart failure Maternal Grandfather     Pneumonia Paternal Grandmother     Heart failure Paternal Grandfather     Breast cancer Paternal Aunt         Per pt dx in her 50's       Social History     Occupational History    Not on file   Tobacco Use    Smoking status: Former     Current packs/day: 0.00     Average packs/day: 0.5 packs/day for 10.0 years (5.0 ttl pk-yrs)     Types: Cigarettes     Start date: 1/1/2005     Quit date: 1/1/2015     Years since  quittin.0    Smokeless tobacco: Never   Vaping Use    Vaping status: Never Used   Substance and Sexual Activity    Alcohol use: Not Currently    Drug use: Never    Sexual activity: Yes     Partners: Male     Birth control/protection: None       Current Outpatient Medications on File Prior to Visit   Medication Sig    Prenatal Vit-Fe Fumarate-FA (M- Plus) 27-1 MG TABS Take 1 tablet by mouth in the morning     No current facility-administered medications on file prior to visit.       No Known Allergies    Physical Exam    /95   Pulse 77   Wt 108 kg (239 lb)   BMI 38.58 kg/m²     Constitutional: normal, well developed, well nourished, alert, in no distress and non-toxic and no overt pain behavior.  Eyes: anicteric  HEENT: grossly intact  Neck: supple, symmetric, trachea midline and no masses   Pulmonary:even and unlabored  Cardiovascular:No edema or pitting edema present  Skin:Normal without rashes or lesions and well hydrated  Psychiatric:Mood and affect appropriate  Neurologic:Cranial Nerves II-XII grossly intact  Musculoskeletal:normal gait, tenderness to palpation bilateral lumbar paraspinals and distal to PSIS, decreased active and passive range of motion with lumbar flexion and extension limited by pain, MMT 5 out of 5 bilateral lower extremities, sensation grossly tact to light touch  POSITIVE Sacral compression testing bilaterally  POSITIVE Deric's test bilaterally  POSITIVE thigh thrust bilaterally    Imaging

## 2024-01-11 ENCOUNTER — TELEPHONE (OUTPATIENT)
Dept: PAIN MEDICINE | Facility: CLINIC | Age: 39
End: 2024-01-11

## 2024-01-11 ENCOUNTER — OFFICE VISIT (OUTPATIENT)
Dept: PAIN MEDICINE | Facility: CLINIC | Age: 39
End: 2024-01-11
Payer: COMMERCIAL

## 2024-01-11 ENCOUNTER — TELEPHONE (OUTPATIENT)
Age: 39
End: 2024-01-11

## 2024-01-11 ENCOUNTER — HOSPITAL ENCOUNTER (OUTPATIENT)
Dept: RADIOLOGY | Facility: HOSPITAL | Age: 39
Discharge: HOME | End: 2024-01-11
Payer: OTHER MISCELLANEOUS

## 2024-01-11 VITALS
WEIGHT: 239 LBS | HEART RATE: 77 BPM | SYSTOLIC BLOOD PRESSURE: 117 MMHG | BODY MASS INDEX: 38.58 KG/M2 | DIASTOLIC BLOOD PRESSURE: 95 MMHG

## 2024-01-11 DIAGNOSIS — M54.50 BILATERAL LOW BACK PAIN WITHOUT SCIATICA, UNSPECIFIED CHRONICITY: ICD-10-CM
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
## 2024-01-11 DIAGNOSIS — M46.1 SACROILIITIS (HCC): ICD-10-CM

## 2024-01-11 PROCEDURE — 99244 OFF/OP CNSLTJ NEW/EST MOD 40: CPT | Performed by: PHYSICAL MEDICINE & REHABILITATION

## 2024-01-11 PROCEDURE — 72200 X-RAY EXAM SI JOINTS: CPT

## 2024-01-11 PROCEDURE — 72100 X-RAY EXAM L-S SPINE 2/3 VWS: CPT

## 2024-01-11 NOTE — TELEPHONE ENCOUNTER
----- Message from Javy Tejeda DO sent at 1/11/2024 10:12 AM EST -----  Please notify patient x-ray lumbar spine and x-rays sacroiliac joints not demonstrating any significant degenerative changes, fracture or dislocation  If patient would like to proceed with bilateral sacroiliac joint injections for diagnostic and therapeutic purposes we may  Please advise and we will schedule if interested  Thank you  ----- Message -----  From: Christy, Radiology Results In  Sent: 1/11/2024   9:48 AM EST  To: Javy Tejeda DO

## 2024-01-11 NOTE — TELEPHONE ENCOUNTER
I dont see a form in the chart; she also saw spine specialist today should follow his recommendations from now on

## 2024-01-11 NOTE — TELEPHONE ENCOUNTER
S/w the patient and reviewed. Answered her question in regards to the injection/procedure. She stated she would like to think about it and CB if she would like to proceed with the injections.

## 2024-01-11 NOTE — LETTER
January 11, 2024     Nadine Garcia MD  [address] [facility] [address] [facility] [address]  [address]  [place] [address]    Patient: Grisel Jordan   YOB: 1985   Date of Visit: 1/11/2024       Dear Dr. Garcia:    Thank you for referring Grisel Jordan to me for evaluation. Below are my notes for this consultation.    If you have questions, please do not hesitate to call me. I look forward to following your patient along with you.         Sincerely,        Javy Tejeda DO        CC: No Recipients    Javy Tejeda DO  1/11/2024  9:06 AM  Signed  Assessment  1. Fall, initial encounter    2. Sacroiliitis (HCC)    3. Bilateral low back pain without sciatica, unspecified chronicity        Plan  Ms. Jordan is a 38-year-old female past medical history of PCOS, obesity presents to [facility] for initial evaluation regarding a work-related injury on August 21, 2023 which she reports happened at [facility] while filling out a [employer] order she slipped on yogurt.  Patient reports landing on her left lower extremity and reports acute new onset left ankle left knee and low back pain.  She was seen at urgent care the following day obtaining knee and ankle x-rays that were negative.  No imaging of her back was done.  She reports no relief with conservative measures and presents for initial evaluation regarding continued low back pain since August 21, 2023.  During today's evaluation majority of her pain appears to be generating from the lumbar spine and bilateral sacroiliac joints suspecting sacroiliitis and questionable facet mediated low back pain.  No imaging has been done at this time and for now we will order lumbar x-ray and sacroiliac x-rays.  We did discuss bilateral sacroiliac joint injections under fluoroscopy guidance, however, patient wishes to hold off on interventional approaches at this time.  We will follow-up after imaging results.    My impressions and treatment recommendations were  discussed in detail with the patient who verbalized understanding and had no further questions.  Discharge instructions were provided. I personally saw and examined the patient and I agree with the above discussed plan of care.    Orders Placed This Encounter   Procedures   • X-ray lumbar spine 2 or 3 views     Standing Status:   Future     Standing Expiration Date:   1/11/2028     Scheduling Instructions:      Bring along any outside films relating to this procedure.           Order Specific Question:   Is the patient pregnant?     Answer:   No   • XR sacroiliac joints 3+ views     Standing Status:   Future     Standing Expiration Date:   1/11/2028     Scheduling Instructions:      Bring along any outside films relating to this procedure.           Order Specific Question:   Is the patient pregnant?     Answer:   No     No orders of the defined types were placed in this encounter.      History of Present Illness    Grisel Jordan is a 38 y.o. female presents to [facility] for initial evaluation regarding isolated low back pain that patient reports happened 4 months ago on August 21, 2023 from a work-related injury.    Today patient reports moderate to severe pain rated 8 out of 10 and interfere with activities.  Pain is nearly constant 60 to 95% of the time that is present throughout the day and night.  Describes symptoms as a sharp, pressure-like, throbbing, dull aching pain.  Denies any significant lower extremity weakness or falls.  Does not use any durable medical equipment for ambulation.  Symptoms are worse with standing, bending.  Reports moderate relief with physical therapy and heat.  Denies smoking, marijuana or alcohol use.  Not currently taking anything for pain.  Presents today for initial evaluation.    I have personally reviewed and/or updated the patient's past medical history, past surgical history, family history, social history, current medications, allergies, and vital  signs today.     Review of Systems   Constitutional:  Negative for fever and unexpected weight change.   HENT:  Negative for trouble swallowing.    Eyes:  Negative for visual disturbance.   Respiratory:  Negative for shortness of breath and wheezing.    Cardiovascular:  Negative for chest pain and palpitations.   Gastrointestinal:  Negative for constipation, diarrhea, nausea and vomiting.   Endocrine: Negative for cold intolerance, heat intolerance and polydipsia.   Genitourinary:  Negative for difficulty urinating and frequency.   Musculoskeletal:  Positive for back pain, gait problem and joint swelling. Negative for arthralgias and myalgias.   Skin:  Negative for rash.   Neurological:  Negative for dizziness, seizures, syncope, weakness and headaches.   Hematological:  Does not bruise/bleed easily.   Psychiatric/Behavioral:  Negative for dysphoric mood.    All other systems reviewed and are negative.      Patient Active Problem List   Diagnosis   • Possible exposure to STD   • Close exposure to COVID-19 virus   • COVID-19 virus infection   • Encounter for gynecological examination without abnormal finding   • Encounter for screening mammogram for malignant neoplasm of breast   • PCOS (polycystic ovarian syndrome)   • Annual physical exam   • Class 2 obesity due to excess calories without serious comorbidity with body mass index (BMI) of 39.0 to 39.9 in adult   • Hypopigmentation   • Rash   • Chronic bilateral low back pain without sciatica   • Right knee pain   • Left ankle injury       Past Medical History:   Diagnosis Date   • Chlamydia    • Maternal morbid obesity in third trimester, antepartum (HCC) 2/9/2021   • Obesity in pregnancy, antepartum, third trimester 12/29/2020   • Varicella        Past Surgical History:   Procedure Laterality Date   • VAGINAL DELIVERY     • WISDOM TOOTH EXTRACTION         Family History   Problem Relation Age of Onset   • Hypertension Mother    • Hypertension Father    •  Hyperlipidemia Father    • No Known Problems Sister    • No Known Problems Son    • No Known Problems Son    • No Known Problems Son    • Heart failure Maternal Grandfather    • Pneumonia Paternal Grandmother    • Heart failure Paternal Grandfather    • Breast cancer Paternal Aunt         Per pt dx in her 50's       Social History     Occupational History   • Not on file   Tobacco Use   • Smoking status: Former     Current packs/day: 0.00     Average packs/day: 0.5 packs/day for 10.0 years (5.0 ttl pk-yrs)     Types: Cigarettes     Start date: 2005     Quit date: 2015     Years since quittin.0   • Smokeless tobacco: Never   Vaping Use   • Vaping status: Never Used   Substance and Sexual Activity   • Alcohol use: Not Currently   • Drug use: Never   • Sexual activity: Yes     Partners: Male     Birth control/protection: None       Current Outpatient Medications on File Prior to Visit   Medication Sig   • Prenatal Vit-Fe Fumarate-FA (M- Plus) 27-1 MG TABS Take 1 tablet by mouth in the morning     No current facility-administered medications on file prior to visit.       No Known Allergies    Physical Exam    /95   Pulse 77   Wt 108 kg (239 lb)   BMI 38.58 kg/m²     Constitutional: normal, well developed, well nourished, alert, in no distress and non-toxic and no overt pain behavior.  Eyes: anicteric  HEENT: grossly intact  Neck: supple, symmetric, trachea midline and no masses   Pulmonary:even and unlabored  Cardiovascular:No edema or pitting edema present  Skin:Normal without rashes or lesions and well hydrated  Psychiatric:Mood and affect appropriate  Neurologic:Cranial Nerves II-XII grossly intact  Musculoskeletal:normal gait, tenderness to palpation bilateral lumbar paraspinals and distal to PSIS, decreased active and passive range of motion with lumbar flexion and extension limited by pain, MMT 5 out of 5 bilateral lower extremities, sensation grossly tact to light touch  POSITIVE Sacral  compression testing bilaterally  POSITIVE Deric's test bilaterally  POSITIVE thigh thrust bilaterally    Imaging

## 2024-01-11 NOTE — TELEPHONE ENCOUNTER
PT was seen by Dr Garcia on 11/29 and an insurance claim form was filled out for her, however, PT said it was a wrong form, and she will ne faxing the right form to the office to be filled out.    Please f/u with PT if needed.    Thank You

## 2024-01-29 ENCOUNTER — OFFICE VISIT (OUTPATIENT)
Dept: OBGYN CLINIC | Facility: HOSPITAL | Age: 39
End: 2024-01-29
Payer: OTHER MISCELLANEOUS

## 2024-01-29 ENCOUNTER — HOSPITAL ENCOUNTER (OUTPATIENT)
Dept: RADIOLOGY | Facility: HOSPITAL | Age: 39
Discharge: HOME | End: 2024-01-29
Attending: ORTHOPAEDIC SURGERY
Payer: COMMERCIAL

## 2024-01-29 VITALS
DIASTOLIC BLOOD PRESSURE: 83 MMHG | WEIGHT: 238.1 LBS | BODY MASS INDEX: 38.27 KG/M2 | HEART RATE: 52 BPM | HEIGHT: 66 IN | SYSTOLIC BLOOD PRESSURE: 123 MMHG

## 2024-01-29 DIAGNOSIS — R52 PAIN: Primary | ICD-10-CM

## 2024-01-29 DIAGNOSIS — M53.3 SACROILIAC JOINT DYSFUNCTION: ICD-10-CM

## 2024-01-29 DIAGNOSIS — M54.50 LUMBAR BACK PAIN: ICD-10-CM

## 2024-01-29 DIAGNOSIS — R52 PAIN: ICD-10-CM

## 2024-01-29 PROCEDURE — 99214 OFFICE O/P EST MOD 30 MIN: CPT | Performed by: ORTHOPAEDIC SURGERY

## 2024-01-29 PROCEDURE — 72100 X-RAY EXAM L-S SPINE 2/3 VWS: CPT

## 2024-01-29 RX ORDER — MELOXICAM 15 MG/1
15 TABLET ORAL DAILY
Qty: 14 TABLET | Refills: 0 | Status: SHIPPED | OUTPATIENT
Start: 2024-01-29

## 2024-01-29 NOTE — PROGRESS NOTES
Assessment & Plan/Medical Decision Makin y.o. female with Back Pain, Right Gluteal Pain, and Left Gluteal Pain and imaging findings most notable for mild lumbar spondylosis        The clinical, physical and imaging findings were reviewed with the patient.  Grisel  has a constellation of findings consistent with Lumbar Myofascial Pain and Sacroiliac Joint Dysfunction in the setting of lumbar degenerative disease.  Ongoing pain since work-placed injury in 2023.  Fortunately patient remains neurologically intact and functional. Physical exam showing TTP paraspinal musculature, TTP bilateral SI joints.  We discussed the treatment options including physical therapy, at home exercises, activity modifications, chiropractic medicine, oral medications, interventional spine procedures.  At this time recommend continued conservative treatments.  Would recommend patient follow up with spine & pain management for bilateral SI joint injection. Discussed potential role of steroid injection at or near the source of pain to provide targeted relief. Patient did not require a new referral to spine & pain management today's visit.  Referral to chiropractic care for evaluation and treatment. Continue to maintain at home exercises and stretches.  Meloxicam rx sent to patient's pharmacy. Discussed reasoning for prescribing medication, proper usage, and potential side effects.  Will also place referral to Occupational Medicine for functional capacity evaluation.  Patient instructed to return to office/ER sooner if symptoms are not improving, getting worse, or new worrisome/neurologic symptoms arise.  Patient will follow up as needed.      Subjective:      Chief Complaint: Back Pain    HPI:  Grisel Jordan is a 38 y.o. female presenting for initial visit with chief complaint of back pain, work-related injury. 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  Denies back  pain prior. Describes pain and soreness across her low back and into her bilateral gluteal region, worse with prolonged standing, walking and sitting. She has not been able to return to work due to being unable to sit for prolonged period of time. Denies radiation of pain into lower extremities. Denies lower extremity weakness. Denies any new chris trauma. Denies fever or chills, no night sweats. Denies any bladder or bowel changes.      Denies heart or lung disease. Denies diabetes or kidney disease,    Conservative therapy includes the following:   Medications: advil as needed. Not currently taking medications.    Injections: denies, but has had evaluation by Dr. Tejeda     Physical Therapy: has been in physical therapy without relief  Chiropractic Medicine: has not attempted  Accupunture/Massage Therapy: has not attempted   These therapeutic modalities were ineffective at providing sustained pain relief/functional improvement.     Nicotine dependent: denies  Occupation: has been out of work since injury  Living situation: Lives with family   ADLs: patient is able to perform     Objective:     Family History   Problem Relation Age of Onset   • Hypertension Mother    • Hypertension Father    • Hyperlipidemia Father    • No Known Problems Sister    • No Known Problems Son    • No Known Problems Son    • No Known Problems Son    • Heart failure Maternal Grandfather    • Pneumonia Paternal Grandmother    • Heart failure Paternal Grandfather    • Breast cancer Paternal Aunt         Per pt dx in her 50's       Past Medical History:   Diagnosis Date   • Chlamydia    • Maternal morbid obesity in third trimester, antepartum (HCC) 2/9/2021   • Obesity in pregnancy, antepartum, third trimester 12/29/2020   • Varicella        Current Outpatient Medications   Medication Sig Dispense Refill   • meloxicam (Mobic) 15 mg tablet Take 1 tablet (15 mg total) by mouth daily Please take one tablet daily in the morning with food. Please  do not take any other anti-inflammatory medications such as Aleve (Naproxen), Motrin (Ibuprofen), or Advil (Ibuprofen) while taking meloxicam. 14 tablet 0   • Prenatal Vit-Fe Fumarate-FA (M-Henna Plus) 27-1 MG TABS Take 1 tablet by mouth in the morning (Patient not taking: Reported on 2024) 90 tablet 3     No current facility-administered medications for this visit.       Past Surgical History:   Procedure Laterality Date   • VAGINAL DELIVERY     • WISDOM TOOTH EXTRACTION         Social History     Socioeconomic History   • Marital status: /Civil Union     Spouse name: Will   • Number of children: 2   • Years of education: 12   • Highest education level: High school graduate   Occupational History   • Not on file   Tobacco Use   • Smoking status: Former     Current packs/day: 0.00     Average packs/day: 0.5 packs/day for 10.0 years (5.0 ttl pk-yrs)     Types: Cigarettes     Start date: 2005     Quit date: 2015     Years since quittin.0   • Smokeless tobacco: Never   Vaping Use   • Vaping status: Never Used   Substance and Sexual Activity   • Alcohol use: Not Currently   • Drug use: Never   • Sexual activity: Yes     Partners: Male     Birth control/protection: None   Other Topics Concern   • Not on file   Social History Narrative   • Not on file     Social Determinants of Health     Financial Resource Strain: Low Risk  (2023)    Overall Financial Resource Strain (CARDIA)    • Difficulty of Paying Living Expenses: Not hard at all   Food Insecurity: No Food Insecurity (2023)    Hunger Vital Sign    • Worried About Running Out of Food in the Last Year: Never true    • Ran Out of Food in the Last Year: Never true   Transportation Needs: No Transportation Needs (2023)    PRAPARE - Transportation    • Lack of Transportation (Medical): No    • Lack of Transportation (Non-Medical): No   Physical Activity: Insufficiently Active (2021)    Exercise Vital Sign    • Days of Exercise  "per Week: 1 day    • Minutes of Exercise per Session: 60 min   Stress: No Stress Concern Present (7/14/2021)    [employer] [place] [employer] - Occupational Stress Questionnaire    • Feeling of Stress : Not at all   Social Connections: Moderately Integrated (7/14/2021)    Social Connection and Isolation Panel [NHANES]    • Frequency of Communication with Friends and Family: More than three times a week    • Frequency of Social Gatherings with Friends and Family: Twice a week    • Attends Buddhism Services: 1 to 4 times per year    • Active Member of Clubs or Organizations: No    • Attends Club or Organization Meetings: Never    • Marital Status:    Intimate Partner Violence: Not At Risk (1/30/2023)    Humiliation, Afraid, Rape, and Kick questionnaire    • Fear of Current or Ex-Partner: No    • Emotionally Abused: No    • Physically Abused: No    • Sexually Abused: No   Housing Stability: Unknown (1/30/2023)    Housing Stability Vital Sign    • Unable to Pay for Housing in the Last Year: Not on file    • Number of Places Lived in the Last Year: 1    • Unstable Housing in the Last Year: No       No Known Allergies    Review of Systems  General- denies fever/chills  HEENT- denies hearing loss or sore throat  Eyes- denies eye pain or visual disturbances, denies red eyes  Respiratory- denies cough or SOB  Cardio- denies chest pain or palpitations  GI- denies abdominal pain  Endocrine- denies urinary frequency  Urinary- denies pain with urination  Musculoskeletal- Negative except noted above  Skin- denies rashes or wounds  Neurological- denies dizziness or headache  Psychiatric- denies anxiety or difficulty concentrating    Physical Exam  /83   Pulse (!) 52   Ht 5' 6\" (1.676 m)   Wt 108 kg (238 lb 1.6 oz)   BMI 38.43 kg/m²     General/Constitutional: No apparent distress: well-nourished and well developed.  Lymphatic: No appreciable lymphadenopathy  Respiratory: Non-labored " "breathing  Vascular: No edema, swelling or tenderness, except as noted in detailed exam.  Integumentary: No impressive skin lesions present, except as noted in detailed exam.  Psych: Normal mood and affect, oriented to person, place and time.  MSK: normal other than stated in HPI and exam  Gait & balance: no evidence of myelopathic gait, ambulates Independently     Lumbar spine range of motion:  -Forward flexion to 90  -Extension to neutral  -Lateral bend 25 right, 25 left  -Rotation 25 right, 25 left  There tenderness with palpation along lumbar paraspinal musculature, no midline tenderness     Neurologic:    Lower Extremity Motor Function    Right  Left    Iliopsoas  5/5  5/5    Quadriceps 5/5 5/5   Tibialis anterior  5/5  5/5    EHL  5/5  5/5    Gastroc. muscle  5/5  5/5    Heel rise  5/5  5/5    Toe rise  5/5  5/5      Sensory: light touch is intact to bilateral upper and lower extremities     Reflexes:    Right Left   Patellar 1+ 1+   Achilles 1+ 1+   Babinski neg neg     Other tests:  Straight Leg Raise: negative  Ever SI: positive  GRADY SI: negative  Greater troch: no tenderness   Internal/external hip ROM: intact, no pain   Flexion/extension knee ROM: intact, no pain   Vascular: WWP extremities, 2+DP bilateral      Diagnostic Tests   IMAGING: I have personally reviewed the images and these are my findings:  Lumbar Spine X-rays from 1/11/2024 and 1/29/2024: lumbar spondylosis with mild loss of disc height, no apparent spondylolisthesis, no appreciated lytic/blastic lesions, no obvious instability    Electronic Medical Records were reviewed including pain medicine office notes, PCP notes, imaging studies.    Procedures, if performed today     None performed       Portions of the record may have been created with voice recognition software.  Occasional wrong word or \"sound a like\" substitutions may have occurred due to the inherent limitations of voice recognition software.  Read the chart carefully and " recognize, using context, where substitutions have occurred.

## 2024-02-21 PROBLEM — Z01.419 ENCOUNTER FOR GYNECOLOGICAL EXAMINATION WITHOUT ABNORMAL FINDING: Status: RESOLVED | Noted: 2021-07-14 | Resolved: 2024-02-21

## 2024-02-26 ENCOUNTER — APPOINTMENT (OUTPATIENT)
Dept: URGENT CARE | Facility: CLINIC | Age: 39
End: 2024-02-26

## 2024-03-01 ENCOUNTER — OFFICE VISIT (OUTPATIENT)
Dept: OBGYN CLINIC | Facility: CLINIC | Age: 39
End: 2024-03-01

## 2024-03-01 VITALS
DIASTOLIC BLOOD PRESSURE: 80 MMHG | SYSTOLIC BLOOD PRESSURE: 121 MMHG | RESPIRATION RATE: 18 BRPM | BODY MASS INDEX: 40.66 KG/M2 | WEIGHT: 253 LBS | HEART RATE: 69 BPM | HEIGHT: 66 IN

## 2024-03-01 DIAGNOSIS — Z12.31 ENCOUNTER FOR SCREENING MAMMOGRAM FOR MALIGNANT NEOPLASM OF BREAST: ICD-10-CM

## 2024-03-01 DIAGNOSIS — Z32.02 PREGNANCY TEST NEGATIVE: ICD-10-CM

## 2024-03-01 DIAGNOSIS — N39.45 CONTINUOUS LEAKAGE OF URINE: ICD-10-CM

## 2024-03-01 DIAGNOSIS — R30.0 DYSURIA: Primary | ICD-10-CM

## 2024-03-01 DIAGNOSIS — R39.9 UTI SYMPTOMS: ICD-10-CM

## 2024-03-01 DIAGNOSIS — B37.9 YEAST INFECTION: ICD-10-CM

## 2024-03-01 LAB
SL AMB  POCT GLUCOSE, UA: NORMAL
SL AMB LEUKOCYTE ESTERASE,UA: NORMAL
SL AMB POCT BILIRUBIN,UA: NORMAL
SL AMB POCT BLOOD,UA: NORMAL
SL AMB POCT CLARITY,UA: CLEAR
SL AMB POCT COLOR,UA: YELLOW
SL AMB POCT KETONES,UA: NORMAL
SL AMB POCT NITRITE,UA: NORMAL
SL AMB POCT PH,UA: 5
SL AMB POCT SPECIFIC GRAVITY,UA: 1.01
SL AMB POCT URINE HCG: NEGATIVE
SL AMB POCT URINE PROTEIN: NORMAL
SL AMB POCT UROBILINOGEN: 0.2

## 2024-03-01 PROCEDURE — 81002 URINALYSIS NONAUTO W/O SCOPE: CPT | Performed by: OBSTETRICS & GYNECOLOGY

## 2024-03-01 PROCEDURE — 81025 URINE PREGNANCY TEST: CPT | Performed by: OBSTETRICS & GYNECOLOGY

## 2024-03-01 PROCEDURE — 99213 OFFICE O/P EST LOW 20 MIN: CPT | Performed by: OBSTETRICS & GYNECOLOGY

## 2024-03-01 PROCEDURE — 87086 URINE CULTURE/COLONY COUNT: CPT | Performed by: OBSTETRICS & GYNECOLOGY

## 2024-03-01 RX ORDER — FLUCONAZOLE 150 MG/1
150 TABLET ORAL ONCE
Qty: 1 TABLET | Refills: 0 | Status: SHIPPED | OUTPATIENT
Start: 2024-03-01 | End: 2024-03-01

## 2024-03-01 NOTE — PROGRESS NOTES
Name: Grisel Jordan      : 1985      MRN: [id number]  Encounter Provider: Resident JAMES Madison  Encounter Date: 3/1/2024   Enc[facility] department[facility]    Assessment & Plan   Pt is a 38 YOF  who presents c/o vaginal discomfort and LLQ ab pain. Pt states vaginal discomfort is a dull burning sensation which she describes as similar to prior yeast infections. LLQ abdominal pain described as 5/10 at worst, intermittent, sporadic, and not worsened/improved by any intervention. POCT UA and upreg WNL, urine culture and G/C sent. Wet mount negative for hyphae or clue cells, will treat symptomatically as yeast infection given history and similar symptoms to prior. Pt additionally expressed interest in becoming pregnant again, will require significant counseling +/- fertility assistance given age and comorbidities. Orders as below, RTC in 2 weeks for prenatal discussion/reassessment of current symptoms.     1. Dysuria  -     Chlamydia/GC amplified DNA by PCR; Future    2. Yeast infection  -     fluconazole (DIFLUCAN) 150 mg tablet; Take 1 tablet (150 mg total) by mouth once for 1 dose    3. UTI symptoms  -     POCT urine dip  -     Urine culture    4. Pregnancy test negative  -     POCT urine HCG    5. Continuous leakage of urine  -     Ambulatory Referral to Physical Therapy; Future    6. Encounter for screening mammogram for malignant neoplasm of breast  -     Mammo screening bilateral w 3d & cad; Future           Subjective     Vaginal burning, no itching, for past 2 weeks  Pain in LLQ, dull and non-radiating   5/10, comes and goes, no improvement/worsening, no significant modifying factors    LMP: very irregular but last in 10/2023  Ammonia-like smell  Occasional discharge, thin and not especially malodorous     Review of Systems   Constitutional:  Negative for chills and fever.   HENT:  Negative for ear pain and sore throat.    Eyes:  Negative for pain and  visual disturbance.   Respiratory:  Negative for cough and shortness of breath.    Cardiovascular:  Negative for chest pain and palpitations.   Gastrointestinal:  Positive for abdominal pain. Negative for constipation, diarrhea, nausea and vomiting.   Genitourinary:  Positive for vaginal pain. Negative for difficulty urinating, dyspareunia, dysuria, flank pain, frequency, hematuria, pelvic pain and vaginal discharge.   Musculoskeletal:  Negative for arthralgias and back pain.   Skin:  Negative for color change and rash.   Neurological:  Negative for seizures and syncope.   Psychiatric/Behavioral:  Negative for agitation.    All other systems reviewed and are negative.    Past Medical History:   Diagnosis Date   • Chlamydia    • Maternal morbid obesity in third trimester, antepartum (HCC) 2021   • Obesity in pregnancy, antepartum, third trimester 2020   • Varicella      Past Surgical History:   Procedure Laterality Date   • VAGINAL DELIVERY     • WISDOM TOOTH EXTRACTION       Family History   Problem Relation Age of Onset   • Hypertension Mother    • Hypertension Father    • Hyperlipidemia Father    • No Known Problems Sister    • No Known Problems Son    • No Known Problems Son    • No Known Problems Son    • Heart failure Maternal Grandfather    • Pneumonia Paternal Grandmother    • Heart failure Paternal Grandfather    • Breast cancer Paternal Aunt         Per pt dx in her 50's     Social History     Socioeconomic History   • Marital status: /Civil Union     Spouse name: Will   • Number of children: 2   • Years of education: 12   • Highest education level: High school graduate   Occupational History   • None   Tobacco Use   • Smoking status: Former     Current packs/day: 0.00     Average packs/day: 0.5 packs/day for 10.0 years (5.0 ttl pk-yrs)     Types: Cigarettes     Start date: 2005     Quit date: 2015     Years since quittin.1   • Smokeless tobacco: Never   Vaping Use   • Vaping  status: Never Used   Substance and Sexual Activity   • Alcohol use: Not Currently   • Drug use: Never   • Sexual activity: Yes     Partners: Male     Birth control/protection: None   Other Topics Concern   • None   Social History Narrative   • None     Social Determinants of Health     Financial Resource Strain: Low Risk  (1/30/2023)    Overall Financial Resource Strain (CARDIA)    • Difficulty of Paying Living Expenses: Not hard at all   Food Insecurity: No Food Insecurity (1/30/2023)    Hunger Vital Sign    • Worried About Running Out of Food in the Last Year: Never true    • Ran Out of Food in the Last Year: Never true   Transportation Needs: No Transportation Needs (1/30/2023)    PRAPARE - Transportation    • Lack of Transportation (Medical): No    • Lack of Transportation (Non-Medical): No   Physical Activity: Insufficiently Active (7/14/2021)    Exercise Vital Sign    • Days of Exercise per Week: 1 day    • Minutes of Exercise per Session: 60 min   Stress: No Stress Concern Present (7/14/2021)    [employer] [place] [employer] - Occupational Stress Questionnaire    • Feeling of Stress : Not at all   Social Connections: Moderately Integrated (7/14/2021)    Social Connection and Isolation Panel [NHANES]    • Frequency of Communication with Friends and Family: More than three times a week    • Frequency of Social Gatherings with Friends and Family: Twice a week    • Attends Holiness Services: 1 to 4 times per year    • Active Member of Clubs or Organizations: No    • Attends Club or Organization Meetings: Never    • Marital Status:    Intimate Partner Violence: Not At Risk (1/30/2023)    Humiliation, Afraid, Rape, and Kick questionnaire    • Fear of Current or Ex-Partner: No    • Emotionally Abused: No    • Physically Abused: No    • Sexually Abused: No   Housing Stability: Unknown (1/30/2023)    Housing Stability Vital Sign    • Unable to Pay for Housing in the Last Year: Not on file    •  "Number of Places Lived in the Last Year: 1    • Unstable Housing in the Last Year: No     Current Outpatient Medications on File Prior to Visit   Medication Sig   • meloxicam (Mobic) 15 mg tablet Take 1 tablet (15 mg total) by mouth daily Please take one tablet daily in the morning with food. Please do not take any other anti-inflammatory medications such as Aleve (Naproxen), Motrin (Ibuprofen), or Advil (Ibuprofen) while taking meloxicam. (Patient not taking: Reported on 3/1/2024)   • Prenatal Vit-Fe Fumarate-FA (M- Plus) 27-1 MG TABS Take 1 tablet by mouth in the morning (Patient not taking: Reported on 2024)     No Known Allergies  Immunization History   Administered Date(s) Administered   • Influenza, injectable, quadrivalent, preservative free 0.5 mL 2020, 2020   • Tdap 2020       Objective     /80 (BP Location: Left arm, Patient Position: Sitting, Cuff Size: Adult)   Pulse 69   Resp 18   Ht 5' 6\" (1.676 m)   Wt 115 kg (253 lb)   LMP 10/13/2023 (Exact Date)   BMI 40.84 kg/m²     Physical Exam  Vitals and nursing note reviewed. Exam conducted with a chaperone present.   Constitutional:       General: She is not in acute distress.     Appearance: Normal appearance. She is not ill-appearing.   HENT:      Head: Normocephalic and atraumatic.      Right Ear: External ear normal.      Left Ear: External ear normal.      Nose: Nose normal.      Mouth/Throat:      Mouth: Mucous membranes are moist.      Pharynx: Oropharynx is clear.   Eyes:      General: No scleral icterus.     Extraocular Movements: Extraocular movements intact.      Conjunctiva/sclera: Conjunctivae normal.   Abdominal:      General: Abdomen is flat.      Palpations: Abdomen is soft. There is no mass.      Tenderness: There is no abdominal tenderness. There is no guarding.   Genitourinary:     General: Normal vulva.      Vagina: No vaginal discharge.      Rectum: Normal.   Neurological:      Mental Status: She is " alert.   Resident JAMES Madison

## 2024-03-03 LAB — BACTERIA UR CULT: NORMAL

## 2024-03-05 ENCOUNTER — TELEPHONE (OUTPATIENT)
Dept: OBGYN CLINIC | Facility: CLINIC | Age: 39
End: 2024-03-05

## 2024-03-06 ENCOUNTER — APPOINTMENT (OUTPATIENT)
Dept: URGENT CARE | Facility: CLINIC | Age: 39
End: 2024-03-06
Payer: OTHER MISCELLANEOUS

## 2024-03-06 PROCEDURE — 99215 OFFICE O/P EST HI 40 MIN: CPT | Performed by: FAMILY MEDICINE

## 2024-03-06 PROCEDURE — 99213 OFFICE O/P EST LOW 20 MIN: CPT | Performed by: FAMILY MEDICINE

## 2024-03-06 NOTE — TELEPHONE ENCOUNTER
Attempted to call, left a message asking for patient to call the office. Office number provided in message.

## 2024-03-19 ENCOUNTER — TELEPHONE (OUTPATIENT)
Dept: OBGYN CLINIC | Facility: HOSPITAL | Age: 39
End: 2024-03-19

## 2024-03-19 ENCOUNTER — OFFICE VISIT (OUTPATIENT)
Dept: OBGYN CLINIC | Facility: HOSPITAL | Age: 39
End: 2024-03-19
Payer: OTHER MISCELLANEOUS

## 2024-03-19 VITALS
HEART RATE: 79 BPM | BODY MASS INDEX: 40.75 KG/M2 | SYSTOLIC BLOOD PRESSURE: 153 MMHG | DIASTOLIC BLOOD PRESSURE: 86 MMHG | HEIGHT: 66 IN | WEIGHT: 253.53 LBS

## 2024-03-19 DIAGNOSIS — M54.50 LUMBAR BACK PAIN: Primary | ICD-10-CM

## 2024-03-19 PROCEDURE — 99212 OFFICE O/P EST SF 10 MIN: CPT | Performed by: ORTHOPAEDIC SURGERY

## 2024-03-19 NOTE — PROGRESS NOTES
Assessment & Plan/Medical Decision Makin y.o. female with Back Pain, Right Gluteal Pain, and Left Gluteal Pain and imaging findings most notable for mild lumbar spondylosis        The clinical, physical and imaging findings were reviewed with the patient.  Grisel  has a constellation of findings consistent with Lumbar Myofascial Pain and Sacroiliac Joint Dysfunction in the setting of lumbar degenerative disease.  Ongoing pain since work-place injury in 2023.    Fortunately patient remains neurologically intact and functional. Physical exam showing TTP paraspinal musculature, TTP bilateral SI joints.    We discussed the treatment options including physical therapy, at home exercises, activity modifications, chiropractic medicine, oral medications, interventional spine procedures.  At this time recommend continued conservative treatments.    Would recommend patient follow up with spine & pain management for bilateral SI joint injection. Discussed potential role of steroid injection at or near the source of pain to provide targeted relief. Patient did not require a new referral to spine & pain management today's visit.  Referral to chiropractic care for evaluation and treatment. Continue to maintain at home exercises and stretches. This referral was previously placed and she does not require a new referral.  Will defer all restrictions and disability forms to occupational medicine.    Patient instructed to return to office/ER sooner if symptoms are not improving, getting worse, or new worrisome/neurologic symptoms arise.  Patient will follow up as needed.      Subjective:      Chief Complaint: Back Pain    HPI:  Grisel Jordan is a 38 y.o. female presenting for initial visit with chief complaint of back pain, work-related injury. 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  Denies back pain prior. Describes pain and soreness across her low back  and into her bilateral gluteal region, worse with prolonged standing, walking and sitting. She has not been able to return to work due to being unable to sit for prolonged period of time. Denies radiation of pain into lower extremities. Denies lower extremity weakness. Denies any new chris trauma. Denies fever or chills, no night sweats. Denies any bladder or bowel changes.      Denies heart or lung disease. Denies diabetes or kidney disease,    Conservative therapy includes the following:   Medications: advil as needed. Not currently taking medications.    Injections: denies, but has had evaluation by Dr. Tejeda     Physical Therapy: has been in physical therapy without relief  Chiropractic Medicine: has not attempted  Accupunture/Massage Therapy: has not attempted   These therapeutic modalities were ineffective at providing sustained pain relief/functional improvement.     Nicotine dependent: denies  Occupation: has been out of work since injury  Living situation: Lives with family   ADLs: patient is able to perform     Update 3/19/24:  Patient is here for follow up. She saw occupational medicine and was told to first obtain an MRI before pursuing our prior recommendations of chiropractic care or SI injections. She has not yet obtained an MRI. She did not trial chiro or have any SI injections as of yet. She previously had done multiple months of PT without relief.    Objective:     Family History   Problem Relation Age of Onset    Hypertension Mother     Hypertension Father     Hyperlipidemia Father     No Known Problems Sister     No Known Problems Son     No Known Problems Son     No Known Problems Son     Heart failure Maternal Grandfather     Pneumonia Paternal Grandmother     Heart failure Paternal Grandfather     Breast cancer Paternal Aunt         Per pt dx in her 50's       Past Medical History:   Diagnosis Date    Chlamydia     Maternal morbid obesity in third trimester, antepartum (HCC) 2/9/2021     Obesity in pregnancy, antepartum, third trimester 2020    Varicella        Current Outpatient Medications   Medication Sig Dispense Refill    meloxicam (Mobic) 15 mg tablet Take 1 tablet (15 mg total) by mouth daily Please take one tablet daily in the morning with food. Please do not take any other anti-inflammatory medications such as Aleve (Naproxen), Motrin (Ibuprofen), or Advil (Ibuprofen) while taking meloxicam. (Patient not taking: Reported on 3/1/2024) 14 tablet 0    Prenatal Vit-Fe Fumarate-FA (M-Henna Plus) 27-1 MG TABS Take 1 tablet by mouth in the morning (Patient not taking: Reported on 2024) 90 tablet 3     No current facility-administered medications for this visit.       Past Surgical History:   Procedure Laterality Date    VAGINAL DELIVERY      WISDOM TOOTH EXTRACTION         Social History     Socioeconomic History    Marital status: /Civil Union     Spouse name: Will    Number of children: 2    Years of education: 12    Highest education level: High school graduate   Occupational History    Not on file   Tobacco Use    Smoking status: Former     Current packs/day: 0.00     Average packs/day: 0.5 packs/day for 10.0 years (5.0 ttl pk-yrs)     Types: Cigarettes     Start date: 2005     Quit date: 2015     Years since quittin.2    Smokeless tobacco: Never   Vaping Use    Vaping status: Never Used   Substance and Sexual Activity    Alcohol use: Not Currently    Drug use: Never    Sexual activity: Yes     Partners: Male     Birth control/protection: None   Other Topics Concern    Not on file   Social History Narrative    Not on file     Social Determinants of Health     Financial Resource Strain: Low Risk  (2023)    Overall Financial Resource Strain (CARDIA)     Difficulty of Paying Living Expenses: Not hard at all   Food Insecurity: No Food Insecurity (2023)    Hunger Vital Sign     Worried About Running Out of Food in the Last Year: Never true     Ran Out of Food  in the Last Year: Never true   Transportation Needs: No Transportation Needs (1/30/2023)    PRAPARE - Transportation     Lack of Transportation (Medical): No     Lack of Transportation (Non-Medical): No   Physical Activity: Insufficiently Active (7/14/2021)    Exercise Vital Sign     Days of Exercise per Week: 1 day     Minutes of Exercise per Session: 60 min   Stress: No Stress Concern Present (7/14/2021)    [employer] [place] [employer] - Occupational Stress Questionnaire     Feeling of Stress : Not at all   Social Connections: Moderately Integrated (7/14/2021)    Social Connection and Isolation Panel [NHANES]     Frequency of Communication with Friends and Family: More than three times a week     Frequency of Social Gatherings with Friends and Family: Twice a week     Attends Caodaism Services: 1 to 4 times per year     Active Member of Clubs or Organizations: No     Attends Club or Organization Meetings: Never     Marital Status:    Intimate Partner Violence: Not At Risk (1/30/2023)    Humiliation, Afraid, Rape, and Kick questionnaire     Fear of Current or Ex-Partner: No     Emotionally Abused: No     Physically Abused: No     Sexually Abused: No   Housing Stability: Unknown (1/30/2023)    Housing Stability Vital Sign     Unable to Pay for Housing in the Last Year: Not on file     Number of Places Lived in the Last Year: 1     Unstable Housing in the Last Year: No       No Known Allergies    Review of Systems  General- denies fever/chills  HEENT- denies hearing loss or sore throat  Eyes- denies eye pain or visual disturbances, denies red eyes  Respiratory- denies cough or SOB  Cardio- denies chest pain or palpitations  GI- denies abdominal pain  Endocrine- denies urinary frequency  Urinary- denies pain with urination  Musculoskeletal- Negative except noted above  Skin- denies rashes or wounds  Neurological- denies dizziness or headache  Psychiatric- denies anxiety or difficulty  "concentrating    Physical Exam  /86   Pulse 79   Ht 5' 6\" (1.676 m)   Wt 115 kg (253 lb 8.5 oz)   LMP 10/13/2023 (Exact Date)   BMI 40.92 kg/m²     General/Constitutional: No apparent distress: well-nourished and well developed.  Lymphatic: No appreciable lymphadenopathy  Respiratory: Non-labored breathing  Vascular: No edema, swelling or tenderness, except as noted in detailed exam.  Integumentary: No impressive skin lesions present, except as noted in detailed exam.  Psych: Normal mood and affect, oriented to person, place and time.  MSK: normal other than stated in HPI and exam  Gait & balance: no evidence of myelopathic gait, ambulates Independently     Lumbar spine range of motion:  -Forward flexion to 90  -Extension to neutral  -Lateral bend 25 right, 25 left  -Rotation 25 right, 25 left  There tenderness with palpation along lumbar paraspinal musculature, no midline tenderness     Neurologic:    Lower Extremity Motor Function    Right  Left    Iliopsoas  5/5  5/5    Quadriceps 5/5 5/5   Tibialis anterior  5/5  5/5    EHL  5/5  5/5    Gastroc. muscle  5/5  5/5    Heel rise  5/5  5/5    Toe rise  5/5  5/5      Sensory: light touch is intact to bilateral upper and lower extremities     Reflexes:    Right Left   Patellar 1+ 1+   Achilles 1+ 1+   Babinski neg neg     Other tests:  Straight Leg Raise: negative  Ever SI: positive  GRADY SI: positive   Greater troch: no tenderness   Internal/external hip ROM: intact, no pain   Flexion/extension knee ROM: intact, no pain   Vascular: WWP extremities, 2+DP bilateral      Diagnostic Tests   IMAGING: I have personally reviewed the images and these are my findings:  Lumbar Spine X-rays from 1/11/2024 and 1/29/2024: lumbar spondylosis with mild loss of disc height, no apparent spondylolisthesis, no appreciated lytic/blastic lesions, no obvious instability    Electronic Medical Records were reviewed including pain medicine office notes, PCP notes, imaging " "studies.    Procedures, if performed today     None performed       Portions of the record may have been created with voice recognition software.  Occasional wrong word or \"sound a like\" substitutions may have occurred due to the inherent limitations of voice recognition software.  Read the chart carefully and recognize, using context, where substitutions have occurred.  "

## 2024-04-04 ENCOUNTER — HOSPITAL ENCOUNTER (OUTPATIENT)
Dept: MRI IMAGING | Facility: HOSPITAL | Age: 39
End: 2024-04-04
Payer: OTHER MISCELLANEOUS

## 2024-04-04 DIAGNOSIS — M54.50 LUMBAR BACK PAIN: ICD-10-CM

## 2024-04-04 PROCEDURE — 72148 MRI LUMBAR SPINE W/O DYE: CPT

## 2024-05-03 ENCOUNTER — TELEPHONE (OUTPATIENT)
Age: 39
End: 2024-05-03

## 2024-05-07 ENCOUNTER — TELEPHONE (OUTPATIENT)
Dept: OBGYN CLINIC | Facility: HOSPITAL | Age: 39
End: 2024-05-07

## 2024-05-07 ENCOUNTER — TELEPHONE (OUTPATIENT)
Age: 39
End: 2024-05-07

## 2024-05-07 NOTE — TELEPHONE ENCOUNTER
Caller: patient     Doctor: Leticia    Reason for call: asking if [employer] forms faxed today have been received, nothing in EPIC yet, adv to allow 24 hrs.     Call back#: [phone]

## 2024-05-15 ENCOUNTER — TELEPHONE (OUTPATIENT)
Dept: OBGYN CLINIC | Facility: HOSPITAL | Age: 39
End: 2024-05-15

## 2024-05-15 NOTE — TELEPHONE ENCOUNTER
Caller: Patient    Doctor: Leticia    Reason for call: Patient calling to see if we received the VERONICA report from an outside testing center.  As of today, it is not scanned into the patient's chart.  She will call back in a few days to check again.    Call back#: n/a

## 2024-05-20 ENCOUNTER — TELEMEDICINE (OUTPATIENT)
Dept: FAMILY MEDICINE CLINIC | Facility: CLINIC | Age: 39
End: 2024-05-20
Payer: COMMERCIAL

## 2024-05-20 VITALS — WEIGHT: 240 LBS | BODY MASS INDEX: 38.57 KG/M2 | TEMPERATURE: 101 F | HEIGHT: 66 IN

## 2024-05-20 DIAGNOSIS — J06.9 ACUTE URI: Primary | ICD-10-CM

## 2024-05-20 PROCEDURE — 99213 OFFICE O/P EST LOW 20 MIN: CPT

## 2024-05-20 NOTE — PROGRESS NOTES
Virtual Regular Visit    Verification of patient location:    Patient is located at Home in the following state in which I hold an active license [place]      Assessment/Plan:    Problem List Items Addressed This Visit          Respiratory    Acute URI - Primary     Sore throat, body aches and fever x 24 hrs recommend r/o COVID,flu, strep. Pt unable to  come to office for testing, recommend alternate tylenol/ibuprofen q 6hrs prn, increased fluids, at home COVID testing and must seek immediate medical care for worsening sxs.                  Reason for visit is   Chief Complaint   Patient presents with    Sore Throat    Fever     Started on 5/19 body ache as well     Virtual Regular Visit        Encounter provider JAMEY Diaz      Recent Visits  No visits were found meeting these conditions.  Showing recent visits within past 7 days and meeting all other requirements  Today's Visits  Date Type Provider Dept   05/20/24 Telemedicine JAMEY Diaz [facility]   Showing today's visits and meeting all other requirements  Future Appointments  No visits were found meeting these conditions.  Showing future appointments within next 150 days and meeting all other requirements       The patient was identified by name and date of birth. Grisel Jordan was informed that this is a telemedicine visit and that the visit is being conducted through the Epic Embedded platform. She agrees to proceed..  My office door was closed. No one else was in the room.  She acknowledged consent and understanding of privacy and security of the video platform. The patient has agreed to participate and understands they can discontinue the visit at any time.    Patient is aware this is a billable service.     Subjective  Grisel Jordan is a 39 y.o. female  .      Pt with c/o sore throat, fever of 101 and body aches x 24 hrs. Recommend r/o COVID, flu and strep pt states unable to  come to office for testing.  "         Past Medical History:   Diagnosis Date    Chlamydia     Maternal morbid obesity in third trimester, antepartum (HCC) 2021    Obesity in pregnancy, antepartum, third trimester 2020    Varicella        Past Surgical History:   Procedure Laterality Date    VAGINAL DELIVERY      WISDOM TOOTH EXTRACTION         Current Outpatient Medications   Medication Sig Dispense Refill    meloxicam (Mobic) 15 mg tablet Take 1 tablet (15 mg total) by mouth daily Please take one tablet daily in the morning with food. Please do not take any other anti-inflammatory medications such as Aleve (Naproxen), Motrin (Ibuprofen), or Advil (Ibuprofen) while taking meloxicam. (Patient not taking: Reported on 3/1/2024) 14 tablet 0    Prenatal Vit-Fe Fumarate-FA (M- Plus) 27-1 MG TABS Take 1 tablet by mouth in the morning (Patient not taking: Reported on 2024) 90 tablet 3     No current facility-administered medications for this visit.        No Known Allergies    Review of Systems   Constitutional:  Positive for chills and fever.   HENT:  Positive for sore throat. Negative for congestion, ear discharge, ear pain, sinus pressure, sinus pain and sneezing.    Eyes:  Negative for photophobia.   Respiratory:  Negative for cough, chest tightness and shortness of breath.    Cardiovascular:  Negative for chest pain.   Gastrointestinal:  Negative for nausea and vomiting.   Musculoskeletal:  Positive for myalgias.   Neurological:  Negative for dizziness.       Video Exam    Vitals:    24 1430   Temp: (!) 101 °F (38.3 °C)   TempSrc: Tympanic   Weight: 109 kg (240 lb)   Height: 5' 6\" (1.676 m)       Physical Exam  Vitals and nursing note reviewed.   Constitutional:       General: She is not in acute distress.     Appearance: She is well-developed. She is ill-appearing. She is not toxic-appearing or diaphoretic.   HENT:      Head: Normocephalic and atraumatic.   Eyes:      Conjunctiva/sclera: Conjunctivae normal.   Pulmonary: "      Effort: Pulmonary effort is normal. No respiratory distress.   Neurological:      Mental Status: She is alert and oriented to person, place, and time.   Psychiatric:         Mood and Affect: Mood normal.         Behavior: Behavior normal.          Visit Time  Total Visit Duration: 3

## 2024-05-20 NOTE — ASSESSMENT & PLAN NOTE
Sore throat, body aches and fever x 24 hrs recommend r/o COVID,flu, strep. Pt unable to  come to office for testing, recommend alternate tylenol/ibuprofen q 6hrs prn, increased fluids, at home COVID testing and must seek immediate medical care for worsening sxs.

## 2024-06-19 PROBLEM — J06.9 ACUTE URI: Status: RESOLVED | Noted: 2024-05-20 | Resolved: 2024-06-19

## 2024-11-23 ENCOUNTER — HOSPITAL ENCOUNTER (EMERGENCY)
Facility: HOSPITAL | Age: 39
Discharge: HOME | End: 2024-11-23
Attending: EMERGENCY MEDICINE | Admitting: EMERGENCY MEDICINE

## 2024-11-23 VITALS
OXYGEN SATURATION: 98 % | HEART RATE: 94 BPM | DIASTOLIC BLOOD PRESSURE: 84 MMHG | RESPIRATION RATE: 16 BRPM | SYSTOLIC BLOOD PRESSURE: 120 MMHG | TEMPERATURE: 98.3 F

## 2024-11-23 DIAGNOSIS — J02.0 STREP PHARYNGITIS: Primary | ICD-10-CM

## 2024-11-23 LAB — S PYO DNA THROAT QL NAA+PROBE: DETECTED

## 2024-11-23 PROCEDURE — 99285 EMERGENCY DEPT VISIT HI MDM: CPT

## 2024-11-23 PROCEDURE — 99284 EMERGENCY DEPT VISIT MOD MDM: CPT

## 2024-11-23 PROCEDURE — 87651 STREP A DNA AMP PROBE: CPT | Performed by: EMERGENCY MEDICINE

## 2024-11-23 RX ORDER — AMOXICILLIN 500 MG/1
500 CAPSULE ORAL EVERY 12 HOURS SCHEDULED
Qty: 20 CAPSULE | Refills: 0 | Status: SHIPPED | OUTPATIENT
Start: 2024-11-23 | End: 2024-12-03

## 2024-11-23 RX ORDER — TRIAMCINOLONE ACETONIDE 55 UG/1
1 SPRAY, METERED NASAL DAILY
Qty: 16.9 ML | Refills: 0 | Status: SHIPPED | OUTPATIENT
Start: 2024-11-23

## 2024-11-23 NOTE — DISCHARGE INSTRUCTIONS
For congestion: Nasacort nasal sprays in the morning/night, aim away from the middle of the nose. Can bridge the doses with saline spray. NetiPot with distilled warmed water can provide further relief as well.   For sore throat: Sips of water or Pedialyte every 15 minutes to ensure hydration without inducing nausea. Teaspoon of honey. Salt water gargles. Ice pops.  For further supportive care: Rotate Tylenol and Motrin every 3 hours for best relief. For example, take Motrin then in 3 hours take Tylenol then 3 hours Motrin, repeat. Maximum Tylenol daily: 4,000 mg. Maximum ibuprofen daily: 3,600 mg.  Amoxicillin twice daily for ten days.

## 2024-11-23 NOTE — ED PROVIDER NOTES
"Time reflects when diagnosis was documented in both MDM as applicable and the Disposition within this note       Time User Action Codes Description Comment    11/23/2024  2:45 PM Kina Pruett [J02.0] Strep pharyngitis           ED Disposition       ED Disposition   Discharge    Condition   Stable    Date/Time   Sat Nov 23, 2024  2:51 PM    Comment   Grisel Jordan discharge to home/self care.                   Assessment & Plan       Medical Decision Making  39 year old with sore throat, myalgias x four days. Strep testing positive. Will treat with amoxicillin bid x ten days. Discussed supportive care. Pt stable at time of discharge, vital signs reviewed, questions answered. Strict ER return precautions provided/discussed and were well understood by patient. Patient's vitals, labs and/or imaging results, diagnosis, and treatment plan were discussed with the patient. All new and/or changed medications were discussed - specifically to include route of administration, how often to take, when to take, and the pharmacy they were sent to. Strict return precautions as well as close follow up with PCP was discussed with the patient and the patient was agreeable to my recommendations.  Patient verbally acknowledged understanding. All labs, imaging were reviewed and used in the medical decision making process (if ordered).     Portions of this chart may have been written with voice recognition software.  Occasional grammatical errors, wrong word or \"sound a like\" substitutions may have occurred due to software limitations.  Please read carefully and use context to recognize where substitutions have occurred.      Problems Addressed:  Strep pharyngitis: acute illness or injury    Amount and/or Complexity of Data Reviewed  Labs: ordered. Decision-making details documented in ED Course.    Risk  OTC drugs.  Prescription drug management.        ED Course as of 11/23/24 1703   Sat Nov 23, 2024   1453 STREP A PCR(!): " Detected       Medications - No data to display    ED Risk Strat Scores                                               History of Present Illness       Chief Complaint   Patient presents with    Sore Throat     Pt presents with sore throat x3days. +body aches and weakness. Denies fevers.        Past Medical History:   Diagnosis Date    Chlamydia     Maternal morbid obesity in third trimester, antepartum (HCC) 2021    Obesity in pregnancy, antepartum, third trimester 2020    Varicella       Past Surgical History:   Procedure Laterality Date    VAGINAL DELIVERY      WISDOM TOOTH EXTRACTION        Family History   Problem Relation Age of Onset    Hypertension Mother     Hypertension Father     Hyperlipidemia Father     No Known Problems Sister     No Known Problems Son     No Known Problems Son     No Known Problems Son     Heart failure Maternal Grandfather     Pneumonia Paternal Grandmother     Heart failure Paternal Grandfather     Breast cancer Paternal Aunt         Per pt dx in her 50's      Social History     Tobacco Use    Smoking status: Former     Current packs/day: 0.00     Average packs/day: 0.5 packs/day for 10.0 years (5.0 ttl pk-yrs)     Types: Cigarettes     Start date: 2005     Quit date: 2015     Years since quittin.9    Smokeless tobacco: Never   Vaping Use    Vaping status: Never Used   Substance Use Topics    Alcohol use: Not Currently    Drug use: Never      E-Cigarette/Vaping    E-Cigarette Use Never User       E-Cigarette/Vaping Substances    Nicotine No     THC No     CBD No     Flavoring No     Other No     Unknown No       I have reviewed and agree with the history as documented.     Grisel is a 39 year old female presenting to the ED for a sore throat, myalgias, chills x four days. No known sick contacts. Denies fevers, congestion, difficulty swallowing, difficulty breathing, cough, abdominal pain, nausea, vomiting.         Review of Systems   Constitutional:  Positive for  chills. Negative for fever.   HENT:  Positive for sore throat. Negative for congestion, rhinorrhea and trouble swallowing.    Eyes:  Negative for photophobia and visual disturbance.   Respiratory:  Negative for cough and shortness of breath.    Cardiovascular:  Negative for chest pain and palpitations.   Gastrointestinal:  Negative for abdominal pain, diarrhea, nausea and vomiting.   Musculoskeletal:  Positive for myalgias.   Neurological:  Positive for headaches.           Objective       ED Triage Vitals [11/23/24 1334]   Temperature Pulse Blood Pressure Respirations SpO2 Patient Position - Orthostatic VS   98.3 °F (36.8 °C) 94 120/84 16 98 % --      Temp Source Heart Rate Source BP Location FiO2 (%) Pain Score    Oral Monitor Right arm -- --      Vitals      Date and Time Temp Pulse SpO2 Resp BP Pain Score FACES Pain Rating User   11/23/24 1334 98.3 °F (36.8 °C) 94 98 % 16 120/84 -- -- LD            Physical Exam  Vitals reviewed.   Constitutional:       General: She is not in acute distress.     Appearance: Normal appearance. She is not ill-appearing or toxic-appearing.   HENT:      Head: Normocephalic and atraumatic.      Right Ear: Ear canal and external ear normal.      Left Ear: Ear canal and external ear normal.      Ears:      Comments: Serous otitis media     Nose: Nose normal.      Mouth/Throat:      Pharynx: Posterior oropharyngeal erythema present.      Tonsils: Tonsillar exudate present. No tonsillar abscesses.      Comments: No peritonsillar abscess, trismus, Ludwigs angina on exam. Maintaining oral secretions as normal.   Eyes:      General: No scleral icterus.        Right eye: No discharge.         Left eye: No discharge.      Extraocular Movements: Extraocular movements intact.      Conjunctiva/sclera: Conjunctivae normal.   Cardiovascular:      Rate and Rhythm: Normal rate and regular rhythm.      Pulses: Normal pulses.      Heart sounds: Normal heart sounds.   Pulmonary:      Effort: Pulmonary  effort is normal. No respiratory distress.      Breath sounds: Normal breath sounds.   Musculoskeletal:         General: Normal range of motion.      Cervical back: Normal range of motion and neck supple. No rigidity or tenderness.   Lymphadenopathy:      Cervical: Cervical adenopathy present.   Skin:     General: Skin is warm and dry.      Capillary Refill: Capillary refill takes less than 2 seconds.   Neurological:      General: No focal deficit present.      Mental Status: She is alert.   Psychiatric:         Mood and Affect: Mood normal.         Behavior: Behavior normal.         Results Reviewed       Procedure Component Value Units Date/Time    Strep A PCR [[id number]]  (Abnormal) Collected: 24 1335    Lab Status: Final result Specimen: Throat Updated: 24 1406     STREP A PCR Detected            No orders to display       Procedures    ED Medication and Procedure Management   Prior to Admission Medications   Prescriptions Last Dose Informant Patient Reported? Taking?   Prenatal Vit-Fe Fumarate-FA (M- Plus) 27-1 MG TABS  Self No No   Sig: Take 1 tablet by mouth in the morning   Patient not taking: Reported on 2024   meloxicam (Mobic) 15 mg tablet  Self No No   Sig: Take 1 tablet (15 mg total) by mouth daily Please take one tablet daily in the morning with food. Please do not take any other anti-inflammatory medications such as Aleve (Naproxen), Motrin (Ibuprofen), or Advil (Ibuprofen) while taking meloxicam.   Patient not taking: Reported on 3/1/2024      Facility-Administered Medications: None     Discharge Medication List as of 2024  2:53 PM        START taking these medications    Details   amoxicillin (AMOXIL) 500 mg capsule Take 1 capsule (500 mg total) by mouth every 12 (twelve) hours for 10 days, Starting Sat 2024, Until Tue 12/3/2024, Normal      Triamcinolone Acetonide (Nasacort Allergy 24HR) 55 MCG/ACT nasal spray 1 spray by Each Nare route daily, Starting Sat  2024, Normal           CONTINUE these medications which have NOT CHANGED    Details   meloxicam (Mobic) 15 mg tablet Take 1 tablet (15 mg total) by mouth daily Please take one tablet daily in the morning with food. Please do not take any other anti-inflammatory medications such as Aleve (Naproxen), Motrin (Ibuprofen), or Advil (Ibuprofen) while taking meloxicam., Start ing Mon 2024, Normal      Prenatal Vit-Fe Fumarate-FA (M- Plus) 27-1 MG TABS Take 1 tablet by mouth in the morning, Starting Tue 10/10/2023, Normal           No discharge procedures on file.  ED SEPSIS DOCUMENTATION   Time reflects when diagnosis was documented in both MDM as applicable and the Disposition within this note       Time User Action Codes Description Comment    2024  2:45 PM Kina Pruett Add [J02.0] Strep pharyngitis                  Kina Pruett PA-C  24

## 2025-01-15 ENCOUNTER — OFFICE VISIT (OUTPATIENT)
Age: 40
End: 2025-01-15

## 2025-01-15 VITALS
BODY MASS INDEX: 42.11 KG/M2 | DIASTOLIC BLOOD PRESSURE: 85 MMHG | SYSTOLIC BLOOD PRESSURE: 126 MMHG | HEIGHT: 66 IN | TEMPERATURE: 98.3 F | OXYGEN SATURATION: 98 % | WEIGHT: 262 LBS | RESPIRATION RATE: 16 BRPM | HEART RATE: 74 BPM

## 2025-01-15 DIAGNOSIS — E63.8 IMBALANCED NUTRITION: ICD-10-CM

## 2025-01-15 DIAGNOSIS — Z23 ENCOUNTER FOR IMMUNIZATION: ICD-10-CM

## 2025-01-15 DIAGNOSIS — H69.92 DYSFUNCTION OF LEFT EUSTACHIAN TUBE: ICD-10-CM

## 2025-01-15 DIAGNOSIS — E66.09 CLASS 2 OBESITY DUE TO EXCESS CALORIES WITHOUT SERIOUS COMORBIDITY WITH BODY MASS INDEX (BMI) OF 39.0 TO 39.9 IN ADULT: Primary | ICD-10-CM

## 2025-01-15 DIAGNOSIS — E66.812 CLASS 2 OBESITY DUE TO EXCESS CALORIES WITHOUT SERIOUS COMORBIDITY WITH BODY MASS INDEX (BMI) OF 39.0 TO 39.9 IN ADULT: Primary | ICD-10-CM

## 2025-01-15 PROCEDURE — 99204 OFFICE O/P NEW MOD 45 MIN: CPT | Performed by: FAMILY MEDICINE

## 2025-01-15 NOTE — ASSESSMENT & PLAN NOTE
Orders:    Ambulatory Referral to Weight Management; Future    Lipid panel; Future    Hemoglobin A1C; Future    Comprehensive metabolic panel; Future    TSH, 3rd generation with Free T4 reflex; Future    CBC and differential; Future

## 2025-01-15 NOTE — ASSESSMENT & PLAN NOTE
Discussed healthy nutrition. Goal is better lunch meals on the road.   Referred to Wt loss  We discussed pros and cons of semaglutide class meds, which she was interested in.

## 2025-01-15 NOTE — PROGRESS NOTES
"Assessment & Plan  Class 2 obesity due to excess calories without serious comorbidity with body mass index (BMI) of 39.0 to 39.9 in adult      Orders:    Ambulatory Referral to Weight Management; Future    Lipid panel; Future    Hemoglobin A1C; Future    Comprehensive metabolic panel; Future    TSH, 3rd generation with Free T4 reflex; Future    CBC and differential; Future    Encounter for immunization         Dysfunction of left eustachian tube  Can try pressure equalization maneuvers, Flonase, avoid resp irritants       Imbalanced nutrition  Discussed healthy nutrition. Goal is better lunch meals on the road.   Referred to Wt loss  We discussed pros and cons of semaglutide class meds, which she was interested in.          Return in about 4 weeks (around 2/12/2025) for Annual physical.      Chief concern and HPI: Grisel Jordan is a 39 y.o. female    Chief Complaint   Patient presents with    Establish Care     Pt states she has \"Left ear fluid\"  Wants to discuss weight     HPI  C/o fluid in L ear since Nov. Hearing ok. Started after a cold  Some headaches, mild. Ok now.    Previous relevant clinical information reviewed from medical, surgical and psychosocial history, medication and allergies and labs/studies.    Gained a lot of weight after three kids.     Breakfast: pancakes.    A lot of processed food, fast food  Does [employer]    Sedentary    WBC   Date/Time Value Ref Range Status   04/13/2023 08:07 AM 4.20 (L) 4.31 - 10.16 Thousand/uL Final     Hemoglobin   Date/Time Value Ref Range Status   04/13/2023 08:07 AM 14.0 11.5 - 15.4 g/dL Final     Hematocrit   Date/Time Value Ref Range Status   04/13/2023 08:07 AM 43.1 34.8 - 46.1 % Final     MCV   Date/Time Value Ref Range Status   04/13/2023 08:07 AM 91 82 - 98 fL Final     Platelets   Date/Time Value Ref Range Status   04/13/2023 08:07  149 - 390 Thousands/uL Final     Sodium   Date/Time Value Ref Range Status   04/13/2023 08:07  135 - 147 mmol/L " Final     Potassium   Date/Time Value Ref Range Status   04/13/2023 08:07 AM 3.8 3.5 - 5.3 mmol/L Final     Chloride   Date/Time Value Ref Range Status   04/13/2023 08:07  96 - 108 mmol/L Final     CO2   Date/Time Value Ref Range Status   04/13/2023 08:07 AM 29 21 - 32 mmol/L Final     ANION GAP   Date/Time Value Ref Range Status   04/13/2023 08:07 AM 5 4 - 13 mmol/L Final     BUN   Date/Time Value Ref Range Status   04/13/2023 08:07 AM 23 5 - 25 mg/dL Final     Creatinine   Date/Time Value Ref Range Status   04/13/2023 08:07 AM 0.78 0.60 - 1.30 mg/dL Final     Comment:     Standardized to IDMS reference method     Calcium   Date/Time Value Ref Range Status   04/13/2023 08:07 AM 9.4 8.4 - 10.2 mg/dL Final     AST   Date/Time Value Ref Range Status   04/13/2023 08:07 AM 17 13 - 39 U/L Final     ALT   Date/Time Value Ref Range Status   04/13/2023 08:07 AM 21 7 - 52 U/L Final     Comment:     Specimen collection should occur prior to Sulfasalazine administration due to the potential for falsely depressed results.      Alkaline Phosphatase   Date/Time Value Ref Range Status   04/13/2023 08:07 AM 60 34 - 104 U/L Final     Total Protein   Date/Time Value Ref Range Status   04/13/2023 08:07 AM 6.9 6.4 - 8.4 g/dL Final     Albumin   Date/Time Value Ref Range Status   04/13/2023 08:07 AM 4.1 3.5 - 5.0 g/dL Final     Total Bilirubin   Date/Time Value Ref Range Status   04/13/2023 08:07 AM 0.39 0.20 - 1.00 mg/dL Final     Comment:     Use of this assay is not recommended for patients undergoing treatment with eltrombopag due to the potential for falsely elevated results.  N-acetyl-p-benzoquinone imine (metabolite of Acetaminophen) will generate erroneously low results in samples for patients that have taken an overdose of Acetaminophen.     eGFR   Date/Time Value Ref Range Status   04/13/2023 08:07 AM 96 ml/min/1.73sq m Final       Cholesterol   Date/Time Value Ref Range Status   04/13/2023 08:07  See Comment mg/dL  Final     Comment:     Cholesterol:         Pediatric <18 Years        Desirable          <170 mg/dL      Borderline High    170-199 mg/dL      High               >=200 mg/dL        Adult >=18 Years            Desirable        <200 mg/dL      Borderline High  200-239 mg/dL      High             >239 mg/dL       LDL Calculated   Date/Time Value Ref Range Status   04/13/2023 08:07  (H) 0 - 100 mg/dL Final     Comment:     LDL Cholesterol:     Optimal           <100 mg/dl     Near Optimal      100-129 mg/dl     Above Optimal       Borderline High 130-159 mg/dl       High            160-189 mg/dl       Very High       >189 mg/dl         This screening LDL is a calculated result.   It does not have the accuracy of the Direct Measured LDL in the monitoring of patients with hyperlipidemia and/or statin therapy.   Direct Measure LDL (WEN249) must be ordered separately in these patients.     HDL, Direct   Date/Time Value Ref Range Status   04/13/2023 08:07 AM 39 (L) >=50 mg/dL Final     Triglycerides   Date/Time Value Ref Range Status   04/13/2023 08:07 AM 81 See Comment mg/dL Final     Comment:     Triglyceride:     0-9Y            <75mg/dL     10Y-17Y         <90 mg/dL       >=18Y     Normal          <150 mg/dL     Borderline High 150-199 mg/dL     High            200-499 mg/dL        Very High       >499 mg/dL    Specimen collection should occur prior to N-Acetylcysteine or Metamizole administration due to the potential for falsely depressed results.       Patient Active Problem List   Diagnosis    Possible exposure to STD    Close exposure to COVID-19 virus    COVID-19 virus infection    Encounter for screening mammogram for malignant neoplasm of breast    PCOS (polycystic ovarian syndrome)    Annual physical exam    Class 2 obesity due to excess calories without serious comorbidity with body mass index (BMI) of 39.0 to 39.9 in adult    Hypopigmentation    Rash    Chronic bilateral low back pain without sciatica     "Right knee pain    Left ankle injury       Review of systems: No new or worsening:   Unexplained fever/chills/sweats/weight loss  Respiratory issues such as new shortness of breath, cough  Heart issues such as new chest pain or pressure, palpitations  Abdominal or digestive issues such as diarrhea, constipation or blood in stool.  BM's are normal  Genitourinary issues  Neurological issues such as new numbness or motor weakness  Psychological issues such as depression or anxiety  Skin issues such as new rashes      Exam:  Alert, no acute distress /85 (BP Location: Left arm, Patient Position: Sitting, Cuff Size: Large)   Pulse 74   Temp 98.3 °F (36.8 °C) (Tympanic)   Resp 16   Ht 5' 6\" (1.676 m)   Wt 119 kg (262 lb)   SpO2 98%   BMI 42.29 kg/m²   HEENT: Head normocephalic and atraumatic  Lungs: No respiratory labor. Clear to auscultation  Cardiac: Regular rate and rhythm. No murmur, rub or gallop  Abdomen: Benign. Normal active bowel sounds.  Soft and non-tender. No organomegaly  Extremities: No cyanosis, clubbing or edema  Neuro screen: No gross deficits    BMI Counseling: Body mass index is 42.29 kg/m². The BMI is above normal. Nutrition recommendations include encouraging healthy choices of fruits and vegetables, decreasing fast food intake and limiting drinks that contain sugar. Exercise recommendations include moderate physical activity 150 minutes/week. Rationale for BMI follow-up plan is due to patient being overweight or obese.     Depression Screening and Follow-up Plan: Patient was screened for depression during today's encounter. They screened negative with a PHQ-2 score of 2.        Risks and benefits of therapeutic plan discussed, answered all patient questions and concerns and patient expressed understanding and agreement of therapeutic plan.        "

## 2025-01-15 NOTE — PATIENT INSTRUCTIONS
Flonase spray 2 sprays each nostril daily  Humidifier  Outside walks  Healthy diet    Dealing with Weight Control Concerns  Principles and Resources    Introduction:     The problem: 60% of Americans are now overweight or obese. Children are especially hit hard by this epidemic, and are acquiring 'adult' obesity-related diseases such as Type 2 diabetes mellitus at an early age unheard of even 30 years ago. Obesity is now by some calculations the number one preventable cause of disease and death in this country.  Many of those suffering from obesity spend their lives going on or off various diets in a desperate attempt to finally keep off the pounds. Such attempts are ultimately futile, since 'diets' (defined as any unusual way of eating that is non-sustainable and will eventually be abandoned) have been shown scientifically to not work. If 'diets' don't work, what does?  Why is losing weight so difficult?    Weight control on one level is simple - just take in (and absorb) fewer calories than you expend and the laws of thermodynamics guarantee you will lose weight.  However, simple does not necessarily mean easy. Many aspects of our society predispose to excess weight gain. These include:  Ubiquitous advertising and easy accessibility to high calorie but low nutrient-dense foods.   Distortion in portion sizes is a major problem, with excess serving sizes that have often doubled in the last 20 years.   More Americans are eating out - restaurant food is generally designed for entertainment, not health. Portions (and calories) are often excessive.  Low fiber foods and completing a meal faster than 20 minutes prevent proper feedback from the stomach and brain of when to stop eating.   Fast absorbed (high glycemic index) carbohydrates, such as commercial flour products, which induce overeating.    A sedentary lifestyle also impairs the body's ability to balance calories.   Excess stress and lack of life balance  predispose all of us to 'emotional eating' of 'comfort foods' that pack in the calories.   Cultural attitudes towards food, especially early childhood experiences can deeply embed behavior that is resistant to future change.   A family tendency towards obesity (genetic and congenital factors) is often expressed in an environment of excess.   Only uncommonly does an undiagnosed medical problem such as hypothyroidism turn out to be the cause of the weight gain. Certain medications however can predispose to weight gain - ask your doctor.   The totality of all these factors makes it very difficult for most people to lose and then maintain proper weight. Excess weight is a symptom of unhealthy eating/ lack of fitness. Focus on changing the underlying cause, not on the weight itself. When fitness is restored and sustainable healthy eating patterns are established, the weight will come down naturally.    The solution is simple (but not necessarily easy): adopt a whole foods style of eating high nutrient/calorie ratio food and exercise at least one hour daily or almost daily, and do this for the rest of your life.       Details on this healthy lifestyle are listed below. Only scientifically validated concepts are included in the specific food recommendations listed in this section:  ? Eat mainly whole foods that are rich in low calorie, nutrient dense foods such as vegetables and whole fruit. A whole food is any food as it is found in nature with no or minimal processing, or traditional foods like bread whose ingredients are minimally processed. Examples:   Whole food Partly Processed food (eat sparingly) Heavily processed food (avoid)   Apple, other whole fruit Apple juice Apple Pop tarts, etc.   Whole grains such as Basmati brown rice, barley, quinoa, steel cut oatmeal  Whole grain pasta, 100% stone-ground or sprouted grain whole wheat (or other grain) bread.  (minimize whole wheat bread that is finely milled & feels  'squishy' when compress it); white rice White bread, bagels, pastries, pizza, many commercial breakfast cereals, pasta made from white flour   Whole vegetables like broccoli, spinach, yams, etc. Veggie smoothies Veggie chips or sticks, Nutraceutical or food bars   Lentil (Blair), whole soybean & other bean dishes Foods made with processed soybean, e.g. 'textured vegetable protein' Bean chips     ? At least three quarters of your plate should be plant food, (<= 1/4 animal products), and should include at least 3-6 servings of vegetables (1 serving = 1 cup raw or ½ cup cooked) and 3-4 whole fruit per day:    [id number] (ARWEIGHTLOSSDIET)    ? Eat as many raw vegetables (such as in a salad) as you can every day. Eat these first in all meals. Use only a small amount of low fat dressing.  ? Eat liberal amounts of cooked vegetables, including at least 1 serving of cooked greens daily, e.g. kale, collards, spinach, broccoli.  ? Eat at least 2 whole fruit (not fruit juice or drink) per day  ? Practice cooking vegetarian main dishes, so you reduce reliance on animal products. See below 'Vegetarian cooking for everyone' and similar cookbooks.  ? Always spend at least 20 minutes eating all meals. This allows time for the stomach to feel full.  ? Don't drink your calories - the body can't balance it's energy if you do. Don't drink regular or diet soda, commercial ice tea or similar; large (> 4 oz.) of juice or milk per day  ? Include moderate amounts of low glycemic index, high fiber carbohydrate sources such as beans, squash and whole grain cereals (as opposed to finely milled flour products, potatoes and white rice). (See separate handouts). Keep average glycemic index < 60.  Avoid all white flour products whenever you can.  ? Avoid excess and unhealthy Trans and saturated fats: Limit frying. Trim visible fat from meats and choose only the leanest varieties (e.g. those that end with '-loin'). Avoid 'industrially produced'  meats, such as 'corn-fed steers'. Cows are designed to graze, not  a crowded feedlot and eat corn, which makes them (and you!) fat and sick. Choose white meat of poultry without the skin. Fresh fish is a healthy choice. Use cheese only as a condiment, not as a central part of a meal. Use butter and margarine sparingly. Limit heavy sauces and gravies. Limit even low fat beef and poultry to one or two servings a week total. Fish can be eaten up to 2 - 4 servings per week.  ? When eating out, choose the 'Heart healthy' or 'low fat' offerings. Avoid cheap fast food restaurants and 'all you can eat' buffets. Even salad bars can lead to overdoing the calories if you pile up your plate with non-vegetable offerings and dressings. If the portion is excessive, have the excess packaged for a second home meal or give some to a  before you start eating.  (See separate handout on tips for eating out healthily)  Re-train yourself to eat proper serving sizes of all foods (besides vegetables & whole fruit, which should be eaten in abundance).  This is necessary because of the ubiquitous 'portion distortion' that has normalized excess portion sizes in our society.  Obtain a portion-control diet plate set by going to www.thedietplate.com or calling 1 487.901.8574. Use it.  Take the 'Portion distortion' quizzes: http://it3822.nhlbihin.net/portion/  Calorie awareness - learn to make low calorie but high nutrient choices. Choose foods that have more grams than calories (i.e.. G/Kcal > 1) per serving size.  The best references to learn this are the following books: 'Picture Perfect Weight Loss' and accompanying cookbook (see below). Teaches scientifically sound Food Awareness program using mainly pictures.  Adequate fiber/ bulk. This happens almost automatically if you eat whole foods.  'Volumetrics' is an excellent reference to support this concept (see below).  Pacing of Weight loss: 0.5 to 2 lbs/week. One needs to have  a daily calorie deficit of 500 kcal to lose 1 lb. in a week. Trying to lose >2 lbs. /week is a recipe for failure and increases chance of inadequate nutrient intake. An exception is very low calorie diets done under professional guidance in a structured program.  Avoid 'dieting'. There are new diets every week, with periodic fads such as Grapefruit diet, Vinegar diet, etc. Healthy nutrition is all about balance, variety & moderation. Especially avoid any diet that labels any one nutrient class (such as carbohydrates) as either 'bad' or 'exclusively good'. No unsustainable diet works, and they should all be ignored.  Decide on a reasonable quota of sweets and treats servings to eat per week (such as 3 to 5) and stick with it. (A serving is 1 slice of pie or cake, or 2 medium cookies). Make a little ritual out of eating these, doing nothing else at the time so you can savor your treat slowly and mindfully without any guilt.  Consider taking a multivitamin/multimineral daily supplement to ensure adequate nutrients while losing weight (scientific benefit not proven).  Exercise for at least 60 minutes daily or almost daily. Regular exercise has been demonstrated to be crucial for maintaining weight control. (see exercise handouts)  This should include at least moderate level aerobics (at least at the level of brisk walking - not just strolling).  Should include strength training for at least 20 minutes at least 2 - 3 alternate days/week.  While education on proper eating & exercise is important, this is often the easiest aspect of lifestyle change. To accomplish all the above, you need also to proactively organize your life to allow adequate time to shop for, prepare and enjoy healthy food as well as to exercise.  (See separate handout on food shopping tips). This usually means that you will have to give up something else, which in turn means you need to adjust your value system of what is important to you.   Develop the  mental habit of planning how you are going to eat well and exercise at the beginning of every week, with a quick review at the beginning of every day.   Such a change in values and lifestyle often demands an entire repertoire of 'meta-skills', which may include the following:  Assertiveness - the ability to say 'no' in a healthy way to people and situations that are not in line with your new healthy lifestyle.  Self-awareness - the ability to monitor how balanced your life is every day and, just like a musical instrument, to 're-tune' your life on a regular basis as the need arises. Staying centered helps one stay on goal.  Positive attitude - the ability to tackle the inevitable challenges, obstacles and setbacks involved in any lifestyle change with constructive action (or if this is not possible, then graceful acceptance).  Self-acceptance - the ability to unconditionally accept all sides of yourself while at the same time not indulging or getting lost in unhealthy emotional states that lead to counter-productive behaviors. This includes forgiving any lapses, instead spending your emotional energy getting back on track.   Healthy skepticism - the ability to critically assess the barrage of questionable health claims aimed at consumers, to detect 'saboteur foods' that pretend to be healthy but aren't and to cut thru market hype.  Self-discipline: the ability to resist 'comfort food' and sedentary temptations of the moment and keep a long view of what you really want.  Given all the skills required and the societal predisposition to weight gain on multiple levels, we should not be surprised that weight loss, while simple, is also difficult. So if you fall off your healthy lifestyle, don't get down on yourself. Give yourself a break and just get back on track as soon as you realize the need to do so. Do something positive for yourself everyday, even if it is not perfect.  Reward yourself in a healthy manner for your  efforts. Enlist the moral support and coaching of trusted friends. Systems like Weight Watchers may help.  Dedicate at least ½ hr. but better 1 hr. per day of guarded time each day to lifestyle change.     The secret is that there is no secret. There are a large number of skills you will have to learn to accomplish your goals of improved fitness and healthy eating. When you accomplish these, the weight will normalize by itself.  Accomplishing your goals requires discipline, hard work and sacrifice - don't let anyone tell you otherwise! Yet it can be immensely satisfying and fun.  Break it down into manageable short-term goals. Small but consistent steps are more important than brief intense efforts to change. Have a professional monitor your progress. Utilize the skills of qualified specialists such as Personal trainers, Wellness coaches and Nutritionists.  Don't try to go it alone, for the same reason you would not try to become a pianist or a doctor on your own. A support network of friends with a healthy lifestyle and professionals is often crucial to success.    Recommended Readings:   Eat to Live by Jem Castro MD. [employer] 2003. Focuses on Nutrient/Calorie ratio as a key to successful weight loss.  The Volumetrics Weight-Control Plan by Gema Harper, Stevan Hernandez. [employer] 2000. Very good book to understand how to use low calorie dense foods to lose weight and control hunger.   Picture-Perfect Weight loss (& cookbook) by Dr. Marcelo Carreon. [employer]. 2000. Teaches calorie awareness thru pictures!  The Omnivore's dilemma and In Defense of Food by Henrik Miguel Books. These books explain where our food comes from and the strengths, weaknesses and toxicities of American food culture.   Glycemic index cooking made easy by Kay Romano, Shanice Maguire & Brenda Vanessa. [employer] 2007. Helps one put the Glycemic index concept in action,  with lots of delicious recipes.  Vegetarian Cooking for Everyone by Nadine Cason. Publisher: [employer], 1997.  An excellent guide to cooking vegetables in interesting and tasty ways that can be used by vegetarians and non-vegetarians alike.  Mindless Eating by Leonardo Mckeon, PhD. [employer], 2006. Explains how we all eat for reasons other than to satisfy hunger and what to do about it.  Fast food, Good food by Andrew Weil, Md.  [employer], 2015. Quick and easy recipes for busy people  [place] over Knives Cookbook by [facility] [place] [employer] 2012. A Vegan approach to healthy eating      Long term goals:   _____Switch to healthier lunch on the road _____________________________________________________  __________________________________________________________  __________________________________________________________    Short term goals:  ___________________________________________________________  ___________________________________________________________  ___________________________________________________________      © 3/ 2008  Koffi Jorgensen MD  Board certified, Family, Sports and Integrative medicine.  Revised 8/2019

## 2025-02-03 ENCOUNTER — HOSPITAL ENCOUNTER (EMERGENCY)
Facility: HOSPITAL | Age: 40
Discharge: HOME | End: 2025-02-03
Attending: EMERGENCY MEDICINE

## 2025-02-03 VITALS
SYSTOLIC BLOOD PRESSURE: 120 MMHG | DIASTOLIC BLOOD PRESSURE: 87 MMHG | HEART RATE: 95 BPM | TEMPERATURE: 98.1 F | RESPIRATION RATE: 20 BRPM | OXYGEN SATURATION: 100 %

## 2025-02-03 DIAGNOSIS — H93.8X2 EAR FULLNESS, LEFT: ICD-10-CM

## 2025-02-03 DIAGNOSIS — L29.9 ITCHING OF EAR: Primary | ICD-10-CM

## 2025-02-03 PROCEDURE — 99283 EMERGENCY DEPT VISIT LOW MDM: CPT

## 2025-02-03 PROCEDURE — 99284 EMERGENCY DEPT VISIT MOD MDM: CPT

## 2025-02-03 RX ORDER — CLOTRIMAZOLE 1 G/ML
1 SOLUTION TOPICAL 2 TIMES DAILY
Qty: 15 ML | Refills: 0 | Status: SHIPPED | OUTPATIENT
Start: 2025-02-03 | End: 2025-02-17

## 2025-02-04 NOTE — DISCHARGE INSTRUCTIONS
Today I provided prescription for clotrimazole ointment.  Use as directed for the next 14 days.  Follow-up with ear, nose, throat doctor for further evaluation.  Avoid using Q-tips in the left ear.  Return to ED for new or worsening symptoms.

## 2025-02-04 NOTE — ED PROVIDER NOTES
"Time reflects when diagnosis was documented in both MDM as applicable and the Disposition within this note       Time User Action Codes Description Comment    2/3/2025 10:33 PM GraffJamesGil Add [L29.9] Itching of ear     2/3/2025 10:33 PM Gil Graff Add [H93.8X2] Ear fullness, left           ED Disposition       ED Disposition   Discharge    Condition   Stable    Date/Time   Mon Feb 3, 2025 10:33 PM    Comment   Grisel Mackre discharge to home/self care.                   Assessment & Plan       Medical Decision Making  38 yo female presents to ED for evaluation of left ear fullness, itchiness, discomfort as seen in HPI.  On physical examination patient vital signs stable.  Otoscopic examination of left ear without erythema, bulging of tympanic membrane.  TM intact.  No significant cerumen build up. No significant drainage, discharge. No tragus tenderness. Exam does not suggest acute bacterial infection of ear.  Possibility of fungal infection of ear canal.  Plan to provide percent clotrimazole solution to use twice daily for the next 2 weeks.  Advised to follow-up with ENT if symptoms do not improve despite clotrimazole solution.  Advised to keep ears clean, dry. Avoid using Q tips. Return to ED for new or worsening symptoms. Patient agreeable to plan. Patient discharged.     Prior to discharge, discharge instructions were discussed with patient at bedside. Patient was provided both verbal and written instructions. Patient is understanding of the discharge instructions and is agreeable to plan of care. Return precautions were discussed with patient bedside, patient verbalized understanding of signs and symptoms that would necessitate return to the ED. All questions were answered. Patient was comfortable with the plan of care and discharged to home.    Portions of this chart may have been written with voice recognition software.  Occasional grammatical errors, wrong word or \"sound a like\" substitutions may " "have occurred due to software limitations.  Please read carefully and use context to recognize where substitutions have occurred.              Medications - No data to display    ED Risk Strat Scores                                              History of Present Illness       Chief Complaint   Patient presents with    Earache     Reports \"fungal ear infection left ear.\"  Reports worsening pain, muffled sounds, fullness, itchiness last 2 months.        Past Medical History:   Diagnosis Date    Chlamydia     Maternal morbid obesity in third trimester, antepartum (HCC) 2/9/2021    Obesity in pregnancy, antepartum, third trimester 12/29/2020    Varicella       Past Surgical History:   Procedure Laterality Date    VAGINAL DELIVERY      WISDOM TOOTH EXTRACTION        Family History   Problem Relation Age of Onset    Hypertension Mother     Hypertension Father     Hyperlipidemia Father     No Known Problems Sister     No Known Problems Son     No Known Problems Son     No Known Problems Son     Heart failure Maternal Grandfather     Pneumonia Paternal Grandmother     Heart failure Paternal Grandfather     Breast cancer Paternal Aunt         Per pt dx in her 50's      Social History     Tobacco Use    Smoking status: Former     Current packs/day: 0.00     Average packs/day: 0.5 packs/day for 10.0 years (5.0 ttl pk-yrs)     Types: Cigarettes     Start date: 1/1/2005     Quit date: 1/1/2015     Years since quitting: 10.1    Smokeless tobacco: Never   Vaping Use    Vaping status: Never Used   Substance Use Topics    Alcohol use: Not Currently    Drug use: Never      E-Cigarette/Vaping    E-Cigarette Use Never User       E-Cigarette/Vaping Substances    Nicotine No     THC No     CBD No     Flavoring No     Other No     Unknown No       I have reviewed and agree with the history as documented.     39-year-old female presents to ED for evaluation of left ear fullness, itchiness.  Symptoms have been present for the past 2 " months.  Patient read online that this could be related to a fungal infection of ear. Symptoms started after she was treated with ABX for strep throat in November. Denies use of Q-tips.  Denies recent swimming pool exposure.  Denies any drainage from the ear.  Denies fever, chills, shortness of breath, chest pain, seizure, syncope, cough, nasal congestion, sinus pressure.         Review of Systems   HENT:  Positive for ear pain.    All other systems reviewed and are negative.          Objective       ED Triage Vitals [02/03/25 2140]   Temperature Pulse Blood Pressure Respirations SpO2 Patient Position - Orthostatic VS   98.1 °F (36.7 °C) 95 120/87 20 100 % Sitting      Temp Source Heart Rate Source BP Location FiO2 (%) Pain Score    Oral Monitor Right arm -- --      Vitals      Date and Time Temp Pulse SpO2 Resp BP Pain Score FACES Pain Rating User   02/03/25 2140 98.1 °F (36.7 °C) 95 100 % 20 120/87 -- -- JH            Physical Exam  Vitals and nursing note reviewed.   Constitutional:       General: She is not in acute distress.     Appearance: Normal appearance. She is well-developed. She is not ill-appearing, toxic-appearing or diaphoretic.   HENT:      Head: Normocephalic and atraumatic.      Right Ear: No drainage or tenderness. There is no impacted cerumen. No foreign body. No mastoid tenderness. Tympanic membrane is not injected, erythematous or bulging.      Left Ear: No drainage or tenderness. There is no impacted cerumen. No foreign body. No mastoid tenderness. Tympanic membrane is not injected, erythematous or bulging.   Eyes:      Conjunctiva/sclera: Conjunctivae normal.   Cardiovascular:      Rate and Rhythm: Normal rate and regular rhythm.      Heart sounds: No murmur heard.  Pulmonary:      Effort: Pulmonary effort is normal. No respiratory distress.      Breath sounds: Normal breath sounds.   Musculoskeletal:      Cervical back: Neck supple.   Skin:     General: Skin is warm and dry.      Capillary  Refill: Capillary refill takes less than 2 seconds.   Neurological:      Mental Status: She is alert.   Psychiatric:         Mood and Affect: Mood normal.         Results Reviewed       None            No orders to display       Procedures    ED Medication and Procedure Management   Prior to Admission Medications   Prescriptions Last Dose Informant Patient Reported? Taking?   Prenatal Vit-Fe Fumarate-FA (M- Plus) 27-1 MG TABS  Self No No   Sig: Take 1 tablet by mouth in the morning   Patient not taking: Reported on 2024   Triamcinolone Acetonide (Nasacort Allergy 24HR) 55 MCG/ACT nasal spray   No No   Si spray by Each Nare route daily   Patient not taking: Reported on 1/15/2025   meloxicam (Mobic) 15 mg tablet  Self No No   Sig: Take 1 tablet (15 mg total) by mouth daily Please take one tablet daily in the morning with food. Please do not take any other anti-inflammatory medications such as Aleve (Naproxen), Motrin (Ibuprofen), or Advil (Ibuprofen) while taking meloxicam.   Patient not taking: Reported on 3/1/2024      Facility-Administered Medications: None     Discharge Medication List as of 2/3/2025 10:36 PM        START taking these medications    Details   clotrimazole 1 % external solution Apply 1 Application topically 2 (two) times a day for 14 days, Starting Mon 2/3/2025, Until 2025, Normal           CONTINUE these medications which have NOT CHANGED    Details   meloxicam (Mobic) 15 mg tablet Take 1 tablet (15 mg total) by mouth daily Please take one tablet daily in the morning with food. Please do not take any other anti-inflammatory medications such as Aleve (Naproxen), Motrin (Ibuprofen), or Advil (Ibuprofen) while taking meloxicam., Start ing 2024, Normal      Prenatal Vit-Fe Fumarate-FA (M-Henna Plus) 27-1 MG TABS Take 1 tablet by mouth in the morning, Starting Tue 10/10/2023, Normal      Triamcinolone Acetonide (Nasacort Allergy 24HR) 55 MCG/ACT nasal spray 1 spray by  Each Nare route daily, Starting Sat 11/23/2024, Normal             ED SEPSIS DOCUMENTATION   Time reflects when diagnosis was documented in both MDM as applicable and the Disposition within this note       Time User Action Codes Description Comment    2/3/2025 10:33 PM Gil Graff Add [L29.9] Itching of ear     2/3/2025 10:33 PM Gil Graff Add [H93.8X2] Ear fullness, left                  Gil Graff PA-C  02/04/25 0440

## 2025-02-05 ENCOUNTER — TELEPHONE (OUTPATIENT)
Age: 40
End: 2025-02-05

## 2025-03-04 ENCOUNTER — TELEPHONE (OUTPATIENT)
Age: 40
End: 2025-03-04

## 2025-03-04 ENCOUNTER — HOSPITAL ENCOUNTER (EMERGENCY)
Facility: HOSPITAL | Age: 40
Discharge: HOME | End: 2025-03-04
Attending: EMERGENCY MEDICINE

## 2025-03-04 VITALS
DIASTOLIC BLOOD PRESSURE: 87 MMHG | TEMPERATURE: 97.5 F | RESPIRATION RATE: 18 BRPM | SYSTOLIC BLOOD PRESSURE: 141 MMHG | OXYGEN SATURATION: 96 % | HEART RATE: 71 BPM

## 2025-03-04 DIAGNOSIS — H92.02 OTALGIA OF LEFT EAR: Primary | ICD-10-CM

## 2025-03-04 PROCEDURE — 99283 EMERGENCY DEPT VISIT LOW MDM: CPT

## 2025-03-04 PROCEDURE — 99283 EMERGENCY DEPT VISIT LOW MDM: CPT | Performed by: EMERGENCY MEDICINE

## 2025-03-04 NOTE — ED ATTENDING ATTESTATION
3/4/2025  IRaúl DO, saw and evaluated the patient. I have discussed the patient with the resident/non-physician practitioner and agree with the resident's/non-physician practitioner's findings, Plan of Care, and MDM as documented in the resident's/non-physician practitioner's note, except where noted. All available labs and Radiology studies were reviewed.  I was present for key portions of any procedure(s) performed by the resident/non-physician practitioner and I was immediately available to provide assistance.       At this point I agree with the current assessment done in the Emergency Department.  I have conducted an independent evaluation of this patient a history and physical is as follows:    38 yo F coming in for eval of left ear pain, ongoing for months. No changes, states she had a strep throat infection in the fall and she's had ear pain, fullness, decreased hearing since then. She hasn't been able to schedule an ENT appointment until May, 2 months from now.    PE:  The patient is well appearing, non-toxic, in NAD. Head: normocephalic, atraumatic. HEENT: mucous membranes moist. Left ear: normal canal, no mastoid tenderness, TM normal - no bulging or erythema, there is a mild effusion behind the TM. Right ear: normal.  Cap refill < 2 sec, skin warm and dry. No rashes or lesions.    Left TM effusion present, no signs of acute otitis media however and symptoms ongoing for months, chronically. No acute changes.  Recommending decongestants and antihistamines, f/u ENT outpt.    ED Course         Critical Care Time  Procedures

## 2025-03-04 NOTE — Clinical Note
Grisel Jordan was seen and treated in our emergency department on 3/4/2025.    No restrictions            Diagnosis:     Grisel  may return to work on return date.    She may return on this date: 03/04/2025         If you have any questions or concerns, please don't hesitate to call.      Ciara Wheeler MD    ______________________________           _______________          _______________  Hospital Representative                              Date                                Time

## 2025-03-04 NOTE — TELEPHONE ENCOUNTER
Patient called in to schedule ENT consult for left ear otitis, please call her to schedule.  She has no insurance but has financial assistance.

## 2025-03-04 NOTE — DISCHARGE INSTRUCTIONS
As discussed in the emergency department, recommend that you begin a daily allergy medication such as Claritin or Allegra, and Sudafed to help dry up sinus passages and relieve inflammation.  This will hopefully help your ear drain.

## 2025-03-14 PROCEDURE — 87102 FUNGUS ISOLATION CULTURE: CPT | Performed by: PHYSICIAN ASSISTANT

## 2025-03-14 PROCEDURE — 87205 SMEAR GRAM STAIN: CPT | Performed by: PHYSICIAN ASSISTANT

## 2025-03-14 PROCEDURE — 87070 CULTURE OTHR SPECIMN AEROBIC: CPT | Performed by: PHYSICIAN ASSISTANT

## 2025-03-26 ENCOUNTER — HOSPITAL ENCOUNTER (EMERGENCY)
Facility: HOSPITAL | Age: 40
Discharge: HOME | End: 2025-03-26
Attending: EMERGENCY MEDICINE

## 2025-03-26 VITALS
RESPIRATION RATE: 16 BRPM | OXYGEN SATURATION: 97 % | SYSTOLIC BLOOD PRESSURE: 127 MMHG | HEART RATE: 80 BPM | DIASTOLIC BLOOD PRESSURE: 74 MMHG | TEMPERATURE: 98.3 F

## 2025-03-26 DIAGNOSIS — J02.9 PHARYNGITIS: Primary | ICD-10-CM

## 2025-03-26 LAB
FLUAV AG UPPER RESP QL IA.RAPID: NEGATIVE
FLUBV AG UPPER RESP QL IA.RAPID: NEGATIVE
S PYO DNA THROAT QL NAA+PROBE: NOT DETECTED
SARS-COV+SARS-COV-2 AG RESP QL IA.RAPID: NEGATIVE

## 2025-03-26 PROCEDURE — 99283 EMERGENCY DEPT VISIT LOW MDM: CPT

## 2025-03-26 PROCEDURE — 99284 EMERGENCY DEPT VISIT MOD MDM: CPT | Performed by: EMERGENCY MEDICINE

## 2025-03-26 PROCEDURE — 87811 SARS-COV-2 COVID19 W/OPTIC: CPT | Performed by: EMERGENCY MEDICINE

## 2025-03-26 PROCEDURE — 87804 INFLUENZA ASSAY W/OPTIC: CPT | Performed by: EMERGENCY MEDICINE

## 2025-03-26 PROCEDURE — 87651 STREP A DNA AMP PROBE: CPT | Performed by: EMERGENCY MEDICINE

## 2025-03-26 RX ORDER — IBUPROFEN 600 MG/1
600 TABLET, FILM COATED ORAL ONCE
Status: COMPLETED | OUTPATIENT
Start: 2025-03-26 | End: 2025-03-26

## 2025-03-26 RX ORDER — DEXAMETHASONE 4 MG/1
10 TABLET ORAL ONCE
Status: DISCONTINUED | OUTPATIENT
Start: 2025-03-26 | End: 2025-03-26

## 2025-03-26 RX ORDER — AMOXICILLIN 500 MG/1
500 CAPSULE ORAL 2 TIMES DAILY
Qty: 20 CAPSULE | Refills: 0 | Status: SHIPPED | OUTPATIENT
Start: 2025-03-26 | End: 2025-04-05

## 2025-03-26 RX ORDER — AMOXICILLIN 250 MG/1
500 CAPSULE ORAL ONCE
Status: COMPLETED | OUTPATIENT
Start: 2025-03-26 | End: 2025-03-26

## 2025-03-26 RX ORDER — ACETAMINOPHEN 325 MG/1
975 TABLET ORAL ONCE
Status: DISCONTINUED | OUTPATIENT
Start: 2025-03-26 | End: 2025-03-26

## 2025-03-26 RX ADMIN — IBUPROFEN 600 MG: 600 TABLET, FILM COATED ORAL at 11:00

## 2025-03-26 RX ADMIN — AMOXICILLIN 500 MG: 250 CAPSULE ORAL at 12:08

## 2025-03-26 NOTE — DISCHARGE INSTRUCTIONS
You may take ibuprofen up to 600 mg orally every 6 hours to help with discomfort/inflammation and acetaminophen as directed on over-the-counter packaging.    Strep test was negative though your son's was positive and your symptoms are consistent with strep throat.  Antibiotics will be prescribed.  Take these until gone.  Be sure to drink plenty of fluids to stay well-hydrated.  You may also use over-the-counter lozenges or sprays to help with pain.

## 2025-03-26 NOTE — ED PROVIDER NOTES
Time reflects when diagnosis was documented in both MDM as applicable and the Disposition within this note       Time User Action Codes Description Comment    3/26/2025 11:45 AM Lashonda Tabares [J02.9] Pharyngitis           ED Disposition       ED Disposition   Discharge    Condition   Stable    Date/Time   Wed Mar 26, 2025 11:44 AM    Comment   Grisel Jordan discharge to home/self care.                   Assessment & Plan       Medical Decision Making  Amount and/or Complexity of Data Reviewed  Labs: ordered.    Risk  OTC drugs.  Prescription drug management.        ED Course as of 03/26/25 2006   Wed Mar 26, 2025   1021 Differential diagnosis includes but is not limited to viral or bacterial pharyngitis, generalized viral illness such as influenza.  Clinically does not have retropharyngeal or peritonsillar abscess.   1137 Viral panel negative.   1142 Not feeling much improvement at this time.  Expressed hesitancy in using steroid given history of anxiety and no prior steroid use.  Preferred ibuprofen for anti-inflammatory properties.  Offered to combine with acetaminophen for further pain relief and/or combination of medications to help temporarily anesthetize throat.  Declines both.  Viral panel negative.  Strep just resulted negative.     Her son's strep test returned positive.  Given their close proximity to 1 another and her symptoms of pharyngitis have covered with amoxicillin as discussed.    Medications   ibuprofen (MOTRIN) tablet 600 mg (600 mg Oral Given 3/26/25 1100)   amoxicillin (AMOXIL) capsule 500 mg (500 mg Oral Given 3/26/25 1208)       ED Risk Strat Scores                                                History of Present Illness       Chief Complaint   Patient presents with    Fever     Fever started Monday & sore throat started yesterday       Past Medical History:   Diagnosis Date    Chlamydia     Maternal morbid obesity in third trimester, antepartum (HCC) 2/9/2021     Obesity in pregnancy, antepartum, third trimester 12/29/2020    Varicella       Past Surgical History:   Procedure Laterality Date    VAGINAL DELIVERY      WISDOM TOOTH EXTRACTION        Family History   Problem Relation Age of Onset    Hypertension Mother     Hypertension Father     Hyperlipidemia Father     No Known Problems Sister     No Known Problems Son     No Known Problems Son     No Known Problems Son     Heart failure Maternal Grandfather     Pneumonia Paternal Grandmother     Heart failure Paternal Grandfather     Breast cancer Paternal Aunt         Per pt dx in her 50's      Social History     Tobacco Use    Smoking status: Former     Current packs/day: 0.00     Average packs/day: 0.5 packs/day for 10.0 years (5.0 ttl pk-yrs)     Types: Cigarettes     Start date: 1/1/2005     Quit date: 1/1/2015     Years since quitting: 10.2    Smokeless tobacco: Never   Vaping Use    Vaping status: Never Used   Substance Use Topics    Alcohol use: Not Currently    Drug use: Never      E-Cigarette/Vaping    E-Cigarette Use Never User       E-Cigarette/Vaping Substances    Nicotine No     THC No     CBD No     Flavoring No     Other No     Unknown No       I have reviewed and agree with the history as documented.     Grisel is a 39-year-old female presents to the emergency department from home for evaluation with very sore throat and low-grade fevers over the past few days.  Throat has become progressively uncomfortable.  Is very sore to swallow.  She does not appreciate lateralization of this.  She did also appreciate a lump on the right posterior neck.    No nasal congestion or cough.  She does have pressure sensation in the left ear though this preceded the last several days and she was already evaluated by ENT.  No cough, abdominal pain or other acute symptoms.    2 of her children did have influenza last week.  Her 6-year-old son is currently being evaluated in the ED with high fevers, cough and vomiting.  She has  not had anything for discomfort today.        Review of Systems   HENT:  Positive for congestion.    All other systems reviewed and are negative.          Objective       ED Triage Vitals   Temperature Pulse Blood Pressure Respirations SpO2 Patient Position - Orthostatic VS   03/26/25 0945 03/26/25 0945 03/26/25 0945 03/26/25 0945 03/26/25 0945 03/26/25 0945   98.3 °F (36.8 °C) 80 127/74 16 97 % Sitting      Temp Source Heart Rate Source BP Location FiO2 (%) Pain Score    03/26/25 0945 03/26/25 0945 03/26/25 0945 -- 03/26/25 1100    Oral Monitor Right arm  10 - Worst Possible Pain      Vitals      Date and Time Temp Pulse SpO2 Resp BP Pain Score FACES Pain Rating User   03/26/25 1100 -- -- -- -- -- 10 - Worst Possible Pain -- JDT   03/26/25 0945 98.3 °F (36.8 °C) 80 97 % 16 127/74 -- -- KM            Physical Exam  Vitals and nursing note reviewed.   Constitutional:       Comments: Appears mildly malaised.  Speech is clear.   HENT:      Head: Normocephalic.      Right Ear: Tympanic membrane and ear canal normal.      Left Ear: Tympanic membrane and ear canal normal.      Nose: Nose normal.      Mouth/Throat:      Mouth: Mucous membranes are moist.      Pharynx: Posterior oropharyngeal erythema present. No oropharyngeal exudate.      Comments: Edema appreciated of uvula and soft palate.  Eyes:      General: No scleral icterus.     Extraocular Movements: Extraocular movements intact.      Conjunctiva/sclera: Conjunctivae normal.   Cardiovascular:      Rate and Rhythm: Normal rate and regular rhythm.      Heart sounds: Normal heart sounds.   Pulmonary:      Effort: Pulmonary effort is normal.      Breath sounds: Normal breath sounds.   Musculoskeletal:      Cervical back: Normal range of motion and neck supple.   Lymphadenopathy:      Cervical: Cervical adenopathy (Prominent anterior cervical nodes.  These are nontender.  She does have tender right posterior cervical nodes.) present.   Skin:     General: Skin is warm  and dry.      Findings: No rash.   Neurological:      Mental Status: She is alert and oriented to person, place, and time.   Psychiatric:         Mood and Affect: Mood normal.         Behavior: Behavior normal.       Results Reviewed       Procedure Component Value Units Date/Time    Strep A PCR [[id number]]  (Normal) Collected: 03/26/25 1047    Lab Status: Final result Specimen: Throat Updated: 03/26/25 1141     STREP A PCR Not Detected    FLU/COVID Rapid Antigen (30 min. TAT) - Preferred screening test in ED [[id number]]  (Normal) Collected: 03/26/25 1047    Lab Status: Final result Specimen: Nares from Nose Updated: 03/26/25 1129     SARS COV Rapid Antigen Negative     Influenza A Rapid Antigen Negative     Influenza B Rapid Antigen Negative    Narrative:      This test has been performed using the [employer]anbatoid[employer] Mel 2 FLU+SARS Antigen test under the Emergency Use Authorization (EUA). This test has been validated by the  and verified by the performing laboratory. The Mel uses lateral flow immunofluorescent sandwich assay to detect SARS-COV, Influenza A and Influenza B Antigen.     The Quidel Mel 2 SARS Antigen test does not differentiate between SARS-CoV and SARS-CoV-2.     Negative results are presumptive and may be confirmed with a molecular assay, if necessary, for patient management. Negative results do not rule out SARS-CoV-2 or influenza infection and should not be used as the sole basis for treatment or patient management decisions. A negative test result may occur if the level of antigen in a sample is below the limit of detection of this test.     Positive results are indicative of the presence of viral antigens, but do not rule out bacterial infection or co-infection with other viruses.     All test results should be used as an adjunct to clinical observations and other information available to the provider.    FOR PEDIATRIC PATIENTS - copy/paste COVID Guidelines URL to browser:  [email or web address]            No orders to display       Procedures    ED Medication and Procedure Management   Prior to Admission Medications   Prescriptions Last Dose Informant Patient Reported? Taking?   Prenatal Vit-Fe Fumarate-FA (M- Plus) 27-1 MG TABS  Self No No   Sig: Take 1 tablet by mouth in the morning   Patient not taking: Reported on 2024   Triamcinolone Acetonide (Nasacort Allergy 24HR) 55 MCG/ACT nasal spray   No No   Si spray by Each Nare route daily   Patient not taking: Reported on 3/14/2025   clotrimazole 1 % external solution   No No   Sig: Apply 1 Application topically 2 (two) times a day for 14 days   meloxicam (Mobic) 15 mg tablet  Self No No   Sig: Take 1 tablet (15 mg total) by mouth daily Please take one tablet daily in the morning with food. Please do not take any other anti-inflammatory medications such as Aleve (Naproxen), Motrin (Ibuprofen), or Advil (Ibuprofen) while taking meloxicam.   Patient not taking: Reported on 3/1/2024      Facility-Administered Medications: None     Discharge Medication List as of 3/26/2025 12:00 PM        START taking these medications    Details   amoxicillin (AMOXIL) 500 mg capsule Take 1 capsule (500 mg total) by mouth 2 (two) times a day for 10 days, Starting Wed 3/26/2025, Until Sat 2025, Normal           CONTINUE these medications which have NOT CHANGED    Details   clotrimazole 1 % external solution Apply 1 Application topically 2 (two) times a day for 14 days, Starting Mon 2/3/2025, Until 2025, Normal      meloxicam (Mobic) 15 mg tablet Take 1 tablet (15 mg total) by mouth daily Please take one tablet daily in the morning with food. Please do not take any other anti-inflammatory medications such as Aleve (Naproxen), Motrin (Ibuprofen), or Advil (Ibuprofen) while taking meloxicam., Start ing 2024, Normal      Prenatal Vit-Fe Fumarate-FA (M- Plus) 27-1 MG  TABS Take 1 tablet by mouth in the morning, Starting Tue 10/10/2023, Normal      Triamcinolone Acetonide (Nasacort Allergy 24HR) 55 MCG/ACT nasal spray 1 spray by Each Nare route daily, Starting Sat 11/23/2024, Normal           No discharge procedures on file.  ED SEPSIS DOCUMENTATION   Time reflects when diagnosis was documented in both MDM as applicable and the Disposition within this note       Time User Action Codes Description Comment    3/26/2025 11:45 AM Lashonda Tabares Add [J02.9] Pharyngitis                  Lashonda Tabares MD  03/26/25 2007

## 2025-03-28 ENCOUNTER — HOSPITAL ENCOUNTER (EMERGENCY)
Facility: HOSPITAL | Age: 40
Discharge: HOME | End: 2025-03-28
Attending: STUDENT IN AN ORGANIZED HEALTH CARE EDUCATION/TRAINING PROGRAM

## 2025-03-28 ENCOUNTER — APPOINTMENT (EMERGENCY)
Dept: RADIOLOGY | Facility: HOSPITAL | Age: 40
End: 2025-03-28

## 2025-03-28 VITALS
OXYGEN SATURATION: 96 % | TEMPERATURE: 98.1 F | DIASTOLIC BLOOD PRESSURE: 85 MMHG | HEART RATE: 83 BPM | RESPIRATION RATE: 18 BRPM | SYSTOLIC BLOOD PRESSURE: 121 MMHG

## 2025-03-28 DIAGNOSIS — J02.9 VIRAL PHARYNGITIS: Primary | ICD-10-CM

## 2025-03-28 LAB
EXT PREGNANCY TEST URINE: NEGATIVE
EXT. CONTROL: NORMAL

## 2025-03-28 PROCEDURE — 99284 EMERGENCY DEPT VISIT MOD MDM: CPT | Performed by: STUDENT IN AN ORGANIZED HEALTH CARE EDUCATION/TRAINING PROGRAM

## 2025-03-28 PROCEDURE — 81025 URINE PREGNANCY TEST: CPT | Performed by: STUDENT IN AN ORGANIZED HEALTH CARE EDUCATION/TRAINING PROGRAM

## 2025-03-28 PROCEDURE — 71045 X-RAY EXAM CHEST 1 VIEW: CPT

## 2025-03-28 PROCEDURE — 99283 EMERGENCY DEPT VISIT LOW MDM: CPT

## 2025-03-28 NOTE — Clinical Note
Grisel Jordan was seen and treated in our emergency department on 3/28/2025.                Diagnosis:     Grisel  .    She may return on this date:          If you have any questions or concerns, please don't hesitate to call.      Fermin Stiles, DO    ______________________________           _______________          _______________  Hospital Representative                              Date                                Time

## 2025-03-28 NOTE — DISCHARGE INSTRUCTIONS
You were seen in the Emergency Department for: Sore throat, fever, cough, and bodyaches    Your workup today showed: Stable vitals with clear chest x-ray and physical exam concerning for of viral illness    Your next steps should include: Please call today to make an appointment with your primary care provider in one week.    Reasons to RETURN IMMEDIATELY to the Emergency Department: worsening symptoms, difficulty breathing, temperature > 100.4 degrees, uncontrollable nausea/vomiting, or any other concerns.

## 2025-03-28 NOTE — ED PROVIDER NOTES
Time reflects when diagnosis was documented in both MDM as applicable and the Disposition within this note       Time User Action Codes Description Comment    3/28/2025  1:35 PM Fermin Stiles [J02.9] Viral pharyngitis           ED Disposition       ED Disposition   Discharge    Condition   Stable    Date/Time   Fri Mar 28, 2025  1:35 PM    Comment   Grisel Jordan discharge to home/self care.                   Assessment & Plan       Medical Decision Making  39-year-old female with noncontributory past medical history presenting for evaluation of sore throat.    Differential diagnoses include but not limited to: Viral upper respiratory illness, strep pharyngitis, pneumonia    Initial workup to include: Chest x-ray    See ED Course for data/imaging interpretation and further MDM.    Disposition: Chest x-ray with no acute cardiopulmonary abnormalities or focal consolidations concerning for pneumonia.  Physical exam without any evidence of worsening erythema, tonsillar exudates, oropharyngeal abscess present.  Patient has mild anterior cervical lymphadenopathy.  Suspect viral etiology as cause of symptoms.  Patient instructed to continue to treat fever symptomatically at home with alternating Tylenol and Motrin.  Return precautions discussed and patient discharged home in stable condition.      Amount and/or Complexity of Data Reviewed  Labs: ordered. Decision-making details documented in ED Course.  Radiology: ordered and independent interpretation performed. Decision-making details documented in ED Course.        ED Course as of 03/28/25 1603   Fri Mar 28, 2025   1258 XR chest 1 view portable  No acute cardiopulmonary abnormalities noted per my interpretation   1307 POCT pregnancy, urine  Negative pregnancy test       Medications - No data to display    ED Risk Strat Scores                            SBIRT 20yo+      Flowsheet Row Most Recent Value   Initial Alcohol Screen: US AUDIT-C     1. How often do you  have a drink containing alcohol? 0 Filed at: 03/28/2025 1105   2. How many drinks containing alcohol do you have on a typical day you are drinking?  0 Filed at: 03/28/2025 1105   3b. FEMALE Any Age, or MALE 65+: How often do you have 4 or more drinks on one occassion? 0 Filed at: 03/28/2025 1105   Audit-C Score 0 Filed at: 03/28/2025 1105   NUBIA: How many times in the past year have you...    Used an illegal drug or used a prescription medication for non-medical reasons? Never Filed at: 03/28/2025 1105                            History of Present Illness       Chief Complaint   Patient presents with    Sore Throat     Pt reports was here Wednesday for sore throat, given abx for strep, states feeling worse and worsening fevers, sore throat, mucus production; 400mg ibuprofen at 0600       Past Medical History:   Diagnosis Date    Chlamydia     Maternal morbid obesity in third trimester, antepartum (Formerly [facility]) 2/9/2021    Obesity in pregnancy, antepartum, third trimester 12/29/2020    Varicella       Past Surgical History:   Procedure Laterality Date    VAGINAL DELIVERY      WISDOM TOOTH EXTRACTION        Family History   Problem Relation Age of Onset    Hypertension Mother     Hypertension Father     Hyperlipidemia Father     No Known Problems Sister     No Known Problems Son     No Known Problems Son     No Known Problems Son     Heart failure Maternal Grandfather     Pneumonia Paternal Grandmother     Heart failure Paternal Grandfather     Breast cancer Paternal Aunt         Per pt dx in her 50's      Social History     Tobacco Use    Smoking status: Former     Current packs/day: 0.00     Average packs/day: 0.5 packs/day for 10.0 years (5.0 ttl pk-yrs)     Types: Cigarettes     Start date: 1/1/2005     Quit date: 1/1/2015     Years since quitting: 10.2    Smokeless tobacco: Never   Vaping Use    Vaping status: Never Used   Substance Use Topics    Alcohol use: Not Currently    Drug use: Never      E-Cigarette/Vaping     E-Cigarette Use Never User       E-Cigarette/Vaping Substances    Nicotine No     THC No     CBD No     Flavoring No     Other No     Unknown No       I have reviewed and agree with the history as documented.     39-year-old female with noncontributory past medical history presenting for evaluation of sore throat.  Patient evaluated in ED on 03/26/2025 for similar symptoms.  At that time rapid strep negative and COVID/flu negative.  Patient's son tested positive for strep a, patient was prescribed amoxicillin for coverage of strep throat.  She reports compliance with medication for past 2 days.  States that symptoms are continuing to worsen including sore throat and fever Tmax of 103F earlier today.  Patient endorses new cough with yellow-green mucus. At present denies any headaches, dizziness, chest pain, shortness of breath, abdominal pain, nausea, vomiting, diarrhea, dysuria, hematuria.              Sore Throat  Associated symptoms: chills and fever        Review of Systems   Constitutional:  Positive for chills and fever.   HENT:  Positive for sore throat.    All other systems reviewed and are negative.          Objective       ED Triage Vitals   Temperature Pulse Blood Pressure Respirations SpO2 Patient Position - Orthostatic VS   03/28/25 1104 03/28/25 1102 03/28/25 1102 03/28/25 1102 03/28/25 1102 03/28/25 1102   98.1 °F (36.7 °C) 83 121/85 18 96 % Sitting      Temp Source Heart Rate Source BP Location FiO2 (%) Pain Score    03/28/25 1104 03/28/25 1102 -- -- 03/28/25 1102    Oral Monitor   10 - Worst Possible Pain      Vitals      Date and Time Temp Pulse SpO2 Resp BP Pain Score FACES Pain Rating User   03/28/25 1104 98.1 °F (36.7 °C) -- -- -- -- -- -- AB   03/28/25 1102 -- 83 96 % 18 121/85 10 - Worst Possible Pain -- AB            Physical Exam  Constitutional:       Appearance: Normal appearance.   HENT:      Right Ear: Tympanic membrane and ear canal normal. No swelling or tenderness. Tympanic membrane is  not erythematous.      Left Ear: Tympanic membrane and ear canal normal. No swelling or tenderness. Tympanic membrane is not erythematous.      Mouth/Throat:      Pharynx: No pharyngeal swelling, oropharyngeal exudate or posterior oropharyngeal erythema.      Tonsils: No tonsillar exudate or tonsillar abscesses.   Eyes:      Extraocular Movements: Extraocular movements intact.      Conjunctiva/sclera: Conjunctivae normal.      Pupils: Pupils are equal, round, and reactive to light.   Cardiovascular:      Rate and Rhythm: Normal rate and regular rhythm.      Pulses: Normal pulses.      Heart sounds: Normal heart sounds. No murmur heard.     No friction rub. No gallop.   Pulmonary:      Effort: Pulmonary effort is normal. No respiratory distress.      Breath sounds: Normal breath sounds. No stridor. No wheezing, rhonchi or rales.   Abdominal:      General: Abdomen is flat. Bowel sounds are normal. There is no distension.      Palpations: Abdomen is soft.      Tenderness: There is no abdominal tenderness. There is no right CVA tenderness, left CVA tenderness, guarding or rebound.   Skin:     General: Skin is warm and dry.      Capillary Refill: Capillary refill takes less than 2 seconds.   Neurological:      General: No focal deficit present.      Mental Status: She is alert and oriented to person, place, and time. Mental status is at baseline.         Results Reviewed       Procedure Component Value Units Date/Time    POCT pregnancy, urine [[id number]]  (Normal) Collected: 03/28/25 1304    Lab Status: Final result Updated: 03/28/25 1305     EXT Preg Test, Ur Negative     Control Valid            XR chest 1 view portable   ED Interpretation by Fermin Stiles DO (03/28 1258)   No acute cardiopulmonary abnormalities noted per my interpretation      Final Interpretation by Simon Denny MD (03/28 1413)      No acute cardiopulmonary disease.            Resident: NITISH Dyer I, the attending radiologist, have reviewed  the images and agree with the final report above.      Workstation performed: [id number]             Procedures    ED Medication and Procedure Management   Prior to Admission Medications   Prescriptions Last Dose Informant Patient Reported? Taking?   Prenatal Vit-Fe Fumarate-FA (M- Plus) 27-1 MG TABS  Self No No   Sig: Take 1 tablet by mouth in the morning   Patient not taking: Reported on 2024   Triamcinolone Acetonide (Nasacort Allergy 24HR) 55 MCG/ACT nasal spray   No No   Si spray by Each Nare route daily   Patient not taking: Reported on 3/14/2025   amoxicillin (AMOXIL) 500 mg capsule   No No   Sig: Take 1 capsule (500 mg total) by mouth 2 (two) times a day for 10 days   clotrimazole 1 % external solution   No No   Sig: Apply 1 Application topically 2 (two) times a day for 14 days   meloxicam (Mobic) 15 mg tablet  Self No No   Sig: Take 1 tablet (15 mg total) by mouth daily Please take one tablet daily in the morning with food. Please do not take any other anti-inflammatory medications such as Aleve (Naproxen), Motrin (Ibuprofen), or Advil (Ibuprofen) while taking meloxicam.   Patient not taking: Reported on 3/1/2024      Facility-Administered Medications: None     Discharge Medication List as of 3/28/2025  1:36 PM        CONTINUE these medications which have NOT CHANGED    Details   amoxicillin (AMOXIL) 500 mg capsule Take 1 capsule (500 mg total) by mouth 2 (two) times a day for 10 days, Starting Wed 3/26/2025, Until Sat 2025, Normal      clotrimazole 1 % external solution Apply 1 Application topically 2 (two) times a day for 14 days, Starting Mon 2/3/2025, Until 2025, Normal      meloxicam (Mobic) 15 mg tablet Take 1 tablet (15 mg total) by mouth daily Please take one tablet daily in the morning with food. Please do not take any other anti-inflammatory medications such as Aleve (Naproxen), Motrin (Ibuprofen), or Advil (Ibuprofen) while taking meloxicam., Start ing 2024,  Normal      Prenatal Vit-Fe Fumarate-FA (M- Plus) 27-1 MG TABS Take 1 tablet by mouth in the morning, Starting Tue 10/10/2023, Normal      Triamcinolone Acetonide (Nasacort Allergy 24HR) 55 MCG/ACT nasal spray 1 spray by Each Nare route daily, Starting Sat 2024, Normal           No discharge procedures on file.  ED SEPSIS DOCUMENTATION   Time reflects when diagnosis was documented in both MDM as applicable and the Disposition within this note       Time User Action Codes Description Comment    3/28/2025  1:35 PM Fermin Stiles Add [J02.9] Viral pharyngitis                  Ferimn Stiles DO  25 1600

## 2025-03-29 NOTE — ED ATTENDING ATTESTATION
03/29/25    I, Queenie Moreno MD, saw and evaluated the patient. I have discussed the patient with the resident/non-physician practitioner and agree with the resident's/non-physician practitioner's findings, Plan of Care, and MDM as documented in the resident's/non-physician practitioner's note, except where noted. All available labs and Radiology studies were reviewed.  I was present for key portions of any procedure(s) performed by the resident/non-physician practitioner and I was immediately available to provide assistance.       At this point I agree with the current assessment done in the Emergency Department.  I have conducted an independent evaluation of this patient a history and physical is as follows:    Subjective: 38 yo F w/ no significant PMH presenting with sore throat, fever with Tmax 103, cough productive of yellow sputum, and nasal congestion x4 days. She denies associated symptoms and has been taking amoxicillin for possible strep throat.     Objective: VSS and afebrile. Lungs CTAB. No tonsillar erythema/swelling/exudate but mild pharyngeal erythema. Congestion present.    Assessment/Plan: 38 yo healthy F p/w fever, sore throat, productive cough, congestion. Ddx includes but is not limited to URI, pneumonia, viral pharyngitis, less likely strep throat. CXR clear. Patient offered analgesics but declined.     Patient tolerated p.o. and ambulatory challenges in the emergency department and had stable vitals at time of discharge.      Dispo: Patient was discharged to home with strict return precautions. Patient acknowledged understanding of plan and diagnostic results, and all their questions were answered to their satisfaction.